# Patient Record
Sex: MALE | Race: ASIAN | Employment: OTHER | URBAN - METROPOLITAN AREA
[De-identification: names, ages, dates, MRNs, and addresses within clinical notes are randomized per-mention and may not be internally consistent; named-entity substitution may affect disease eponyms.]

---

## 2017-04-07 PROBLEM — M85.859 OSTEOPENIA OF THIGH, UNSPECIFIED LATERALITY: Status: ACTIVE | Noted: 2017-04-07

## 2017-04-08 PROBLEM — Z85.850 HISTORY OF THYROID CANCER: Status: ACTIVE | Noted: 2017-04-08

## 2017-11-09 ENCOUNTER — OFFICE VISIT (OUTPATIENT)
Dept: INTERNAL MEDICINE CLINIC | Facility: CLINIC | Age: 77
End: 2017-11-09

## 2017-11-09 VITALS
TEMPERATURE: 98 F | OXYGEN SATURATION: 99 % | RESPIRATION RATE: 16 BRPM | DIASTOLIC BLOOD PRESSURE: 60 MMHG | WEIGHT: 155 LBS | SYSTOLIC BLOOD PRESSURE: 122 MMHG | BODY MASS INDEX: 24 KG/M2 | HEART RATE: 56 BPM

## 2017-11-09 DIAGNOSIS — M17.11 PRIMARY OSTEOARTHRITIS OF RIGHT KNEE: Primary | ICD-10-CM

## 2017-11-09 PROCEDURE — 99213 OFFICE O/P EST LOW 20 MIN: CPT | Performed by: INTERNAL MEDICINE

## 2017-11-09 RX ORDER — ENALAPRIL MALEATE 10 MG/1
10 TABLET ORAL DAILY
Qty: 90 TABLET | Refills: 3 | Status: SHIPPED | OUTPATIENT
Start: 2017-11-09 | End: 2019-02-25

## 2017-11-09 RX ORDER — NICOTINE POLACRILEX 4 MG/1
1 GUM, CHEWING ORAL DAILY
Qty: 90 TABLET | Refills: 3 | Status: SHIPPED | OUTPATIENT
Start: 2017-11-09 | End: 2020-09-01

## 2017-11-09 RX ORDER — HYDROCODONE BITARTRATE AND ACETAMINOPHEN 5; 325 MG/1; MG/1
1 TABLET ORAL EVERY 4 HOURS PRN
Qty: 30 TABLET | Refills: 0 | Status: SHIPPED | OUTPATIENT
Start: 2017-11-09 | End: 2018-02-22 | Stop reason: ALTCHOICE

## 2017-11-09 RX ORDER — CELECOXIB 200 MG/1
200 CAPSULE ORAL DAILY
Qty: 30 CAPSULE | Refills: 11 | Status: SHIPPED | OUTPATIENT
Start: 2017-11-09 | End: 2018-05-22

## 2017-11-09 RX ORDER — ZOSTER VACCINE LIVE 19400 [PFU]/.65ML
INJECTION, POWDER, LYOPHILIZED, FOR SUSPENSION SUBCUTANEOUS
COMMUNITY
Start: 2017-11-07 | End: 2018-05-22

## 2017-11-09 RX ORDER — INFLUENZA A VIRUSA/MICHIGAN/45/2015 X-275 (H1N1) ANTIGEN (FORMALDEHYDE INACTIVATED), INFLUENZA A VIRUS A/HONG KONG/4801/2014 X-263B (H3N2) ANTIGEN (FORMALDEHYDE INACTIVATED), AND INFLUENZA B VIRUS B/BRISBANE/60/2008 ANTIGEN (FORMALDEHYDE INACTIVATED) 60; 60; 60 UG/.5ML; UG/.5ML; UG/.5ML
INJECTION, SUSPENSION INTRAMUSCULAR
COMMUNITY
Start: 2017-11-03 | End: 2018-05-22

## 2017-11-09 NOTE — PROGRESS NOTES
Ankita Belle  1940 is a 69year old male.     Patient presents with:  Pain: Bilateral knee pain      HPI:    Knee:   Presents with c/o Pain right   Swelling no  Locking and catching no    Giving way sensation n0  Injury none  Patient did see the or • Lumbar radiculopathy 9/16/2011   • Osteoarthrosis, unspecified whether generalized or localized, unspecified site    • Prostate hypertrophy 2/8/2016   • Thalassemia minor    • Thyroid cancer ([facility]     Dr. Bella Jaffe at [facility]   • Unspecified essential hypertens

## 2017-11-17 ENCOUNTER — TELEPHONE (OUTPATIENT)
Dept: INTERNAL MEDICINE CLINIC | Facility: CLINIC | Age: 77
End: 2017-11-17

## 2018-01-25 ENCOUNTER — OFFICE VISIT (OUTPATIENT)
Dept: INTERNAL MEDICINE CLINIC | Facility: CLINIC | Age: 78
End: 2018-01-25

## 2018-01-25 VITALS
SYSTOLIC BLOOD PRESSURE: 120 MMHG | RESPIRATION RATE: 14 BRPM | HEIGHT: 68 IN | DIASTOLIC BLOOD PRESSURE: 70 MMHG | OXYGEN SATURATION: 99 % | WEIGHT: 155 LBS | BODY MASS INDEX: 23.49 KG/M2 | TEMPERATURE: 99 F | HEART RATE: 64 BPM

## 2018-01-25 DIAGNOSIS — M85.859 OSTEOPENIA OF THIGH, UNSPECIFIED LATERALITY: ICD-10-CM

## 2018-01-25 DIAGNOSIS — Z00.00 ROUTINE GENERAL MEDICAL EXAMINATION AT A HEALTH CARE FACILITY: Primary | ICD-10-CM

## 2018-01-25 DIAGNOSIS — E89.0 POSTOPERATIVE HYPOTHYROIDISM: Chronic | ICD-10-CM

## 2018-01-25 DIAGNOSIS — R91.8 PULMONARY NODULES: ICD-10-CM

## 2018-01-25 DIAGNOSIS — I10 ESSENTIAL HYPERTENSION, BENIGN: Chronic | ICD-10-CM

## 2018-01-25 PROBLEM — Z85.850 HISTORY OF THYROID CANCER: Chronic | Status: ACTIVE | Noted: 2017-04-08

## 2018-01-25 PROCEDURE — 90732 PPSV23 VACC 2 YRS+ SUBQ/IM: CPT | Performed by: INTERNAL MEDICINE

## 2018-01-25 PROCEDURE — G0009 ADMIN PNEUMOCOCCAL VACCINE: HCPCS | Performed by: INTERNAL MEDICINE

## 2018-01-25 PROCEDURE — 93000 ELECTROCARDIOGRAM COMPLETE: CPT | Performed by: INTERNAL MEDICINE

## 2018-01-25 PROCEDURE — G0439 PPPS, SUBSEQ VISIT: HCPCS | Performed by: INTERNAL MEDICINE

## 2018-01-25 PROCEDURE — 96160 PT-FOCUSED HLTH RISK ASSMT: CPT | Performed by: INTERNAL MEDICINE

## 2018-01-25 NOTE — PROGRESS NOTES
Danny Mendez is a 69year old male who presents for a Medicare Annual Wellness visit.    male     Patient Care Team: Patient Care Team:  Brooklynn Ewing MD as PCP - General (Internal Medicine)  Ricco Wallace MD as Consulting Physician (Internal Medicine) Encounters:  01/25/18 : 155 lb  11/09/17 : 155 lb  04/07/17 : 151 lb  01/31/17 : 151 lb  12/12/16 : 154 lb  10/25/16 : 156 lb    Body mass index is 23.57 kg/m².       Lab Results  Component Value Date   GLU 88 10/27/2015    (H) 08/10/2015   GLU 97 07 Showering: Able without help    Toileting: Able without help    Dressing: Able without help    Eating: Able without help    Driving: Able without help    Preparing your meals: Able without help    Managing money/bills: Able without help    Taking medicatio Screen every 10 years There are no preventive care reminders to display for this patient. Update Health Maintenance if applicable    Flex Sigmoidoscopy Screen every 5 years No results found for this or any previous visit. No flowsheet data found.      Fecal data found. No flowsheet data found. COPD      Spirometry Testing Annually No results found for this or any previous visit. No flowsheet data found.       ALLERGIES:   No Known Allergies    CURRENT MEDICATIONS:     Current Outpatient Prescriptions:  amandeep unspecified laterality 04/07/2017   • Prostate hypertrophy 2/8/2016   • Thalassemia minor    • Thyroid cancer [facility])     Dr. Adalberto Peraza at [facility]   • Unspecified essential hypertension    • Varicocele       Past Surgical History:  2009: CATARACT      Comment: Tono Ma Other      siblings       SOCIAL HISTORY:   Smoking status: Never Smoker                                                              Smokeless tobacco: Never Used                      Alcohol use:  No              Occ: retired : yes      REVIEW OF S urinary incontinence/no history of kidney disease or genital abnormalities. Dysuria none. Nocturia None. Musculoskeletal:   Patient denies arthritis , muscle weakness --HX of LBP occ. Joint pain occ knee pain right -sees Dr. Sera Aquino. Joint stiffness none. GENITOURINARY - MALE:   External genitals: unremarkable-small varicoele. ADONIS: normal .   Penis: unremarkable. Prostate: mildly enlarged. Testicles: unremarkable. EXTREMITIES:   Clubbing: none. Cyanosis: absent . Edema: none.    Pulses: present patient is asked to return in 6 months for f/u .   Diet counseling perfomed    SUGGESTED VACCINATIONS - Influenza, Pneumococcal, Zoster, Tetanus     Immunization History   Administered Date(s) Administered   • Influenza 12/09/2016   • Influenza Vaccine, Hig

## 2018-01-25 NOTE — PATIENT INSTRUCTIONS
Cecil Aguayo's SCREENING SCHEDULE   Tests on this list are recommended by your physician but may not be covered, or covered at this frequency, by your insurer. Please check with your insurance carrier before scheduling to verify coverage.

## 2018-02-01 ENCOUNTER — LAB ENCOUNTER (OUTPATIENT)
Dept: LAB | Age: 78
End: 2018-02-01
Attending: INTERNAL MEDICINE
Payer: MEDICARE

## 2018-02-01 DIAGNOSIS — Z00.00 ROUTINE GENERAL MEDICAL EXAMINATION AT A HEALTH CARE FACILITY: Primary | ICD-10-CM

## 2018-02-01 LAB
ALBUMIN SERPL-MCNC: 3.9 G/DL (ref 3.5–4.8)
ALP LIVER SERPL-CCNC: 94 U/L (ref 45–117)
ALT SERPL-CCNC: 20 U/L (ref 17–63)
AST SERPL-CCNC: 14 U/L (ref 15–41)
BASOPHILS # BLD AUTO: 0.03 X10(3) UL (ref 0–0.1)
BASOPHILS NFR BLD AUTO: 0.6 %
BILIRUB SERPL-MCNC: 0.7 MG/DL (ref 0.1–2)
BILIRUB UR QL STRIP.AUTO: NEGATIVE
BUN BLD-MCNC: 10 MG/DL (ref 8–20)
CALCIUM BLD-MCNC: 8.5 MG/DL (ref 8.3–10.3)
CHLORIDE: 103 MMOL/L (ref 101–111)
CHOLEST SMN-MCNC: 145 MG/DL (ref ?–200)
CLARITY UR REFRACT.AUTO: CLEAR
CO2: 28 MMOL/L (ref 22–32)
CREAT BLD-MCNC: 0.7 MG/DL (ref 0.7–1.3)
EOSINOPHIL # BLD AUTO: 0.12 X10(3) UL (ref 0–0.3)
EOSINOPHIL NFR BLD AUTO: 2.4 %
ERYTHROCYTE [DISTWIDTH] IN BLOOD BY AUTOMATED COUNT: 17.9 % (ref 11.5–16)
EST. AVERAGE GLUCOSE BLD GHB EST-MCNC: 108 MG/DL (ref 68–126)
GLUCOSE BLD-MCNC: 83 MG/DL (ref 70–99)
GLUCOSE UR STRIP.AUTO-MCNC: NEGATIVE MG/DL
HBA1C MFR BLD HPLC: 5.4 % (ref ?–5.7)
HCT VFR BLD AUTO: 36.8 % (ref 37–53)
HDLC SERPL-MCNC: 39 MG/DL (ref 45–?)
HDLC SERPL: 3.72 {RATIO} (ref ?–4.97)
HGB BLD-MCNC: 11.6 G/DL (ref 13–17)
IMMATURE GRANULOCYTE COUNT: 0.01 X10(3) UL (ref 0–1)
IMMATURE GRANULOCYTE RATIO %: 0.2 %
KETONES UR STRIP.AUTO-MCNC: NEGATIVE MG/DL
LDLC SERPL CALC-MCNC: 89 MG/DL (ref ?–130)
LEUKOCYTE ESTERASE UR QL STRIP.AUTO: NEGATIVE
LYMPHOCYTES # BLD AUTO: 1.48 X10(3) UL (ref 0.9–4)
LYMPHOCYTES NFR BLD AUTO: 29.5 %
M PROTEIN MFR SERPL ELPH: 7.6 G/DL (ref 6.1–8.3)
MCH RBC QN AUTO: 19.9 PG (ref 27–33.2)
MCHC RBC AUTO-ENTMCNC: 31.5 G/DL (ref 31–37)
MCV RBC AUTO: 63 FL (ref 80–99)
MONOCYTES # BLD AUTO: 0.44 X10(3) UL (ref 0.1–0.6)
MONOCYTES NFR BLD AUTO: 8.8 %
NEUTROPHIL ABS PRELIM: 2.93 X10 (3) UL (ref 1.3–6.7)
NEUTROPHILS # BLD AUTO: 2.93 X10(3) UL (ref 1.3–6.7)
NEUTROPHILS NFR BLD AUTO: 58.5 %
NITRITE UR QL STRIP.AUTO: NEGATIVE
NONHDLC SERPL-MCNC: 106 MG/DL (ref ?–130)
PH UR STRIP.AUTO: 7 [PH] (ref 4.5–8)
PLATELET # BLD AUTO: 235 10(3)UL (ref 150–450)
POTASSIUM SERPL-SCNC: 4.1 MMOL/L (ref 3.6–5.1)
PROT UR STRIP.AUTO-MCNC: NEGATIVE MG/DL
PSA SERPL-MCNC: 1.57 NG/ML (ref 0.01–4)
RBC # BLD AUTO: 5.84 X10(6)UL (ref 3.8–5.8)
RBC UR QL AUTO: NEGATIVE
RED CELL DISTRIBUTION WIDTH-SD: 36.4 FL (ref 35.1–46.3)
SODIUM SERPL-SCNC: 137 MMOL/L (ref 136–144)
SP GR UR STRIP.AUTO: 1.01 (ref 1–1.03)
THYROXINE (T4): 15.3 UG/DL (ref 4.5–10.9)
TRIGL SERPL-MCNC: 84 MG/DL (ref ?–150)
TSI SER-ACNC: 0.36 MIU/ML (ref 0.35–5.5)
UROBILINOGEN UR STRIP.AUTO-MCNC: <2 MG/DL
VLDLC SERPL CALC-MCNC: 17 MG/DL (ref 5–40)
WBC # BLD AUTO: 5 X10(3) UL (ref 4–13)

## 2018-02-01 PROCEDURE — 36415 COLL VENOUS BLD VENIPUNCTURE: CPT | Performed by: INTERNAL MEDICINE

## 2018-02-01 PROCEDURE — 84436 ASSAY OF TOTAL THYROXINE: CPT | Performed by: INTERNAL MEDICINE

## 2018-02-01 PROCEDURE — 80061 LIPID PANEL: CPT | Performed by: INTERNAL MEDICINE

## 2018-02-01 PROCEDURE — 83036 HEMOGLOBIN GLYCOSYLATED A1C: CPT | Performed by: INTERNAL MEDICINE

## 2018-02-01 PROCEDURE — 81003 URINALYSIS AUTO W/O SCOPE: CPT | Performed by: INTERNAL MEDICINE

## 2018-02-01 PROCEDURE — 80050 GENERAL HEALTH PANEL: CPT | Performed by: INTERNAL MEDICINE

## 2018-02-01 PROCEDURE — G0103 PSA SCREENING: HCPCS | Performed by: INTERNAL MEDICINE

## 2018-02-13 ENCOUNTER — HOSPITAL ENCOUNTER (OUTPATIENT)
Dept: BONE DENSITY | Age: 78
Discharge: HOME | End: 2018-02-13
Attending: INTERNAL MEDICINE
Payer: MEDICARE

## 2018-02-13 DIAGNOSIS — M85.859 OSTEOPENIA OF THIGH, UNSPECIFIED LATERALITY: ICD-10-CM

## 2018-02-13 PROCEDURE — 77080 DXA BONE DENSITY AXIAL: CPT | Performed by: INTERNAL MEDICINE

## 2018-02-22 ENCOUNTER — OFFICE VISIT (OUTPATIENT)
Dept: INTERNAL MEDICINE CLINIC | Facility: CLINIC | Age: 78
End: 2018-02-22

## 2018-02-22 VITALS
SYSTOLIC BLOOD PRESSURE: 138 MMHG | HEART RATE: 56 BPM | RESPIRATION RATE: 16 BRPM | TEMPERATURE: 98 F | DIASTOLIC BLOOD PRESSURE: 80 MMHG | OXYGEN SATURATION: 99 % | BODY MASS INDEX: 24 KG/M2 | WEIGHT: 155 LBS

## 2018-02-22 DIAGNOSIS — L08.9 SKIN INFECTION: Primary | ICD-10-CM

## 2018-02-22 DIAGNOSIS — M85.859 OSTEOPENIA OF THIGH, UNSPECIFIED LATERALITY: ICD-10-CM

## 2018-02-22 PROCEDURE — 99213 OFFICE O/P EST LOW 20 MIN: CPT | Performed by: INTERNAL MEDICINE

## 2018-02-22 NOTE — PROGRESS NOTES
Aki Askew  1940 is a 69year old male. Patient presents with:   Follow - Up: Pimp still bother some, Go over results      HPI:   Lab work    Current Outpatient Prescriptions:  celecoxib (CELEBREX) 200 MG Oral Cap Take 1 capsule (200 mg total) status: Never Smoker                                                              Smokeless tobacco: Never Used                      Alcohol use:  No                 REVIEW OF SYSTEMS:   na        EXAM:   /80   Pulse 56   Temp 98.3 °F (36.8 °C) (Oral)

## 2018-09-17 ENCOUNTER — OFFICE VISIT (OUTPATIENT)
Dept: INTERNAL MEDICINE CLINIC | Facility: CLINIC | Age: 78
End: 2018-09-17
Payer: MEDICARE

## 2018-09-17 ENCOUNTER — APPOINTMENT (OUTPATIENT)
Dept: LAB | Age: 78
End: 2018-09-17
Attending: INTERNAL MEDICINE
Payer: MEDICARE

## 2018-09-17 VITALS
SYSTOLIC BLOOD PRESSURE: 130 MMHG | BODY MASS INDEX: 23 KG/M2 | HEART RATE: 80 BPM | TEMPERATURE: 98 F | RESPIRATION RATE: 16 BRPM | WEIGHT: 152.5 LBS | OXYGEN SATURATION: 99 % | DIASTOLIC BLOOD PRESSURE: 78 MMHG

## 2018-09-17 DIAGNOSIS — Z00.00 ROUTINE GENERAL MEDICAL EXAMINATION AT A HEALTH CARE FACILITY: ICD-10-CM

## 2018-09-17 DIAGNOSIS — R42 DIZZINESS: Primary | ICD-10-CM

## 2018-09-17 LAB
ALBUMIN SERPL-MCNC: 3.9 G/DL (ref 3.5–4.8)
ALBUMIN/GLOB SERPL: 1.1 {RATIO} (ref 1–2)
ALP LIVER SERPL-CCNC: 98 U/L (ref 45–117)
ALT SERPL-CCNC: 19 U/L (ref 17–63)
ANION GAP SERPL CALC-SCNC: 7 MMOL/L (ref 0–18)
AST SERPL-CCNC: 19 U/L (ref 15–41)
BASOPHILS # BLD AUTO: 0.03 X10(3) UL (ref 0–0.1)
BASOPHILS NFR BLD AUTO: 0.5 %
BILIRUB SERPL-MCNC: 0.7 MG/DL (ref 0.1–2)
BUN BLD-MCNC: 12 MG/DL (ref 8–20)
BUN/CREAT SERPL: 15.6 (ref 10–20)
CALCIUM BLD-MCNC: 8.7 MG/DL (ref 8.3–10.3)
CHLORIDE SERPL-SCNC: 99 MMOL/L (ref 101–111)
CO2 SERPL-SCNC: 28 MMOL/L (ref 22–32)
CREAT BLD-MCNC: 0.77 MG/DL (ref 0.7–1.3)
EOSINOPHIL # BLD AUTO: 0.13 X10(3) UL (ref 0–0.3)
EOSINOPHIL NFR BLD AUTO: 2.2 %
ERYTHROCYTE [DISTWIDTH] IN BLOOD BY AUTOMATED COUNT: 17.9 % (ref 11.5–16)
GLOBULIN PLAS-MCNC: 3.4 G/DL (ref 2.5–4)
GLUCOSE BLD-MCNC: 80 MG/DL (ref 70–99)
HAV IGM SER QL: 2.2 MG/DL (ref 1.8–2.5)
HCT VFR BLD AUTO: 34.7 % (ref 37–53)
HGB BLD-MCNC: 11.1 G/DL (ref 13–17)
IMMATURE GRANULOCYTE COUNT: 0.01 X10(3) UL (ref 0–1)
IMMATURE GRANULOCYTE RATIO %: 0.2 %
LYMPHOCYTES # BLD AUTO: 1.32 X10(3) UL (ref 0.9–4)
LYMPHOCYTES NFR BLD AUTO: 22.4 %
M PROTEIN MFR SERPL ELPH: 7.3 G/DL (ref 6.1–8.3)
MCH RBC QN AUTO: 20.1 PG (ref 27–33.2)
MCHC RBC AUTO-ENTMCNC: 32 G/DL (ref 31–37)
MCV RBC AUTO: 63 FL (ref 80–99)
MONOCYTES # BLD AUTO: 0.61 X10(3) UL (ref 0.1–1)
MONOCYTES NFR BLD AUTO: 10.4 %
NEUTROPHIL ABS PRELIM: 3.79 X10 (3) UL (ref 1.3–6.7)
NEUTROPHILS # BLD AUTO: 3.79 X10(3) UL (ref 1.3–6.7)
NEUTROPHILS NFR BLD AUTO: 64.3 %
OSMOLALITY SERPL CALC.SUM OF ELEC: 277 MOSM/KG (ref 275–295)
PLATELET # BLD AUTO: 228 10(3)UL (ref 150–450)
POTASSIUM SERPL-SCNC: 4.7 MMOL/L (ref 3.6–5.1)
RBC # BLD AUTO: 5.51 X10(6)UL (ref 3.8–5.8)
RED CELL DISTRIBUTION WIDTH-SD: 36.8 FL (ref 35.1–46.3)
SODIUM SERPL-SCNC: 134 MMOL/L (ref 136–144)
TSI SER-ACNC: 0.35 MIU/ML (ref 0.35–5.5)
VIT D+METAB SERPL-MCNC: 24.5 NG/ML (ref 30–100)
WBC # BLD AUTO: 5.9 X10(3) UL (ref 4–13)

## 2018-09-17 PROCEDURE — 80053 COMPREHEN METABOLIC PANEL: CPT | Performed by: INTERNAL MEDICINE

## 2018-09-17 PROCEDURE — 99214 OFFICE O/P EST MOD 30 MIN: CPT | Performed by: INTERNAL MEDICINE

## 2018-09-17 PROCEDURE — 83735 ASSAY OF MAGNESIUM: CPT | Performed by: INTERNAL MEDICINE

## 2018-09-17 PROCEDURE — 82306 VITAMIN D 25 HYDROXY: CPT

## 2018-09-17 PROCEDURE — 85025 COMPLETE CBC W/AUTO DIFF WBC: CPT | Performed by: INTERNAL MEDICINE

## 2018-09-17 PROCEDURE — 84443 ASSAY THYROID STIM HORMONE: CPT | Performed by: INTERNAL MEDICINE

## 2018-09-17 PROCEDURE — 93000 ELECTROCARDIOGRAM COMPLETE: CPT | Performed by: INTERNAL MEDICINE

## 2018-09-17 PROCEDURE — 36415 COLL VENOUS BLD VENIPUNCTURE: CPT | Performed by: INTERNAL MEDICINE

## 2018-09-17 NOTE — PROGRESS NOTES
Rob Cox  1940 is a 69year old male. Patient presents with:  Dizziness      HPI:   In the last few months patient has felt that times that he is going to pass out  Both have been present at the time of stressful situations.   Recently larissa Night sweats no. Weakness no. Weight change no. HEENT/Neck:   Ears no earaches, no infections, normal hearing. Nose and Sinuses no stuffiness, no discharge, no sinus pain. Mouth and Pharynx no sore throats. Endocrine:   Diabetes none.    Respiratory: pupils bilaterally equal reacting to light. bladder and bowel normal.   Cerebellar: WNL, finger-to-nose - normal, heel-to-shin - normal, Romberg - normal.   Coordination: normal.   Cranial Nerves: CN's II-XII grossly intact. Romberg: negative.    [facility]

## 2019-01-16 ENCOUNTER — HOSPITAL ENCOUNTER (OUTPATIENT)
Dept: ULTRASOUND IMAGING | Facility: HOSPITAL | Age: 79
Discharge: HOME | End: 2019-01-16
Attending: INTERNAL MEDICINE
Payer: MEDICARE

## 2019-01-16 DIAGNOSIS — Z85.850 HISTORY OF THYROID CANCER: Chronic | ICD-10-CM

## 2019-01-16 DIAGNOSIS — E89.0 POSTOPERATIVE HYPOTHYROIDISM: Chronic | ICD-10-CM

## 2019-01-16 PROCEDURE — 76536 US EXAM OF HEAD AND NECK: CPT | Performed by: INTERNAL MEDICINE

## 2019-01-21 NOTE — PROGRESS NOTES
Called pt, gave results per md. Pt is concerned because there was no comparison to prev test.   Last head/neck was in 2012 when pt had dx. He wants the 2 US  Compared.  Explained that the test was to check for residual tissue and comparisons are done to mon

## 2019-01-22 PROCEDURE — 86800 THYROGLOBULIN ANTIBODY: CPT | Performed by: INTERNAL MEDICINE

## 2019-01-22 PROCEDURE — 84432 ASSAY OF THYROGLOBULIN: CPT | Performed by: INTERNAL MEDICINE

## 2019-01-22 NOTE — PROGRESS NOTES
[phone] (home)   Patient informed of Dr. Good Benitez result note. Patient verbalized understanding and agrees with plan.

## 2019-02-25 ENCOUNTER — OFFICE VISIT (OUTPATIENT)
Dept: INTERNAL MEDICINE CLINIC | Facility: CLINIC | Age: 79
End: 2019-02-25
Payer: MEDICARE

## 2019-02-25 VITALS
BODY MASS INDEX: 24.4 KG/M2 | TEMPERATURE: 98 F | DIASTOLIC BLOOD PRESSURE: 78 MMHG | SYSTOLIC BLOOD PRESSURE: 130 MMHG | HEIGHT: 67 IN | OXYGEN SATURATION: 99 % | WEIGHT: 155.5 LBS | RESPIRATION RATE: 16 BRPM | HEART RATE: 93 BPM

## 2019-02-25 DIAGNOSIS — M47.816 SPONDYLOSIS OF LUMBAR REGION WITHOUT MYELOPATHY OR RADICULOPATHY: Primary | ICD-10-CM

## 2019-02-25 PROCEDURE — 99214 OFFICE O/P EST MOD 30 MIN: CPT | Performed by: INTERNAL MEDICINE

## 2019-02-25 RX ORDER — OMEPRAZOLE 40 MG/1
1 CAPSULE, DELAYED RELEASE ORAL DAILY
COMMUNITY
Start: 2018-11-30 | End: 2019-02-25

## 2019-02-25 RX ORDER — NAPROXEN 500 MG/1
500 TABLET ORAL 2 TIMES DAILY WITH MEALS
Qty: 60 TABLET | Refills: 0 | Status: SHIPPED | OUTPATIENT
Start: 2019-02-25 | End: 2019-03-27

## 2019-02-25 RX ORDER — ENALAPRIL MALEATE AND HYDROCHLOROTHIAZIDE 10; 25 MG/1; MG/1
1 TABLET ORAL DAILY
COMMUNITY
Start: 2018-11-21 | End: 2020-02-10 | Stop reason: ALTCHOICE

## 2019-02-25 NOTE — PROGRESS NOTES
Ke Saxena  1940 is a 69year old male. Patient presents with:  Low Back Pain      HPI:   C/o of low back pain for few days   Had sudden flareup.   Patient sees doing his back exercises    Current Outpatient Medications:  Enalapril-hydroCHLORO trauma none. Tingling/numbness none. Previous injury none. Previous surgery none. Previous therapy none. Bowel and bladder incontinence none.    Pain scale 3/10  Increased by prolonged sitting        REVIEW OF SYSTEMS:     General/Constitutional: 25        ASSESSMENT AND PLAN:   Ibeth Beard was seen today for low back pain. Diagnoses and all orders for this visit:    Spondylosis of lumbar region without myelopathy or radiculopathy  -     naproxen 500 MG Oral Tab;  Take 1 tablet (500 mg total) by mouth

## 2019-04-05 ENCOUNTER — TELEPHONE (OUTPATIENT)
Dept: INTERNAL MEDICINE CLINIC | Facility: CLINIC | Age: 79
End: 2019-04-05

## 2019-04-05 NOTE — TELEPHONE ENCOUNTER
Faxed back signed Initial evaluation/plan of care to [facility] @ [phone] for patients spondylosis. Fax confirmation received - copies made for scan and tickler file.

## 2019-07-10 ENCOUNTER — MA CHART PREP (OUTPATIENT)
Dept: FAMILY MEDICINE CLINIC | Facility: CLINIC | Age: 79
End: 2019-07-10

## 2019-07-10 PROBLEM — I70.0 ATHEROSCLEROSIS OF AORTA: Status: ACTIVE | Noted: 2019-07-10

## 2019-07-10 PROBLEM — I70.0 ATHEROSCLEROSIS OF AORTA (HCC): Status: ACTIVE | Noted: 2019-07-10

## 2019-07-16 ENCOUNTER — OFFICE VISIT (OUTPATIENT)
Dept: INTERNAL MEDICINE CLINIC | Facility: CLINIC | Age: 79
End: 2019-07-16
Payer: MEDICARE

## 2019-07-16 VITALS
BODY MASS INDEX: 23.46 KG/M2 | TEMPERATURE: 99 F | HEIGHT: 67 IN | RESPIRATION RATE: 16 BRPM | WEIGHT: 149.5 LBS | OXYGEN SATURATION: 100 % | HEART RATE: 66 BPM | SYSTOLIC BLOOD PRESSURE: 136 MMHG | DIASTOLIC BLOOD PRESSURE: 74 MMHG

## 2019-07-16 DIAGNOSIS — Z00.00 ROUTINE GENERAL MEDICAL EXAMINATION AT A HEALTH CARE FACILITY: Primary | ICD-10-CM

## 2019-07-16 DIAGNOSIS — M85.89 OSTEOPENIA OF MULTIPLE SITES: ICD-10-CM

## 2019-07-16 DIAGNOSIS — I10 ESSENTIAL HYPERTENSION, BENIGN: Chronic | ICD-10-CM

## 2019-07-16 DIAGNOSIS — E03.9 HYPOTHYROIDISM, UNSPECIFIED TYPE: Chronic | ICD-10-CM

## 2019-07-16 DIAGNOSIS — Z85.850 HISTORY OF THYROID CANCER: Chronic | ICD-10-CM

## 2019-07-16 DIAGNOSIS — R91.8 PULMONARY NODULES: Chronic | ICD-10-CM

## 2019-07-16 PROBLEM — M85.859 OSTEOPENIA OF THIGH: Status: RESOLVED | Noted: 2017-04-07 | Resolved: 2019-07-16

## 2019-07-16 PROBLEM — I70.0 ATHEROSCLEROSIS OF AORTA (HCC): Status: RESOLVED | Noted: 2019-07-10 | Resolved: 2019-07-16

## 2019-07-16 PROBLEM — I70.0 ATHEROSCLEROSIS OF AORTA: Status: RESOLVED | Noted: 2019-07-10 | Resolved: 2019-07-16

## 2019-07-16 PROCEDURE — 96160 PT-FOCUSED HLTH RISK ASSMT: CPT | Performed by: INTERNAL MEDICINE

## 2019-07-16 PROCEDURE — 99397 PER PM REEVAL EST PAT 65+ YR: CPT | Performed by: INTERNAL MEDICINE

## 2019-07-16 PROCEDURE — G0439 PPPS, SUBSEQ VISIT: HCPCS | Performed by: INTERNAL MEDICINE

## 2019-07-16 RX ORDER — TAMSULOSIN HYDROCHLORIDE 0.4 MG/1
0.4 CAPSULE ORAL DAILY
Qty: 90 CAPSULE | Refills: 3 | Status: SHIPPED | OUTPATIENT
Start: 2019-07-16 | End: 2020-02-10

## 2019-07-16 NOTE — PROGRESS NOTES
REASON FOR VISIT:    Baudilio Nascimento is a 67year old male who presents for a Medicare Annual Wellness visit.     Male      Patient Care Team: Patient Care Team:  Deisy Haider MD as PCP - General (Internal Medicine)  Sangeetha Colin MD as Consulting Physicia 39(L) 45 40 46   LDL <130 mg/dL 89 87 90 85     BUN and Cr Latest Ref Rng & Units 9/17/2018 2/1/2018 6/15/2016 10/27/2015 8/10/2015 7/16/2015 10/14/2014   BUN 8 - 20 mg/dL 12 10 14 14 14 14 11   Creatinine 0.70 - 1.30 mg/dL 0.77 0.70 0.71(L) 0.79 0.79 0.68 patient. Flex Sigmoidoscopy Screen every 5 years No results found for this or any previous visit.     Fecal Occult Blood Annually No results found for: FOB, OCCULTSTOOL   Glaucoma Screening     Ophthalmology Visit Annually advised   Immunizations     Zos • Other (Other [Other]) Mother         unspecified CVA   • Hypertension Mother    • Hypertension Other         unspecified sibling    • Heart Disease Other         siblings      Immunization History      Immunization History  Administered            Date nausea, heartburn denies any wt loss or gain no change in appetite noted no change in bowel movement noted. Dysphagia none.  Sees Dr. Hiren Neil for his GI issues   Hematology:   Patient denies abnormal bleeding, easy bruising. Enlarged lymph nodes none.    Men O hepatosplenomegaly ([facility]). Tenderness: absent . GENITOURINARY - MALE:   External genitals: unremarkable-small varicoele. ADONIS: normal .   Penis: unremarkable. Prostate: mildly enlarged. Testicles: unremarkable. EXTREMITIES:   Clubbing: none.    Cy return in 6 weeks  for f/u   Diet counseling perfomed    SUGGESTED VACCINATIONS - Influenza, Pneumococcal, Zoster, Tetanus   Influenza: There are no preventive care reminders to display for this patient.   Pneumonia: There are no preventive care reminders t

## 2019-07-31 LAB
ABSOLUTE BASOPHILS: 30 CELLS/UL (ref 0–200)
ABSOLUTE EOSINOPHILS: 148 CELLS/UL (ref 15–500)
ABSOLUTE LYMPHOCYTES: 1204 CELLS/UL (ref 850–3900)
ABSOLUTE MONOCYTES: 578 CELLS/UL (ref 200–950)
ABSOLUTE NEUTROPHILS: 3941 CELLS/UL (ref 1500–7800)
ALBUMIN/GLOBULIN RATIO: 1.6 (CALC) (ref 1–2.5)
ALBUMIN: 4.1 G/DL (ref 3.6–5.1)
ALKALINE PHOSPHATASE: 72 U/L (ref 40–115)
ALT: 10 U/L (ref 9–46)
APPEARANCE: CLEAR
AST: 18 U/L (ref 10–35)
BASOPHILS: 0.5 %
BILIRUBIN, TOTAL: 1.1 MG/DL (ref 0.2–1.2)
BILIRUBIN: NEGATIVE
BUN: 12 MG/DL (ref 7–25)
CALCIUM: 8.9 MG/DL (ref 8.6–10.3)
CARBON DIOXIDE: 28 MMOL/L (ref 20–32)
CHLORIDE: 97 MMOL/L (ref 98–110)
CHOL/HDLC RATIO: 3.1 (CALC)
CHOLESTEROL, TOTAL: 145 MG/DL
CREATININE: 0.81 MG/DL (ref 0.7–1.18)
EGFR IF AFRICN AM: 99 ML/MIN/1.73M2
EGFR IF NONAFRICN AM: 85 ML/MIN/1.73M2
EOSINOPHILS: 2.5 %
GLOBULIN: 2.5 G/DL (CALC) (ref 1.9–3.7)
GLUCOSE: 94 MG/DL (ref 65–99)
GLUCOSE: NEGATIVE
HDL CHOLESTEROL: 47 MG/DL
HEMATOCRIT: 35.6 % (ref 38.5–50)
HEMOGLOBIN A1C: 5.1 % OF TOTAL HGB
HEMOGLOBIN: 10.8 G/DL (ref 13.2–17.1)
KETONES: NEGATIVE
LDL-CHOLESTEROL: 84 MG/DL (CALC)
LYMPHOCYTES: 20.4 %
MCH: 19.4 PG (ref 27–33)
MCHC: 30.3 G/DL (ref 32–36)
MCV: 64 FL (ref 80–100)
MONOCYTES: 9.8 %
MPV: 10.5 FL (ref 7.5–12.5)
NEUTROPHILS: 66.8 %
NITRITE: NEGATIVE
NON-HDL CHOLESTEROL: 98 MG/DL (CALC)
OCCULT BLOOD: NEGATIVE
PH: 7.5 (ref 5–8)
PLATELET COUNT: 244 THOUSAND/UL (ref 140–400)
POTASSIUM: 4.4 MMOL/L (ref 3.5–5.3)
PROTEIN, TOTAL: 6.6 G/DL (ref 6.1–8.1)
RDW: 18.5 % (ref 11–15)
RED BLOOD CELL COUNT: 5.56 MILLION/UL (ref 4.2–5.8)
SODIUM: 133 MMOL/L (ref 135–146)
SPECIFIC GRAVITY: 1.02 (ref 1–1.03)
T4 (THYROXINE), TOTAL: 13.2 MCG/DL (ref 4.9–10.5)
TRIGLYCERIDES: 62 MG/DL
TSH: 0.43 MIU/L (ref 0.4–4.5)
VITAMIN D, 25-OH, TOTAL: 33 NG/ML (ref 30–100)
WHITE BLOOD CELL COUNT: 5.9 THOUSAND/UL (ref 3.8–10.8)

## 2019-08-05 ENCOUNTER — TELEPHONE (OUTPATIENT)
Dept: INTERNAL MEDICINE CLINIC | Facility: CLINIC | Age: 79
End: 2019-08-05

## 2019-08-05 DIAGNOSIS — D64.9 LOW HEMOGLOBIN: Primary | ICD-10-CM

## 2019-08-05 NOTE — TELEPHONE ENCOUNTER
Notes recorded by Rolo Leonard MD on 8/1/2019 at 4:33 PM CDT  Reviewed results   Hemoglobin is slightly decreased this could be consistent with his thalassemia however I would still like to repeat a CBC iron studies B12 folate ferritin and BMP in about

## 2019-09-20 LAB
% SATURATION: 36 % (CALC) (ref 20–48)
ABSOLUTE BASOPHILS: 19 CELLS/UL (ref 0–200)
ABSOLUTE EOSINOPHILS: 82 CELLS/UL (ref 15–500)
ABSOLUTE LYMPHOCYTES: 1214 CELLS/UL (ref 850–3900)
ABSOLUTE MONOCYTES: 480 CELLS/UL (ref 200–950)
ABSOLUTE NEUTROPHILS: 3005 CELLS/UL (ref 1500–7800)
BASOPHILS: 0.4 %
BUN/CREATININE RATIO: 16 (CALC) (ref 6–22)
BUN: 11 MG/DL (ref 7–25)
CALCIUM: 9.2 MG/DL (ref 8.6–10.3)
CARBON DIOXIDE: 28 MMOL/L (ref 20–32)
CHLORIDE: 95 MMOL/L (ref 98–110)
CREATININE: 0.67 MG/DL (ref 0.7–1.18)
EGFR IF AFRICN AM: 107 ML/MIN/1.73M2
EGFR IF NONAFRICN AM: 92 ML/MIN/1.73M2
EOSINOPHILS: 1.7 %
FERRITIN: 56 NG/ML (ref 24–380)
FOLATE, SERUM: >24 NG/ML
GLUCOSE: 122 MG/DL (ref 65–139)
HEMATOCRIT: 34.4 % (ref 38.5–50)
HEMOGLOBIN: 10.8 G/DL (ref 13.2–17.1)
IRON BINDING CAPACITY: 288 MCG/DL (CALC) (ref 250–425)
IRON, TOTAL: 104 MCG/DL (ref 50–180)
LYMPHOCYTES: 25.3 %
MCH: 19.7 PG (ref 27–33)
MCHC: 31.4 G/DL (ref 32–36)
MCV: 62.8 FL (ref 80–100)
MONOCYTES: 10 %
MPV: 10.7 FL (ref 7.5–12.5)
NEUTROPHILS: 62.6 %
PLATELET COUNT: 219 THOUSAND/UL (ref 140–400)
POTASSIUM: 3.8 MMOL/L (ref 3.5–5.3)
RDW: 19.4 % (ref 11–15)
RED BLOOD CELL COUNT: 5.48 MILLION/UL (ref 4.2–5.8)
SODIUM: 131 MMOL/L (ref 135–146)
VITAMIN B12: 483 PG/ML (ref 200–1100)
WHITE BLOOD CELL COUNT: 4.8 THOUSAND/UL (ref 3.8–10.8)

## 2019-09-24 ENCOUNTER — OFFICE VISIT (OUTPATIENT)
Dept: INTERNAL MEDICINE CLINIC | Facility: CLINIC | Age: 79
End: 2019-09-24
Payer: MEDICARE

## 2019-09-24 VITALS
TEMPERATURE: 98 F | DIASTOLIC BLOOD PRESSURE: 70 MMHG | HEIGHT: 67 IN | WEIGHT: 150.25 LBS | BODY MASS INDEX: 23.58 KG/M2 | HEART RATE: 68 BPM | SYSTOLIC BLOOD PRESSURE: 130 MMHG | OXYGEN SATURATION: 98 % | RESPIRATION RATE: 16 BRPM

## 2019-09-24 DIAGNOSIS — D56.3 THALASSEMIA MINOR: ICD-10-CM

## 2019-09-24 DIAGNOSIS — E87.1 HYPONATREMIA: Primary | ICD-10-CM

## 2019-09-24 PROBLEM — M85.89 OSTEOPENIA OF MULTIPLE SITES: Chronic | Status: ACTIVE | Noted: 2019-07-16

## 2019-09-24 PROCEDURE — 99213 OFFICE O/P EST LOW 20 MIN: CPT | Performed by: INTERNAL MEDICINE

## 2019-09-24 NOTE — PATIENT INSTRUCTIONS
And informed about his low sodium. Was given option 1 to repeat in 4 weeks because it is borderline.   Also informed him that he should decrease his fluid intake and/or gave the option of discontinuing the hydrochlorothiazide  She wants repeat BMP in 4 wee

## 2019-09-24 NOTE — PROGRESS NOTES
Mehreen Arnett  1940 is a 67year old male. No chief complaint on file. HPI:   Here for lab results. Does have multiple questions about each and every lab.     Current Outpatient Medications:  tamsulosin HCl 0.4 MG Oral Cap Take 1 capsule (0 Social History:  Social History    Tobacco Use      Smoking status: Never Smoker      Smokeless tobacco: Never Used    Alcohol use: No    Drug use: No       REVIEW OF SYSTEMS:   na      EXAM:   /70 (BP Location: Right arm, Patient Position: Sitting

## 2020-02-10 ENCOUNTER — OFFICE VISIT (OUTPATIENT)
Dept: INTERNAL MEDICINE CLINIC | Facility: CLINIC | Age: 80
End: 2020-02-10
Payer: MEDICARE

## 2020-02-10 VITALS
HEIGHT: 67 IN | DIASTOLIC BLOOD PRESSURE: 66 MMHG | TEMPERATURE: 98 F | OXYGEN SATURATION: 100 % | WEIGHT: 154.5 LBS | HEART RATE: 71 BPM | SYSTOLIC BLOOD PRESSURE: 128 MMHG | BODY MASS INDEX: 24.25 KG/M2 | RESPIRATION RATE: 16 BRPM

## 2020-02-10 DIAGNOSIS — L60.0 INGROWING TOENAIL OF LEFT FOOT: ICD-10-CM

## 2020-02-10 DIAGNOSIS — L60.3 NAIL DYSTROPHY: Primary | ICD-10-CM

## 2020-02-10 PROCEDURE — 99213 OFFICE O/P EST LOW 20 MIN: CPT | Performed by: INTERNAL MEDICINE

## 2020-02-10 RX ORDER — HYDROCHLOROTHIAZIDE 25 MG/1
1 TABLET ORAL DAILY
COMMUNITY
Start: 2019-09-13 | End: 2020-09-01

## 2020-02-10 RX ORDER — ENALAPRIL MALEATE 10 MG/1
1 TABLET ORAL DAILY
COMMUNITY
Start: 2019-12-22

## 2020-02-10 RX ORDER — TAMSULOSIN HYDROCHLORIDE 0.4 MG/1
0.4 CAPSULE ORAL DAILY
Qty: 90 CAPSULE | Refills: 3 | Status: SHIPPED | OUTPATIENT
Start: 2020-02-10 | End: 2020-05-10

## 2020-02-10 NOTE — PATIENT INSTRUCTIONS
questions answered within his timeframe limits.   Patient advised that he needs to set up another appointment for evaluation of dizziness

## 2020-02-10 NOTE — PROGRESS NOTES
Josefina Stephens  1940 is a 67year old male. Patient presents with: Foot Pain      HPI:   Complains of painful left great toe.   Current Outpatient Medications   Medication Sig Dispense Refill   • Enalapril Maleate 10 MG Oral Tab Take 1 tablet by Smoker      Smokeless tobacco: Never Used    Alcohol use: No    Drug use: No       REVIEW OF SYSTEMS:   na        EXAM:   /66   Pulse 71   Temp 98 °F (36.7 °C) (Oral)   Resp 16   Ht 67\"   Wt 154 lb 8 oz (70.1 kg)   SpO2 100%   BMI 24.20 kg/m²     Pa

## 2020-07-09 ENCOUNTER — TELEPHONE (OUTPATIENT)
Dept: INTERNAL MEDICINE CLINIC | Facility: CLINIC | Age: 80
End: 2020-07-09

## 2020-07-09 DIAGNOSIS — Z12.5 ENCOUNTER FOR SCREENING FOR MALIGNANT NEOPLASM OF PROSTATE: ICD-10-CM

## 2020-07-09 DIAGNOSIS — M85.89 OSTEOPENIA OF MULTIPLE SITES: ICD-10-CM

## 2020-07-09 DIAGNOSIS — Z00.00 BLOOD TESTS FOR ROUTINE GENERAL PHYSICAL EXAMINATION: Primary | ICD-10-CM

## 2020-07-09 DIAGNOSIS — D56.3 THALASSEMIA MINOR: ICD-10-CM

## 2020-07-09 DIAGNOSIS — I10 ESSENTIAL HYPERTENSION, BENIGN: ICD-10-CM

## 2020-07-09 DIAGNOSIS — E03.9 HYPOTHYROIDISM, UNSPECIFIED TYPE: ICD-10-CM

## 2020-07-10 NOTE — TELEPHONE ENCOUNTER
Pts wife Genesis Tijerina notified that fasting lab orders have been entered through [address] [place] [address].

## 2020-08-13 LAB
ABSOLUTE BASOPHILS: 27 CELLS/UL (ref 0–200)
ABSOLUTE EOSINOPHILS: 127 CELLS/UL (ref 15–500)
ABSOLUTE LYMPHOCYTES: 2171 CELLS/UL (ref 850–3900)
ABSOLUTE MONOCYTES: 623 CELLS/UL (ref 200–950)
ABSOLUTE NEUTROPHILS: 3752 CELLS/UL (ref 1500–7800)
ALBUMIN/GLOBULIN RATIO: 1.8 (CALC) (ref 1–2.5)
ALBUMIN: 4.4 G/DL (ref 3.6–5.1)
ALKALINE PHOSPHATASE: 89 U/L (ref 35–144)
ALT: 13 U/L (ref 9–46)
APPEARANCE: CLEAR
AST: 23 U/L (ref 10–35)
BASOPHILS: 0.4 %
BILIRUBIN, TOTAL: 1.1 MG/DL (ref 0.2–1.2)
BILIRUBIN: NEGATIVE
BUN: 12 MG/DL (ref 7–25)
CALCIUM: 9.1 MG/DL (ref 8.6–10.3)
CARBON DIOXIDE: 26 MMOL/L (ref 20–32)
CHLORIDE: 97 MMOL/L (ref 98–110)
CHOL/HDLC RATIO: 2.7 (CALC)
CHOLESTEROL, TOTAL: 138 MG/DL
CREATININE: 0.81 MG/DL (ref 0.7–1.18)
EGFR IF AFRICN AM: 98 ML/MIN/1.73M2
EGFR IF NONAFRICN AM: 85 ML/MIN/1.73M2
EOSINOPHILS: 1.9 %
GLOBULIN: 2.4 G/DL (CALC) (ref 1.9–3.7)
GLUCOSE: 91 MG/DL (ref 65–99)
GLUCOSE: NEGATIVE
HDL CHOLESTEROL: 51 MG/DL
HEMATOCRIT: 37.2 % (ref 38.5–50)
HEMOGLOBIN: 11.4 G/DL (ref 13.2–17.1)
KETONES: NEGATIVE
LDL-CHOLESTEROL: 72 MG/DL (CALC)
LEUKOCYTE ESTERASE: NEGATIVE
LYMPHOCYTES: 32.4 %
MCH: 20 PG (ref 27–33)
MCHC: 30.6 G/DL (ref 32–36)
MCV: 65.1 FL (ref 80–100)
MONOCYTES: 9.3 %
MPV: 10.5 FL (ref 7.5–12.5)
NEUTROPHILS: 56 %
NITRITE: NEGATIVE
NON-HDL CHOLESTEROL: 87 MG/DL (CALC)
OCCULT BLOOD: NEGATIVE
PH: 7.5 (ref 5–8)
PLATELET COUNT: 255 THOUSAND/UL (ref 140–400)
POTASSIUM: 4.6 MMOL/L (ref 3.5–5.3)
PROTEIN, TOTAL: 6.8 G/DL (ref 6.1–8.1)
PSA, TOTAL: 1.2 NG/ML
RDW: 19.7 % (ref 11–15)
RED BLOOD CELL COUNT: 5.71 MILLION/UL (ref 4.2–5.8)
SODIUM: 129 MMOL/L (ref 135–146)
SPECIFIC GRAVITY: 1.01 (ref 1–1.03)
T4 (THYROXINE), TOTAL: 13 MCG/DL (ref 4.9–10.5)
TRIGLYCERIDES: 71 MG/DL
TSH: 1.18 MIU/L (ref 0.4–4.5)
VITAMIN D, 25-OH, TOTAL: 37 NG/ML (ref 30–100)
WHITE BLOOD CELL COUNT: 6.7 THOUSAND/UL (ref 3.8–10.8)

## 2020-09-01 ENCOUNTER — OFFICE VISIT (OUTPATIENT)
Dept: INTERNAL MEDICINE CLINIC | Facility: CLINIC | Age: 80
End: 2020-09-01
Payer: MEDICARE

## 2020-09-01 VITALS
HEIGHT: 67 IN | SYSTOLIC BLOOD PRESSURE: 130 MMHG | OXYGEN SATURATION: 99 % | HEART RATE: 68 BPM | WEIGHT: 152 LBS | RESPIRATION RATE: 16 BRPM | DIASTOLIC BLOOD PRESSURE: 80 MMHG | BODY MASS INDEX: 23.86 KG/M2 | TEMPERATURE: 98 F

## 2020-09-01 DIAGNOSIS — M85.89 OSTEOPENIA OF MULTIPLE SITES: Chronic | ICD-10-CM

## 2020-09-01 DIAGNOSIS — Z00.00 ROUTINE GENERAL MEDICAL EXAMINATION AT A HEALTH CARE FACILITY: Primary | ICD-10-CM

## 2020-09-01 DIAGNOSIS — I10 ESSENTIAL HYPERTENSION, BENIGN: Chronic | ICD-10-CM

## 2020-09-01 DIAGNOSIS — L72.9 SKIN CYST: ICD-10-CM

## 2020-09-01 DIAGNOSIS — R91.8 PULMONARY NODULES: Chronic | ICD-10-CM

## 2020-09-01 DIAGNOSIS — E89.0 POSTOPERATIVE HYPOTHYROIDISM: Chronic | ICD-10-CM

## 2020-09-01 DIAGNOSIS — D56.3 THALASSEMIA MINOR: ICD-10-CM

## 2020-09-01 DIAGNOSIS — Z85.850 HISTORY OF THYROID CANCER: Chronic | ICD-10-CM

## 2020-09-01 DIAGNOSIS — C73 MALIGNANT NEOPLASM OF THYROID GLAND (HCC): ICD-10-CM

## 2020-09-01 PROCEDURE — 93000 ELECTROCARDIOGRAM COMPLETE: CPT | Performed by: INTERNAL MEDICINE

## 2020-09-01 PROCEDURE — 3075F SYST BP GE 130 - 139MM HG: CPT | Performed by: INTERNAL MEDICINE

## 2020-09-01 PROCEDURE — 3079F DIAST BP 80-89 MM HG: CPT | Performed by: INTERNAL MEDICINE

## 2020-09-01 PROCEDURE — 3008F BODY MASS INDEX DOCD: CPT | Performed by: INTERNAL MEDICINE

## 2020-09-01 PROCEDURE — 96160 PT-FOCUSED HLTH RISK ASSMT: CPT | Performed by: INTERNAL MEDICINE

## 2020-09-01 PROCEDURE — 99397 PER PM REEVAL EST PAT 65+ YR: CPT | Performed by: INTERNAL MEDICINE

## 2020-09-01 PROCEDURE — G0439 PPPS, SUBSEQ VISIT: HCPCS | Performed by: INTERNAL MEDICINE

## 2020-09-01 RX ORDER — NICOTINE POLACRILEX 4 MG/1
1 GUM, CHEWING ORAL DAILY
Qty: 90 TABLET | Refills: 3 | Status: SHIPPED | OUTPATIENT
Start: 2020-09-01 | End: 2021-11-15

## 2020-09-01 RX ORDER — TAMSULOSIN HYDROCHLORIDE 0.4 MG/1
CAPSULE ORAL
COMMUNITY
Start: 2020-06-15 | End: 2020-09-01

## 2020-09-01 RX ORDER — TAMSULOSIN HYDROCHLORIDE 0.4 MG/1
0.4 CAPSULE ORAL DAILY
Qty: 30 CAPSULE | Refills: 0 | COMMUNITY
Start: 2020-09-01 | End: 2021-03-09

## 2020-09-01 RX ORDER — HYDROCHLOROTHIAZIDE 25 MG/1
TABLET ORAL
Refills: 0 | COMMUNITY
Start: 2020-09-01

## 2020-09-01 NOTE — PROGRESS NOTES
REASON FOR VISIT:    Jacquie Patterson is a 79year old male who presents for a Medicare Annual Wellness visit.    male     Patient Care Team: Patient Care Team:  Mely Potter MD as PCP - General (Internal Medicine)  Miladis Zayas MD as Consulting Physician 10/27/2015 10/14/2014   Total Cholesterol <200 mg/dL 138 145 145 147 144 145   Triglycerides <150 mg/dL 71 62 84 74 69 72   HDL > OR = 40 mg/dL 51 47 39(L) 45 40 46   LDL mg/dL (calc) 72 84 89 87 90 85     BUN and Cr Latest Ref Rng & Units 8/12/2020 11/8/2 Able without help  Preparing your meals: Able without help  Managing money/bills: Able without help  Taking medications as prescribed: Able without help  Are you able to afford your medications?: Yes  Hearing Problems?: Yes     Functional Status     Hearin Immunizations     Zoster (Not covered by Medicare Part B) No orders found for this or any previous visit.      SPECIFIC DISEASE MONITORING Internal Lab or Procedure     Annual Monitoring of Persistent Medications  (ACE/ARB, Digoxin, Diuretics)        [facility]      Family History   Problem Relation Age of Onset   • Heart Disorder Father    • Heart Attack Father    • Hypertension Father    • Hypertension Mother    • Other (Other) Mother         unspecified CVA   • Hypertension Other edema none. Orthopnea no. Palpitations none.  PND (paroxsymal nocturnal dyspnea) none.  Sees Dr. Luz Harley  for his cardiac status-last visit 6 months   Gastrointestinal:   Patient denies abdominal pain, acid reflux, blood in stool, vomiting, nausea, heartburn d Hearing Assessed via: Whispered Voice  EYES: PERRLA, EOMI, conjunctiva are clear  NECK:   Carotid bruit: none. Cervical lymph nodes: unremarkable. JVD: none. Range of Motion: normal.   Thyroid: unremarkable.   No obvious nodule noted  HEART:   Clicks: ELECTROCARDIOGRAM, COMPLETE  -     BASIC METABOLIC PANEL (8)    Postoperative hypothyroidism managed at [facility]    Malignant neoplasm of thyroid gland [facility]) managed at [facility]    Pulmonary nodules managed by [place]monology at [facility]

## 2020-09-03 ENCOUNTER — TELEPHONE (OUTPATIENT)
Dept: INTERNAL MEDICINE CLINIC | Facility: CLINIC | Age: 80
End: 2020-09-03

## 2020-09-03 NOTE — TELEPHONE ENCOUNTER
Pt notified of provider response and verbalized understanding. Pt requesting copy of EKG to give to Dr. Rachell Keene. Pt also requesting for MyChart to be sent w/ what EKG read. MyChart sent w/ provider notes and copy of EKG placed at  for pt to .

## 2020-09-03 NOTE — TELEPHONE ENCOUNTER
Please let patient know I did review the ultrasound of the neck and this should be monitored by the physician at [facility].   Also did let the patient know that I did review the EKG he does have an occasional irregular heartbeat PVC-which may be of no va

## 2020-09-11 ENCOUNTER — HOSPITAL ENCOUNTER (OUTPATIENT)
Dept: BONE DENSITY | Age: 80
Discharge: HOME | End: 2020-09-11
Attending: INTERNAL MEDICINE
Payer: MEDICARE

## 2020-09-11 DIAGNOSIS — M85.89 OSTEOPENIA OF MULTIPLE SITES: Chronic | ICD-10-CM

## 2020-09-11 PROCEDURE — 77080 DXA BONE DENSITY AXIAL: CPT | Performed by: INTERNAL MEDICINE

## 2020-09-22 LAB
BUN: 15 MG/DL (ref 7–25)
CALCIUM: 9 MG/DL (ref 8.6–10.3)
CARBON DIOXIDE: 27 MMOL/L (ref 20–32)
CHLORIDE: 98 MMOL/L (ref 98–110)
CREATININE: 0.75 MG/DL (ref 0.7–1.18)
EGFR IF AFRICN AM: 101 ML/MIN/1.73M2
EGFR IF NONAFRICN AM: 87 ML/MIN/1.73M2
GLUCOSE: 88 MG/DL (ref 65–99)
POTASSIUM: 3.9 MMOL/L (ref 3.5–5.3)
SODIUM: 134 MMOL/L (ref 135–146)

## 2020-09-23 ENCOUNTER — TELEPHONE (OUTPATIENT)
Dept: INTERNAL MEDICINE CLINIC | Facility: CLINIC | Age: 80
End: 2020-09-23

## 2020-09-23 DIAGNOSIS — E87.1 HYPONATREMIA: Primary | ICD-10-CM

## 2020-09-23 NOTE — TELEPHONE ENCOUNTER
Radha Gaston MD   9/22/2020 10:24 AM      Reviewed results   BMP is unremarkable.  Sodium is borderline low.  Recommend BMP to be done in 6 weeks     Pt notified of results and provider instructions. Pt verbalized understanding. Repeat lab ordered.

## 2020-12-29 ENCOUNTER — OFFICE VISIT (OUTPATIENT)
Dept: INTERNAL MEDICINE CLINIC | Facility: CLINIC | Age: 80
End: 2020-12-29
Payer: MEDICARE

## 2020-12-29 VITALS
HEIGHT: 67 IN | BODY MASS INDEX: 24.45 KG/M2 | OXYGEN SATURATION: 99 % | DIASTOLIC BLOOD PRESSURE: 66 MMHG | HEART RATE: 67 BPM | WEIGHT: 155.81 LBS | RESPIRATION RATE: 16 BRPM | TEMPERATURE: 98 F | SYSTOLIC BLOOD PRESSURE: 130 MMHG

## 2020-12-29 DIAGNOSIS — R41.0 CONFUSION: Primary | ICD-10-CM

## 2020-12-29 DIAGNOSIS — R53.83 FATIGUE, UNSPECIFIED TYPE: ICD-10-CM

## 2020-12-29 PROCEDURE — 3078F DIAST BP <80 MM HG: CPT | Performed by: INTERNAL MEDICINE

## 2020-12-29 PROCEDURE — 3008F BODY MASS INDEX DOCD: CPT | Performed by: INTERNAL MEDICINE

## 2020-12-29 PROCEDURE — 3075F SYST BP GE 130 - 139MM HG: CPT | Performed by: INTERNAL MEDICINE

## 2020-12-29 PROCEDURE — 99213 OFFICE O/P EST LOW 20 MIN: CPT | Performed by: INTERNAL MEDICINE

## 2020-12-29 NOTE — PATIENT INSTRUCTIONS
Reassured patient highly unlikely there is anything wrong however he can get an opinion from a neurologist

## 2020-12-29 NOTE — PROGRESS NOTES
Porfirio Mccann  1940 is a 79year old male. Patient presents with:  Weakness      HPI:     Patient stating that the last few days he has been taking his oral temperature at home temperatures are generally remain in the range of 97.5.   Patient con 04/07/2017   • Prostate hypertrophy 2/8/2016   • Thalassemia minor    • Thyroid cancer ([facility]     Dr. Noemí N[facility]ñez at [facility]   • Unspecified essential hypertension    • Varicocele       Social History:  Social History    Tobacco Use      Smoking status: Never Smoker no tremors seen . Kernigs Sign: absent. Mental Status: Alert & oriented x 3. Motor: normal strength bilaterally. Muscle Bulk: normal .   Plantars: downgoing bilaterally. Reflexes: normal.   Sensory: normal sensation.    Tone: normal.       ASSESSM

## 2021-03-03 DIAGNOSIS — Z23 NEED FOR VACCINATION: ICD-10-CM

## 2021-03-09 RX ORDER — TAMSULOSIN HYDROCHLORIDE 0.4 MG/1
CAPSULE ORAL
Qty: 90 CAPSULE | Refills: 0 | Status: SHIPPED | OUTPATIENT
Start: 2021-03-09 | End: 2021-06-09

## 2021-03-09 NOTE — TELEPHONE ENCOUNTER
Requesting tamsulosin (FLOMAX) cap  LOV: 12/29/20  RTC: 3 months  Last Relevant Labs: 9/21/20  Filled: 9/1/20 #30 with 0 refills    Future Appointments   Date Time Provider Boubacar Molina   6/16/2021 10:45 AM Bo Murry MD [address] [place] [address]

## 2021-04-05 ENCOUNTER — TELEPHONE (OUTPATIENT)
Dept: CASE MANAGEMENT | Age: 81
End: 2021-04-05

## 2021-04-05 NOTE — TELEPHONE ENCOUNTER
Pt informed is eligible for 2021 Medicare Advantage Supervisit, states would like to schedule in September and will call back to schedule.

## 2021-06-09 RX ORDER — TAMSULOSIN HYDROCHLORIDE 0.4 MG/1
CAPSULE ORAL
Qty: 90 CAPSULE | Refills: 0 | Status: SHIPPED | OUTPATIENT
Start: 2021-06-09 | End: 2021-09-26

## 2021-09-23 NOTE — TELEPHONE ENCOUNTER
Protocol None    Requesting:   tamsulosin (FLOMAX) cap 90 capsule 0 6/9/2021    Sig:   TAKE 1 CAPSULE EVERY DAY 30 MINUTES AFTER DINNER       LOV: 12/29/20  RTC: 3 months (around 3/29/2021). .    Recent Labs: 8/12/20    Upcoming OV None

## 2021-09-26 RX ORDER — TAMSULOSIN HYDROCHLORIDE 0.4 MG/1
CAPSULE ORAL
Qty: 90 CAPSULE | Refills: 0 | Status: SHIPPED | OUTPATIENT
Start: 2021-09-26 | End: 2021-12-29

## 2021-11-15 ENCOUNTER — TELEPHONE (OUTPATIENT)
Dept: INTERNAL MEDICINE CLINIC | Facility: CLINIC | Age: 81
End: 2021-11-15

## 2021-11-15 DIAGNOSIS — D56.3 THALASSEMIA MINOR: ICD-10-CM

## 2021-11-15 DIAGNOSIS — R53.83 FATIGUE, UNSPECIFIED TYPE: ICD-10-CM

## 2021-11-15 DIAGNOSIS — Z85.850 HISTORY OF THYROID CANCER: ICD-10-CM

## 2021-11-15 DIAGNOSIS — E03.9 HYPOTHYROIDISM, UNSPECIFIED TYPE: ICD-10-CM

## 2021-11-15 DIAGNOSIS — Z00.00 BLOOD TESTS FOR ROUTINE GENERAL PHYSICAL EXAMINATION: ICD-10-CM

## 2021-11-15 DIAGNOSIS — I10 ESSENTIAL HYPERTENSION, BENIGN: ICD-10-CM

## 2021-11-15 DIAGNOSIS — E89.0 POSTOPERATIVE HYPOTHYROIDISM: ICD-10-CM

## 2021-11-15 DIAGNOSIS — R41.0 CONFUSION: Primary | ICD-10-CM

## 2021-11-15 DIAGNOSIS — E87.1 HYPONATREMIA: ICD-10-CM

## 2021-11-15 RX ORDER — OMEPRAZOLE 20 MG/1
CAPSULE, DELAYED RELEASE ORAL
Qty: 90 CAPSULE | Refills: 0 | Status: SHIPPED | OUTPATIENT
Start: 2021-11-15

## 2021-11-15 NOTE — TELEPHONE ENCOUNTER
Pt scheduled MA supervisit, pt would like to get labs done prior to the appt,  B12, c-reactive protein, sed rate, cbc w/ differential, and comp metabolic panel, already in the system ,but  by the time of his appt. Please place necessary orders.

## 2021-11-15 NOTE — TELEPHONE ENCOUNTER
Due for MA Supervisit - please contact pt to schedule. No protocol     Last refill:  Omeprazole 20 MG Oral Tab EC 90 tablet 3 9/1/2020    Sig:   Take 1 tablet by mouth daily.        LOV: 12/29/2020 Dr Altagracia Cortes RTC 3 months  No FOV scheduled

## 2021-12-20 ENCOUNTER — TELEPHONE (OUTPATIENT)
Dept: INTERNAL MEDICINE CLINIC | Facility: CLINIC | Age: 81
End: 2021-12-20

## 2021-12-20 NOTE — TELEPHONE ENCOUNTER
Patient dropping off lab results from [address] [place] [address], placed in provider's fax folder. Pt would like to know if there are any labs that were ordered and he didn't get done, would like a call back.

## 2021-12-20 NOTE — TELEPHONE ENCOUNTER
Spoke with patient regarding which labs still need to be completed (CRP, ESR, B12, folate, UA, TSH). Patient verbalized understanding and will go back to the lab to have those completed.

## 2021-12-30 RX ORDER — TAMSULOSIN HYDROCHLORIDE 0.4 MG/1
0.4 CAPSULE ORAL
Qty: 90 CAPSULE | Refills: 0 | Status: SHIPPED | OUTPATIENT
Start: 2021-12-30 | End: 2022-01-04

## 2021-12-30 NOTE — TELEPHONE ENCOUNTER
No Protocol     Requesting:tamsulosin 0.4mg      LOV: 12/29/20   RTC: 3 months   Filled: 9/26/21 #90 0refills   Recent Labs: 12/21/21     Upcoming OV: 1/4/22 with SV

## 2022-01-04 ENCOUNTER — OFFICE VISIT (OUTPATIENT)
Dept: INTERNAL MEDICINE CLINIC | Facility: CLINIC | Age: 82
End: 2022-01-04
Payer: MEDICARE

## 2022-01-04 VITALS
TEMPERATURE: 98 F | DIASTOLIC BLOOD PRESSURE: 76 MMHG | RESPIRATION RATE: 16 BRPM | WEIGHT: 155.81 LBS | OXYGEN SATURATION: 99 % | HEART RATE: 75 BPM | BODY MASS INDEX: 24.45 KG/M2 | HEIGHT: 67 IN | SYSTOLIC BLOOD PRESSURE: 130 MMHG

## 2022-01-04 DIAGNOSIS — G47.00 INSOMNIA, UNSPECIFIED TYPE: ICD-10-CM

## 2022-01-04 DIAGNOSIS — E89.0 POSTOPERATIVE HYPOTHYROIDISM: Chronic | ICD-10-CM

## 2022-01-04 DIAGNOSIS — M85.89 OSTEOPENIA OF MULTIPLE SITES: Chronic | ICD-10-CM

## 2022-01-04 DIAGNOSIS — I70.0 ATHEROSCLEROSIS OF AORTA (HCC): ICD-10-CM

## 2022-01-04 DIAGNOSIS — N40.1 BENIGN PROSTATIC HYPERPLASIA WITH POST-VOID DRIBBLING: ICD-10-CM

## 2022-01-04 DIAGNOSIS — H61.23 BILATERAL IMPACTED CERUMEN: ICD-10-CM

## 2022-01-04 DIAGNOSIS — N39.43 BENIGN PROSTATIC HYPERPLASIA WITH POST-VOID DRIBBLING: ICD-10-CM

## 2022-01-04 DIAGNOSIS — D56.3 THALASSEMIA MINOR: Chronic | ICD-10-CM

## 2022-01-04 DIAGNOSIS — I10 ESSENTIAL HYPERTENSION, BENIGN: Chronic | ICD-10-CM

## 2022-01-04 DIAGNOSIS — Z85.850 HISTORY OF THYROID CANCER: Chronic | ICD-10-CM

## 2022-01-04 DIAGNOSIS — Z00.00 ROUTINE PHYSICAL EXAMINATION: Primary | ICD-10-CM

## 2022-01-04 PROCEDURE — 96160 PT-FOCUSED HLTH RISK ASSMT: CPT | Performed by: INTERNAL MEDICINE

## 2022-01-04 PROCEDURE — 93000 ELECTROCARDIOGRAM COMPLETE: CPT | Performed by: INTERNAL MEDICINE

## 2022-01-04 PROCEDURE — 3075F SYST BP GE 130 - 139MM HG: CPT | Performed by: INTERNAL MEDICINE

## 2022-01-04 PROCEDURE — 3008F BODY MASS INDEX DOCD: CPT | Performed by: INTERNAL MEDICINE

## 2022-01-04 PROCEDURE — 3078F DIAST BP <80 MM HG: CPT | Performed by: INTERNAL MEDICINE

## 2022-01-04 PROCEDURE — G0439 PPPS, SUBSEQ VISIT: HCPCS | Performed by: INTERNAL MEDICINE

## 2022-01-04 PROCEDURE — 99397 PER PM REEVAL EST PAT 65+ YR: CPT | Performed by: INTERNAL MEDICINE

## 2022-01-04 RX ORDER — TAMSULOSIN HYDROCHLORIDE 0.4 MG/1
0.8 CAPSULE ORAL
Qty: 180 CAPSULE | Refills: 0 | Status: SHIPPED | OUTPATIENT
Start: 2022-01-04

## 2022-01-04 NOTE — PROGRESS NOTES
Patient Office Visit    ASSESSMENT AND PLAN:   (Z00.00) Routine physical examination  (primary encounter diagnosis)  Plan: ELECTROCARDIOGRAM, COMPLETE  Note: reviewed test results with patient. Continue yearly blood tests.  EKG with sinus bradycardia and pr Patient Active Problem List:     Essential hypertension, benign     Hypothyroid     Pulmonary nodules     History of thyroid cancer     Osteopenia of multiple sites     Thalassemia minor     Malignant neoplasm of thyroid gland (HCC)      No orders of the LUMBAR/SACRAL, ADD'L LEVEL  6/21/2013    Procedure: TRANSFORAMINAL EPIDURAL - LUMBAR;  Surgeon: Osmar Galloway MD;  Location: [facility]   • INJECTION, ANESTHETIC/STEROID, TRANSFORAMINAL EPIDURAL; LUMBAR/SACRAL, ADD'L LEVEL  8/15/201 capsule, Rfl: 0  OMEPRAZOLE 20 MG Oral Capsule Delayed Release, TAKE 1 CAPSULE EVERY DAY (Patient taking differently: as needed.), Disp: 90 capsule, Rfl: 0  SYNTHROID 112 MCG Oral Tab, Take 1 tablet (112 mcg total) by mouth daily. , Disp: 90 tablet, Rfl: 3 impaction noted bilaterally (right greater than left)  Eyes: pupils reactive bilaterally  Neck: No thyroidmegaly  Cardiovascular: bradycardic with extra beat noted   Pulmonary/Chest: CTA bilaterally with no wheezes  Abdominal: Soft NT normal Bowel sounds

## 2022-01-04 NOTE — PATIENT INSTRUCTIONS
You can take hydrochlorothiazide every other day  Please follow up with your endocrinologist in regards to when you should have a repeat ultrasound of the neck/thyroid    Please increase your flomax to 2 tablets a night (0.8mg) and please follow up with

## 2022-02-02 ENCOUNTER — TELEPHONE (OUTPATIENT)
Dept: INTERNAL MEDICINE CLINIC | Facility: CLINIC | Age: 82
End: 2022-02-02

## 2022-02-02 NOTE — TELEPHONE ENCOUNTER
Incoming fax for medication clarification   Please clarify which direction are accurate from Tamsulosin 0.4 mg - take 2 capsules 30 minutes after dinner or take 1 capsule after dinner? LOV:  1/4/22 Dr Abram Agustin   (N40.1,  N39.43) Benign prostatic hyperplasia with post-void dribbling  Plan: will increase flomax to 0.8 mg and reminded patient to follow up with Dr. Shilo Barnhart of correct rx.

## 2022-03-29 ENCOUNTER — LAB ENCOUNTER (OUTPATIENT)
Dept: LAB | Age: 82
End: 2022-03-29
Attending: INTERNAL MEDICINE
Payer: MEDICARE

## 2022-03-29 ENCOUNTER — OFFICE VISIT (OUTPATIENT)
Dept: INTERNAL MEDICINE CLINIC | Facility: CLINIC | Age: 82
End: 2022-03-29
Payer: MEDICARE

## 2022-03-29 VITALS
OXYGEN SATURATION: 99 % | TEMPERATURE: 98 F | DIASTOLIC BLOOD PRESSURE: 70 MMHG | HEART RATE: 87 BPM | WEIGHT: 153.81 LBS | SYSTOLIC BLOOD PRESSURE: 106 MMHG | RESPIRATION RATE: 16 BRPM | BODY MASS INDEX: 24.14 KG/M2 | HEIGHT: 67 IN

## 2022-03-29 DIAGNOSIS — R35.0 URINE FREQUENCY: Primary | ICD-10-CM

## 2022-03-29 LAB
ANION GAP SERPL CALC-SCNC: 5 MMOL/L (ref 0–18)
BILIRUB UR QL STRIP.AUTO: NEGATIVE
BUN BLD-MCNC: 12 MG/DL (ref 7–18)
CALCIUM BLD-MCNC: 8.8 MG/DL (ref 8.5–10.1)
CHLORIDE SERPL-SCNC: 98 MMOL/L (ref 98–112)
CO2 SERPL-SCNC: 30 MMOL/L (ref 21–32)
CREAT BLD-MCNC: 0.84 MG/DL
FASTING STATUS PATIENT QL REPORTED: NO
GLUCOSE BLD-MCNC: 95 MG/DL (ref 70–99)
GLUCOSE UR STRIP.AUTO-MCNC: NEGATIVE MG/DL
HYALINE CASTS #/AREA URNS AUTO: PRESENT /LPF
KETONES UR STRIP.AUTO-MCNC: NEGATIVE MG/DL
NITRITE UR QL STRIP.AUTO: NEGATIVE
OSMOLALITY SERPL CALC.SUM OF ELEC: 276 MOSM/KG (ref 275–295)
PH UR STRIP.AUTO: 7 [PH] (ref 5–8)
POTASSIUM SERPL-SCNC: 4.2 MMOL/L (ref 3.5–5.1)
PROT UR STRIP.AUTO-MCNC: 100 MG/DL
RBC #/AREA URNS AUTO: >10 /HPF
SODIUM SERPL-SCNC: 133 MMOL/L (ref 136–145)
SP GR UR STRIP.AUTO: 1.02 (ref 1–1.03)
UROBILINOGEN UR STRIP.AUTO-MCNC: <2 MG/DL
WBC #/AREA URNS AUTO: >50 /HPF

## 2022-03-29 PROCEDURE — 3078F DIAST BP <80 MM HG: CPT | Performed by: INTERNAL MEDICINE

## 2022-03-29 PROCEDURE — 3008F BODY MASS INDEX DOCD: CPT | Performed by: INTERNAL MEDICINE

## 2022-03-29 PROCEDURE — 87077 CULTURE AEROBIC IDENTIFY: CPT

## 2022-03-29 PROCEDURE — 3074F SYST BP LT 130 MM HG: CPT | Performed by: INTERNAL MEDICINE

## 2022-03-29 PROCEDURE — 99213 OFFICE O/P EST LOW 20 MIN: CPT | Performed by: INTERNAL MEDICINE

## 2022-03-29 PROCEDURE — 80048 BASIC METABOLIC PNL TOTAL CA: CPT | Performed by: INTERNAL MEDICINE

## 2022-03-29 PROCEDURE — 87086 URINE CULTURE/COLONY COUNT: CPT

## 2022-03-29 PROCEDURE — 36415 COLL VENOUS BLD VENIPUNCTURE: CPT | Performed by: INTERNAL MEDICINE

## 2022-03-29 PROCEDURE — 81001 URINALYSIS AUTO W/SCOPE: CPT | Performed by: INTERNAL MEDICINE

## 2022-03-29 NOTE — PATIENT INSTRUCTIONS
May increase the tamsulosin to twice a day. If no relief will have him see the urologist back again.

## 2022-03-31 ENCOUNTER — TELEPHONE (OUTPATIENT)
Dept: INTERNAL MEDICINE CLINIC | Facility: CLINIC | Age: 82
End: 2022-03-31

## 2022-03-31 RX ORDER — SULFAMETHOXAZOLE AND TRIMETHOPRIM 800; 160 MG/1; MG/1
1 TABLET ORAL 2 TIMES DAILY
Qty: 28 TABLET | Refills: 0 | Status: SHIPPED | OUTPATIENT
Start: 2022-03-31 | End: 2022-04-14

## 2022-03-31 NOTE — TELEPHONE ENCOUNTER
Spoke with patient notified U/A results are in, but UCX still in process. Please review and advise. Thank you.

## 2022-03-31 NOTE — TELEPHONE ENCOUNTER
Pt is requesting a call back to discuss his lab test and urine test results. Pt wants to know if he needs medication.

## 2022-04-11 ENCOUNTER — TELEMEDICINE (OUTPATIENT)
Dept: INTERNAL MEDICINE CLINIC | Facility: CLINIC | Age: 82
End: 2022-04-11
Payer: MEDICARE

## 2022-04-11 ENCOUNTER — LAB ENCOUNTER (OUTPATIENT)
Dept: LAB | Age: 82
End: 2022-04-11
Attending: INTERNAL MEDICINE
Payer: MEDICARE

## 2022-04-11 ENCOUNTER — TELEPHONE (OUTPATIENT)
Dept: INTERNAL MEDICINE CLINIC | Facility: CLINIC | Age: 82
End: 2022-04-11

## 2022-04-11 DIAGNOSIS — R50.9 FEVER, UNSPECIFIED FEVER CAUSE: Primary | ICD-10-CM

## 2022-04-11 DIAGNOSIS — R50.9 FEVER, UNSPECIFIED FEVER CAUSE: ICD-10-CM

## 2022-04-11 PROCEDURE — 99213 OFFICE O/P EST LOW 20 MIN: CPT | Performed by: INTERNAL MEDICINE

## 2022-04-11 NOTE — TELEPHONE ENCOUNTER
Future Appointments   Date Time Provider Boubacar Molina   4/11/2022 10:30 AM Dionne Sosa, DO [facility] [place]

## 2022-04-11 NOTE — PROGRESS NOTES
Virtual Telephone Check-In    This visit is conducted using Telemedicine with live, interactive video and audio. Patient resides in [place]   Patient understands and accepts financial responsibility for any deductible, co-insurance and/or co-pays associated with this service. Telehealth outside of [employer] Verbal Consent   I conducted a telehealth visit with Zonia Quigley on 4/11/2022   which was completed using two-way, real-time interactive audio and video communication. This has been done in good apolinar to provide continuity of care in the best interest of the provider-patient relationship, due to the COVID -19 public health crisis/national emergency where restrictions of face-to-face office visits are ongoing. Every conscious effort was taken to allow for sufficient and adequate time to complete the visit. The patient was made aware of the limitations of the telehealth visit, including treatment limitations as no physical exam could be performed. The patient was advised to call 911 or to go to the ER in case there was an emergency. The patient was also advised of the potential privacy & security concerns related to the telehealth platform. The patient was made aware of where to find [facility] notice of privacy practices, telehealth consent form and other related consent forms and documents. which are located on the [facility] website. The patient verbally agreed to telehealth consent form, related consents and the risks discussed. Lastly, the patient confirmed that they were in [place]. Included in this visit, time may have been spent reviewing labs, medications, radiology tests and decision making. Appropriate medical decision-making and tests are ordered as detailed in the plan of care above. Coding/billing information is submitted for this visit based on complexity of care and/or time spent for the visit.   Time spent: 15 minutes   HPI: Adalberto Woods is an 81year old male who is calling about fevers. He was recently diagnosed with a UTI and was taking his antibiotics for 10 days. He went to his friend's house 3 days ago and after coming back had fevers of 100.5-101. Tylenol bring it down. Has loss of appetite. Overall feels his urinary symptoms are better. Feeling tired. No diarrhea, rash. No cough/cold/runny nose. He would like to get tested for COVID as well. ROS:  General: Feels well overall  Skin: Denies any unusual skin lesions  Eyes: Denies blurred vision or double vision  All systems negative, except for above  Physical:  GENERAL: Alert and oriented, well developed, well nourished,in no apparent distress  SKIN: no rashes,no suspicious lesions on face  LUNGS: no audible wheezing  PSYCH: pleasant, appropriate mood and affect    Assessment and Plan  (R50.9) Fever, unspecified fever cause  (primary encounter diagnosis)  Plan: SARS-COV-2 BY PCR ([facility])  Note: will check for covid.  If negative will start work up for fever which includes chest x ray, UA and CBC  RTC in 2 days  [address] [place] [address]

## 2022-04-11 NOTE — TELEPHONE ENCOUNTER
Pt called stating he was recently prescribed sulfamethoxazole-trimethoprim -160 MG Oral Tab per tablet. Pt states he has been having a temperature for the last few days. Pt states it started on Friday, he had a temperature of 100 degrees, every morning since Friday he has had a fever of 102, in the afternoon it drops to 98, and then in the evening it goes back up to 100 degrees. Pt states he has had a loss in appetitive, pt denies nausea, pt states he has a slight headache, and did an at home rapid test for covid that came back negative. Pt requesting recommendations, if he should try a new abx, pt also is unsure if he needs a PCR covid test. Please advise.

## 2022-04-12 LAB — SARS-COV-2 RNA RESP QL NAA+PROBE: NOT DETECTED

## 2022-04-13 ENCOUNTER — APPOINTMENT (OUTPATIENT)
Dept: CT IMAGING | Facility: HOSPITAL | Age: 82
End: 2022-04-13
Attending: EMERGENCY MEDICINE
Payer: MEDICARE

## 2022-04-13 ENCOUNTER — APPOINTMENT (OUTPATIENT)
Dept: GENERAL RADIOLOGY | Facility: HOSPITAL | Age: 82
End: 2022-04-13
Attending: EMERGENCY MEDICINE
Payer: MEDICARE

## 2022-04-13 ENCOUNTER — HOSPITAL ENCOUNTER (EMERGENCY)
Facility: HOSPITAL | Age: 82
Discharge: HOME | End: 2022-04-13
Attending: EMERGENCY MEDICINE
Payer: MEDICARE

## 2022-04-13 VITALS
OXYGEN SATURATION: 97 % | SYSTOLIC BLOOD PRESSURE: 108 MMHG | BODY MASS INDEX: 23.58 KG/M2 | RESPIRATION RATE: 24 BRPM | DIASTOLIC BLOOD PRESSURE: 66 MMHG | HEART RATE: 67 BPM | TEMPERATURE: 98 F | WEIGHT: 152 LBS | HEIGHT: 67.5 IN

## 2022-04-13 DIAGNOSIS — J12.9 VIRAL PNEUMONIA: ICD-10-CM

## 2022-04-13 DIAGNOSIS — R93.2 ABNORMAL LIVER CT: ICD-10-CM

## 2022-04-13 DIAGNOSIS — N30.01 ACUTE CYSTITIS WITH HEMATURIA: Primary | ICD-10-CM

## 2022-04-13 DIAGNOSIS — K57.90 DIVERTICULOSIS: ICD-10-CM

## 2022-04-13 DIAGNOSIS — R74.01 TRANSAMINITIS: ICD-10-CM

## 2022-04-13 LAB
ALBUMIN SERPL-MCNC: 3.4 G/DL (ref 3.4–5)
ALBUMIN/GLOB SERPL: 1 {RATIO} (ref 1–2)
ALP LIVER SERPL-CCNC: 104 U/L
ALT SERPL-CCNC: 357 U/L
ANION GAP SERPL CALC-SCNC: 10 MMOL/L (ref 0–18)
AST SERPL-CCNC: 344 U/L (ref 15–37)
BASOPHILS # BLD AUTO: 0.01 X10(3) UL (ref 0–0.2)
BASOPHILS NFR BLD AUTO: 0.3 %
BILIRUB SERPL-MCNC: 0.6 MG/DL (ref 0.1–2)
BILIRUB UR QL STRIP.AUTO: NEGATIVE
BUN BLD-MCNC: 21 MG/DL (ref 7–18)
CALCIUM BLD-MCNC: 8.2 MG/DL (ref 8.5–10.1)
CHLORIDE SERPL-SCNC: 94 MMOL/L (ref 98–112)
CO2 SERPL-SCNC: 21 MMOL/L (ref 21–32)
CREAT BLD-MCNC: 1.21 MG/DL
EOSINOPHIL # BLD AUTO: 0.07 X10(3) UL (ref 0–0.7)
EOSINOPHIL NFR BLD AUTO: 1.9 %
ERYTHROCYTE [DISTWIDTH] IN BLOOD BY AUTOMATED COUNT: 17.9 %
GLOBULIN PLAS-MCNC: 3.4 G/DL (ref 2.8–4.4)
GLUCOSE BLD-MCNC: 118 MG/DL (ref 70–99)
GLUCOSE UR STRIP.AUTO-MCNC: NEGATIVE MG/DL
HBV CORE IGM SER QL: NONREACTIVE
HBV SURFACE AG SERPL QL IA: NONREACTIVE
HCT VFR BLD AUTO: 33.2 %
HCV AB SERPL QL IA: NONREACTIVE
HGB BLD-MCNC: 11.2 G/DL
IMM GRANULOCYTES # BLD AUTO: 0.02 X10(3) UL (ref 0–1)
IMM GRANULOCYTES NFR BLD: 0.5 %
KETONES UR STRIP.AUTO-MCNC: NEGATIVE MG/DL
LYMPHOCYTES # BLD AUTO: 0.55 X10(3) UL (ref 1–4)
LYMPHOCYTES NFR BLD AUTO: 14.7 %
MCH RBC QN AUTO: 20.4 PG (ref 26–34)
MCHC RBC AUTO-ENTMCNC: 33.7 G/DL (ref 31–37)
MCV RBC AUTO: 60.6 FL
MONOCYTES # BLD AUTO: 0.14 X10(3) UL (ref 0.1–1)
MONOCYTES NFR BLD AUTO: 3.7 %
NEUTROPHILS # BLD AUTO: 2.96 X10 (3) UL (ref 1.5–7.7)
NEUTROPHILS # BLD AUTO: 2.96 X10(3) UL (ref 1.5–7.7)
NEUTROPHILS NFR BLD AUTO: 78.9 %
NITRITE UR QL STRIP.AUTO: NEGATIVE
OSMOLALITY SERPL CALC.SUM OF ELEC: 264 MOSM/KG (ref 275–295)
PH UR STRIP.AUTO: 5 [PH] (ref 5–8)
PLATELET # BLD AUTO: 101 10(3)UL (ref 150–450)
PLATELET MORPHOLOGY: NORMAL
POTASSIUM SERPL-SCNC: 5 MMOL/L (ref 3.5–5.1)
PROT SERPL-MCNC: 6.8 G/DL (ref 6.4–8.2)
PROT UR STRIP.AUTO-MCNC: 100 MG/DL
RBC # BLD AUTO: 5.48 X10(6)UL
RBC UR QL AUTO: NEGATIVE
SARS-COV-2 RNA RESP QL NAA+PROBE: NOT DETECTED
SODIUM SERPL-SCNC: 125 MMOL/L (ref 136–145)
SP GR UR STRIP.AUTO: >1.03 (ref 1–1.03)
UROBILINOGEN UR STRIP.AUTO-MCNC: 2 MG/DL
WBC # BLD AUTO: 3.8 X10(3) UL (ref 4–11)

## 2022-04-13 PROCEDURE — 87086 URINE CULTURE/COLONY COUNT: CPT | Performed by: EMERGENCY MEDICINE

## 2022-04-13 PROCEDURE — 80053 COMPREHEN METABOLIC PANEL: CPT | Performed by: EMERGENCY MEDICINE

## 2022-04-13 PROCEDURE — 96375 TX/PRO/DX INJ NEW DRUG ADDON: CPT

## 2022-04-13 PROCEDURE — 99285 EMERGENCY DEPT VISIT HI MDM: CPT

## 2022-04-13 PROCEDURE — 74177 CT ABD & PELVIS W/CONTRAST: CPT | Performed by: EMERGENCY MEDICINE

## 2022-04-13 PROCEDURE — 85025 COMPLETE CBC W/AUTO DIFF WBC: CPT | Performed by: EMERGENCY MEDICINE

## 2022-04-13 PROCEDURE — 80074 ACUTE HEPATITIS PANEL: CPT | Performed by: EMERGENCY MEDICINE

## 2022-04-13 PROCEDURE — 71045 X-RAY EXAM CHEST 1 VIEW: CPT | Performed by: EMERGENCY MEDICINE

## 2022-04-13 PROCEDURE — 81001 URINALYSIS AUTO W/SCOPE: CPT | Performed by: EMERGENCY MEDICINE

## 2022-04-13 PROCEDURE — 96365 THER/PROPH/DIAG IV INF INIT: CPT

## 2022-04-13 PROCEDURE — 87040 BLOOD CULTURE FOR BACTERIA: CPT | Performed by: EMERGENCY MEDICINE

## 2022-04-13 PROCEDURE — 96361 HYDRATE IV INFUSION ADD-ON: CPT

## 2022-04-13 RX ORDER — DIPHENHYDRAMINE HYDROCHLORIDE 50 MG/ML
25 INJECTION INTRAMUSCULAR; INTRAVENOUS ONCE
Status: COMPLETED | OUTPATIENT
Start: 2022-04-13 | End: 2022-04-13

## 2022-04-13 RX ORDER — AMOXICILLIN AND CLAVULANATE POTASSIUM 875; 125 MG/1; MG/1
1 TABLET, FILM COATED ORAL 2 TIMES DAILY
Qty: 20 TABLET | Refills: 0 | Status: SHIPPED | OUTPATIENT
Start: 2022-04-13 | End: 2022-04-23

## 2022-04-13 RX ORDER — IOHEXOL 350 MG/ML
80 INJECTION, SOLUTION INTRAVENOUS
Status: COMPLETED | OUTPATIENT
Start: 2022-04-13 | End: 2022-04-13

## 2022-04-13 NOTE — ED INITIAL ASSESSMENT (HPI)
Arrives with c/o fever of 101 for 5-6 days, weakness. States not emptying bladder completely. Took two advil two to three hours ago.

## 2022-04-18 ENCOUNTER — HOSPITAL ENCOUNTER (OUTPATIENT)
Dept: GENERAL RADIOLOGY | Age: 82
Discharge: HOME | End: 2022-04-18
Attending: INTERNAL MEDICINE
Payer: MEDICARE

## 2022-04-18 ENCOUNTER — LAB ENCOUNTER (OUTPATIENT)
Dept: LAB | Age: 82
End: 2022-04-18
Attending: INTERNAL MEDICINE
Payer: MEDICARE

## 2022-04-18 ENCOUNTER — OFFICE VISIT (OUTPATIENT)
Dept: INTERNAL MEDICINE CLINIC | Facility: CLINIC | Age: 82
End: 2022-04-18
Payer: MEDICARE

## 2022-04-18 ENCOUNTER — TELEPHONE (OUTPATIENT)
Dept: INTERNAL MEDICINE CLINIC | Facility: CLINIC | Age: 82
End: 2022-04-18

## 2022-04-18 VITALS
HEART RATE: 87 BPM | SYSTOLIC BLOOD PRESSURE: 130 MMHG | HEIGHT: 67.5 IN | RESPIRATION RATE: 14 BRPM | WEIGHT: 152 LBS | OXYGEN SATURATION: 95 % | BODY MASS INDEX: 23.58 KG/M2 | TEMPERATURE: 98 F | DIASTOLIC BLOOD PRESSURE: 60 MMHG

## 2022-04-18 DIAGNOSIS — E87.1 HYPONATREMIA: ICD-10-CM

## 2022-04-18 DIAGNOSIS — R93.89 ABNORMAL CXR: ICD-10-CM

## 2022-04-18 DIAGNOSIS — Z87.898 HISTORY OF FEVER: Primary | ICD-10-CM

## 2022-04-18 DIAGNOSIS — D69.6 THROMBOCYTOPENIA (HCC): ICD-10-CM

## 2022-04-18 DIAGNOSIS — R82.90 ABNORMAL URINE: ICD-10-CM

## 2022-04-18 DIAGNOSIS — R74.8 ABNORMAL LIVER ENZYMES: ICD-10-CM

## 2022-04-18 LAB
ALBUMIN SERPL-MCNC: 3.5 G/DL (ref 3.4–5)
ALBUMIN/GLOB SERPL: 1.2 {RATIO} (ref 1–2)
ALP LIVER SERPL-CCNC: 95 U/L
ALT SERPL-CCNC: 178 U/L
ANION GAP SERPL CALC-SCNC: 5 MMOL/L (ref 0–18)
AST SERPL-CCNC: 90 U/L (ref 15–37)
BASOPHILS # BLD AUTO: 0.01 X10(3) UL (ref 0–0.2)
BASOPHILS NFR BLD AUTO: 0.3 %
BILIRUB SERPL-MCNC: 1 MG/DL (ref 0.1–2)
BILIRUB UR QL STRIP.AUTO: NEGATIVE
BUN BLD-MCNC: 6 MG/DL (ref 7–18)
CALCIUM BLD-MCNC: 8.8 MG/DL (ref 8.5–10.1)
CHLORIDE SERPL-SCNC: 96 MMOL/L (ref 98–112)
CO2 SERPL-SCNC: 27 MMOL/L (ref 21–32)
COLOR UR AUTO: YELLOW
CREAT BLD-MCNC: 0.64 MG/DL
CRP SERPL-MCNC: 0.76 MG/DL (ref ?–0.3)
EOSINOPHIL # BLD AUTO: 0.02 X10(3) UL (ref 0–0.7)
EOSINOPHIL NFR BLD AUTO: 0.6 %
ERYTHROCYTE [DISTWIDTH] IN BLOOD BY AUTOMATED COUNT: 17.7 %
ERYTHROCYTE [SEDIMENTATION RATE] IN BLOOD: 11 MM/HR
FASTING STATUS PATIENT QL REPORTED: NO
GLOBULIN PLAS-MCNC: 2.9 G/DL (ref 2.8–4.4)
GLUCOSE BLD-MCNC: 119 MG/DL (ref 70–99)
GLUCOSE UR STRIP.AUTO-MCNC: NEGATIVE MG/DL
HCT VFR BLD AUTO: 31.9 %
HGB BLD-MCNC: 10.5 G/DL
IMM GRANULOCYTES # BLD AUTO: 0.04 X10(3) UL (ref 0–1)
IMM GRANULOCYTES NFR BLD: 1.1 %
KETONES UR STRIP.AUTO-MCNC: NEGATIVE MG/DL
LEUKOCYTE ESTERASE UR QL STRIP.AUTO: NEGATIVE
LYMPHOCYTES # BLD AUTO: 0.71 X10(3) UL (ref 1–4)
LYMPHOCYTES NFR BLD AUTO: 19.9 %
MCH RBC QN AUTO: 20.2 PG (ref 26–34)
MCHC RBC AUTO-ENTMCNC: 32.9 G/DL (ref 31–37)
MCV RBC AUTO: 61.5 FL
MONOCYTES # BLD AUTO: 0.82 X10(3) UL (ref 0.1–1)
MONOCYTES NFR BLD AUTO: 23 %
NEUTROPHILS # BLD AUTO: 1.97 X10 (3) UL (ref 1.5–7.7)
NEUTROPHILS # BLD AUTO: 1.97 X10(3) UL (ref 1.5–7.7)
NEUTROPHILS NFR BLD AUTO: 55.1 %
NITRITE UR QL STRIP.AUTO: NEGATIVE
OSMOLALITY SERPL CALC.SUM OF ELEC: 265 MOSM/KG (ref 275–295)
PH UR STRIP.AUTO: 7 [PH] (ref 5–8)
PLATELET # BLD AUTO: 201 10(3)UL (ref 150–450)
POTASSIUM SERPL-SCNC: 4.4 MMOL/L (ref 3.5–5.1)
PROT SERPL-MCNC: 6.4 G/DL (ref 6.4–8.2)
PROT UR STRIP.AUTO-MCNC: 30 MG/DL
RBC # BLD AUTO: 5.19 X10(6)UL
RBC UR QL AUTO: NEGATIVE
SODIUM SERPL-SCNC: 128 MMOL/L (ref 136–145)
SP GR UR STRIP.AUTO: 1.01 (ref 1–1.03)
UROBILINOGEN UR STRIP.AUTO-MCNC: <2 MG/DL
WBC # BLD AUTO: 3.6 X10(3) UL (ref 4–11)

## 2022-04-18 PROCEDURE — 80053 COMPREHEN METABOLIC PANEL: CPT | Performed by: INTERNAL MEDICINE

## 2022-04-18 PROCEDURE — 3078F DIAST BP <80 MM HG: CPT | Performed by: INTERNAL MEDICINE

## 2022-04-18 PROCEDURE — 86140 C-REACTIVE PROTEIN: CPT | Performed by: INTERNAL MEDICINE

## 2022-04-18 PROCEDURE — 71046 X-RAY EXAM CHEST 2 VIEWS: CPT | Performed by: INTERNAL MEDICINE

## 2022-04-18 PROCEDURE — 85652 RBC SED RATE AUTOMATED: CPT | Performed by: INTERNAL MEDICINE

## 2022-04-18 PROCEDURE — 85025 COMPLETE CBC W/AUTO DIFF WBC: CPT | Performed by: INTERNAL MEDICINE

## 2022-04-18 PROCEDURE — 3075F SYST BP GE 130 - 139MM HG: CPT | Performed by: INTERNAL MEDICINE

## 2022-04-18 PROCEDURE — 81001 URINALYSIS AUTO W/SCOPE: CPT | Performed by: INTERNAL MEDICINE

## 2022-04-18 PROCEDURE — 3008F BODY MASS INDEX DOCD: CPT | Performed by: INTERNAL MEDICINE

## 2022-04-18 PROCEDURE — 36415 COLL VENOUS BLD VENIPUNCTURE: CPT | Performed by: INTERNAL MEDICINE

## 2022-04-18 PROCEDURE — 99214 OFFICE O/P EST MOD 30 MIN: CPT | Performed by: INTERNAL MEDICINE

## 2022-04-18 NOTE — TELEPHONE ENCOUNTER
Pt wife stated pt is not getting better after going to ER over the weekend and wanted to know if Dr Xiao Squires would be able to see her  sooner that 2:30pm today. Per wife, pt is still weak and has no appetite. Pt wife also wants to know if there is any more testing pt should do to figure out what is going on. Pt wife would like to speak with a nurse to go over Dr Crystal Carlisle recommendations. [phone] cell for wife Onel Robins.  Pt wife stated that if she doesn't answer her cell to please call the home [phone]    Future Appointments   Date Time Provider Boubacar Molina   4/18/2022  2:30 PM Edward Garcia MD [facility] [place]

## 2022-04-18 NOTE — PATIENT INSTRUCTIONS
Encourage patient to drink plenty of soups Gatorade. Call if symptoms signs worsen. See me on Saturday.   Pending labs prior to further recommendations  Patient advised to hold the nifedipine as well as the hydrochlorothiazide which he has held back for the last few days

## 2022-04-23 ENCOUNTER — OFFICE VISIT (OUTPATIENT)
Dept: INTERNAL MEDICINE CLINIC | Facility: CLINIC | Age: 82
End: 2022-04-23
Payer: COMMERCIAL

## 2022-04-23 VITALS
SYSTOLIC BLOOD PRESSURE: 128 MMHG | DIASTOLIC BLOOD PRESSURE: 72 MMHG | WEIGHT: 151.19 LBS | HEIGHT: 67.5 IN | HEART RATE: 75 BPM | RESPIRATION RATE: 12 BRPM | BODY MASS INDEX: 23.45 KG/M2 | OXYGEN SATURATION: 100 % | TEMPERATURE: 98 F

## 2022-04-23 DIAGNOSIS — R93.5 ABNORMAL CT OF THE ABDOMEN: ICD-10-CM

## 2022-04-23 DIAGNOSIS — E87.1 HYPONATREMIA: ICD-10-CM

## 2022-04-23 DIAGNOSIS — Z87.898 HISTORY OF FEVER: Primary | ICD-10-CM

## 2022-04-23 DIAGNOSIS — D56.3 THALASSEMIA MINOR: ICD-10-CM

## 2022-04-23 PROBLEM — N40.1 BENIGN PROSTATIC HYPERPLASIA WITH POST-VOID DRIBBLING: Chronic | Status: ACTIVE | Noted: 2022-01-04

## 2022-04-23 PROBLEM — R93.89 ABNORMAL CXR: Status: RESOLVED | Noted: 2022-04-18 | Resolved: 2022-04-23

## 2022-04-23 PROBLEM — N39.43 BENIGN PROSTATIC HYPERPLASIA WITH POST-VOID DRIBBLING: Chronic | Status: ACTIVE | Noted: 2022-01-04

## 2022-04-23 PROBLEM — D69.6 THROMBOCYTOPENIA (HCC): Status: RESOLVED | Noted: 2022-04-18 | Resolved: 2022-04-23

## 2022-04-23 PROBLEM — R82.90 ABNORMAL URINE: Status: RESOLVED | Noted: 2022-04-18 | Resolved: 2022-04-23

## 2022-04-23 PROBLEM — D69.6 THROMBOCYTOPENIA: Status: RESOLVED | Noted: 2022-04-18 | Resolved: 2022-04-23

## 2022-04-23 PROCEDURE — 3008F BODY MASS INDEX DOCD: CPT | Performed by: INTERNAL MEDICINE

## 2022-04-23 PROCEDURE — 3078F DIAST BP <80 MM HG: CPT | Performed by: INTERNAL MEDICINE

## 2022-04-23 PROCEDURE — 3074F SYST BP LT 130 MM HG: CPT | Performed by: INTERNAL MEDICINE

## 2022-04-23 PROCEDURE — 99214 OFFICE O/P EST MOD 30 MIN: CPT | Performed by: INTERNAL MEDICINE

## 2022-04-23 RX ORDER — TAMSULOSIN HYDROCHLORIDE 0.4 MG/1
0.4 CAPSULE ORAL DAILY
COMMUNITY

## 2022-04-23 NOTE — PATIENT INSTRUCTIONS
Patient apparently has been googling all abnormal labs and has multiple questions -all those questions were answered-basically the patient is asking for explanation of each abnormal lab she was answered to the best of my capacity.   Repeat blood work in about 4 to 5 days

## 2022-04-27 ENCOUNTER — LAB ENCOUNTER (OUTPATIENT)
Dept: LAB | Age: 82
End: 2022-04-27
Attending: INTERNAL MEDICINE
Payer: MEDICARE

## 2022-04-27 LAB
ALBUMIN SERPL-MCNC: 3.6 G/DL (ref 3.4–5)
ALBUMIN/GLOB SERPL: 1.3 {RATIO} (ref 1–2)
ALP LIVER SERPL-CCNC: 81 U/L
ALT SERPL-CCNC: 51 U/L
ANION GAP SERPL CALC-SCNC: 3 MMOL/L (ref 0–18)
AST SERPL-CCNC: 28 U/L (ref 15–37)
BILIRUB SERPL-MCNC: 1.6 MG/DL (ref 0.1–2)
BUN BLD-MCNC: 12 MG/DL (ref 7–18)
CALCIUM BLD-MCNC: 8.8 MG/DL (ref 8.5–10.1)
CHLORIDE SERPL-SCNC: 105 MMOL/L (ref 98–112)
CO2 SERPL-SCNC: 28 MMOL/L (ref 21–32)
CREAT BLD-MCNC: 0.76 MG/DL
CRP SERPL-MCNC: <0.29 MG/DL (ref ?–0.3)
DEPRECATED HBV CORE AB SER IA-ACNC: 102.6 NG/ML
FASTING STATUS PATIENT QL REPORTED: YES
GLOBULIN PLAS-MCNC: 2.8 G/DL (ref 2.8–4.4)
GLUCOSE BLD-MCNC: 81 MG/DL (ref 70–99)
IRON SATN MFR SERPL: 29 %
IRON SERPL-MCNC: 95 UG/DL
OSMOLALITY SERPL CALC.SUM OF ELEC: 281 MOSM/KG (ref 275–295)
POTASSIUM SERPL-SCNC: 4.2 MMOL/L (ref 3.5–5.1)
PROT SERPL-MCNC: 6.4 G/DL (ref 6.4–8.2)
SODIUM SERPL-SCNC: 136 MMOL/L (ref 136–145)
TIBC SERPL-MCNC: 332 UG/DL (ref 240–450)
TRANSFERRIN SERPL-MCNC: 223 MG/DL (ref 200–360)

## 2022-04-27 PROCEDURE — 83550 IRON BINDING TEST: CPT | Performed by: INTERNAL MEDICINE

## 2022-04-27 PROCEDURE — 82728 ASSAY OF FERRITIN: CPT | Performed by: INTERNAL MEDICINE

## 2022-04-27 PROCEDURE — 36415 COLL VENOUS BLD VENIPUNCTURE: CPT | Performed by: INTERNAL MEDICINE

## 2022-04-27 PROCEDURE — 80053 COMPREHEN METABOLIC PANEL: CPT | Performed by: INTERNAL MEDICINE

## 2022-04-27 PROCEDURE — 83540 ASSAY OF IRON: CPT | Performed by: INTERNAL MEDICINE

## 2022-04-27 PROCEDURE — 86140 C-REACTIVE PROTEIN: CPT | Performed by: INTERNAL MEDICINE

## 2022-04-29 ENCOUNTER — TELEPHONE (OUTPATIENT)
Dept: INTERNAL MEDICINE CLINIC | Facility: CLINIC | Age: 82
End: 2022-04-29

## 2022-04-29 NOTE — TELEPHONE ENCOUNTER
When speaking with patient to go over lab results patient  asked about an oil that Percilla Clore suggested for itching and rash at last OV--No note in OV notess    Patient is asking to have name of oil sent to MyChart     Please advise

## 2022-05-09 ENCOUNTER — OFFICE VISIT (OUTPATIENT)
Dept: INTERNAL MEDICINE CLINIC | Facility: CLINIC | Age: 82
End: 2022-05-09
Payer: COMMERCIAL

## 2022-05-09 VITALS
OXYGEN SATURATION: 96 % | BODY MASS INDEX: 24.05 KG/M2 | RESPIRATION RATE: 16 BRPM | HEART RATE: 96 BPM | TEMPERATURE: 98 F | HEIGHT: 67.5 IN | DIASTOLIC BLOOD PRESSURE: 82 MMHG | SYSTOLIC BLOOD PRESSURE: 140 MMHG | WEIGHT: 155 LBS

## 2022-05-09 DIAGNOSIS — L85.3 DRY SKIN DERMATITIS: ICD-10-CM

## 2022-05-09 DIAGNOSIS — R60.9 EDEMA, UNSPECIFIED TYPE: ICD-10-CM

## 2022-05-09 DIAGNOSIS — I10 ESSENTIAL HYPERTENSION, BENIGN: Primary | ICD-10-CM

## 2022-05-09 PROBLEM — R74.8 ABNORMAL LIVER ENZYMES: Status: RESOLVED | Noted: 2022-04-18 | Resolved: 2022-05-09

## 2022-05-09 PROCEDURE — 3079F DIAST BP 80-89 MM HG: CPT | Performed by: INTERNAL MEDICINE

## 2022-05-09 PROCEDURE — 3008F BODY MASS INDEX DOCD: CPT | Performed by: INTERNAL MEDICINE

## 2022-05-09 PROCEDURE — 99214 OFFICE O/P EST MOD 30 MIN: CPT | Performed by: INTERNAL MEDICINE

## 2022-05-09 PROCEDURE — 3077F SYST BP >= 140 MM HG: CPT | Performed by: INTERNAL MEDICINE

## 2022-05-09 RX ORDER — ENALAPRIL MALEATE 10 MG/1
20 TABLET ORAL DAILY
Refills: 0 | COMMUNITY
Start: 2022-05-09

## 2022-05-09 RX ORDER — ATENOLOL 50 MG/1
50 TABLET ORAL 2 TIMES DAILY
Refills: 0 | COMMUNITY
Start: 2022-05-09

## 2022-05-09 NOTE — PATIENT INSTRUCTIONS
Patient instructed to discontinue the nifedipine his swelling could also be correlated to the tamsulosin. We will try to manage his blood pressure with atenolol and enalapril. Positive dry skin the patient was advised to stay away from hot showers is much as possible use a lot of moisturizing cream if he still has symptomatology may consider changing his enalapril.

## 2022-05-16 ENCOUNTER — OFFICE VISIT (OUTPATIENT)
Dept: INTERNAL MEDICINE CLINIC | Facility: CLINIC | Age: 82
End: 2022-05-16
Payer: COMMERCIAL

## 2022-05-16 VITALS
OXYGEN SATURATION: 98 % | RESPIRATION RATE: 16 BRPM | HEART RATE: 84 BPM | SYSTOLIC BLOOD PRESSURE: 160 MMHG | WEIGHT: 148.81 LBS | DIASTOLIC BLOOD PRESSURE: 90 MMHG | BODY MASS INDEX: 23.08 KG/M2 | TEMPERATURE: 98 F | HEIGHT: 67.5 IN

## 2022-05-16 DIAGNOSIS — I10 ESSENTIAL HYPERTENSION, BENIGN: ICD-10-CM

## 2022-05-16 PROCEDURE — 3077F SYST BP >= 140 MM HG: CPT | Performed by: INTERNAL MEDICINE

## 2022-05-16 PROCEDURE — 3008F BODY MASS INDEX DOCD: CPT | Performed by: INTERNAL MEDICINE

## 2022-05-16 PROCEDURE — 99213 OFFICE O/P EST LOW 20 MIN: CPT | Performed by: INTERNAL MEDICINE

## 2022-05-16 PROCEDURE — 3080F DIAST BP >= 90 MM HG: CPT | Performed by: INTERNAL MEDICINE

## 2022-05-16 RX ORDER — ENALAPRIL MALEATE 10 MG/1
20 TABLET ORAL 2 TIMES DAILY
Refills: 0 | COMMUNITY
Start: 2022-05-16

## 2022-05-16 RX ORDER — AMLODIPINE BESYLATE 5 MG/1
5 TABLET ORAL DAILY
Qty: 30 TABLET | Refills: 11 | Status: SHIPPED | OUTPATIENT
Start: 2022-05-16 | End: 2023-05-11

## 2022-05-20 ENCOUNTER — OFFICE VISIT (OUTPATIENT)
Dept: INTERNAL MEDICINE CLINIC | Facility: CLINIC | Age: 82
End: 2022-05-20
Payer: COMMERCIAL

## 2022-05-20 VITALS
DIASTOLIC BLOOD PRESSURE: 70 MMHG | OXYGEN SATURATION: 96 % | RESPIRATION RATE: 16 BRPM | HEIGHT: 67.5 IN | SYSTOLIC BLOOD PRESSURE: 128 MMHG | HEART RATE: 68 BPM | WEIGHT: 149 LBS | TEMPERATURE: 98 F | BODY MASS INDEX: 23.11 KG/M2

## 2022-05-20 DIAGNOSIS — I10 ESSENTIAL HYPERTENSION, BENIGN: Primary | ICD-10-CM

## 2022-05-20 PROCEDURE — 3078F DIAST BP <80 MM HG: CPT | Performed by: INTERNAL MEDICINE

## 2022-05-20 PROCEDURE — 3074F SYST BP LT 130 MM HG: CPT | Performed by: INTERNAL MEDICINE

## 2022-05-20 PROCEDURE — 3008F BODY MASS INDEX DOCD: CPT | Performed by: INTERNAL MEDICINE

## 2022-05-20 PROCEDURE — 99213 OFFICE O/P EST LOW 20 MIN: CPT | Performed by: INTERNAL MEDICINE

## 2022-05-20 RX ORDER — HYDROCHLOROTHIAZIDE 12.5 MG/1
TABLET ORAL
Qty: 30 TABLET | Refills: 3 | COMMUNITY
Start: 2022-05-20

## 2022-06-24 ENCOUNTER — OFFICE VISIT (OUTPATIENT)
Dept: INTERNAL MEDICINE CLINIC | Facility: CLINIC | Age: 82
End: 2022-06-24
Payer: COMMERCIAL

## 2022-06-24 VITALS
BODY MASS INDEX: 22.8 KG/M2 | HEIGHT: 67.5 IN | HEART RATE: 72 BPM | OXYGEN SATURATION: 98 % | WEIGHT: 147 LBS | DIASTOLIC BLOOD PRESSURE: 72 MMHG | RESPIRATION RATE: 16 BRPM | TEMPERATURE: 98 F | SYSTOLIC BLOOD PRESSURE: 122 MMHG

## 2022-06-24 DIAGNOSIS — I10 ESSENTIAL HYPERTENSION, BENIGN: ICD-10-CM

## 2022-06-24 PROCEDURE — 3074F SYST BP LT 130 MM HG: CPT | Performed by: INTERNAL MEDICINE

## 2022-06-24 PROCEDURE — 3008F BODY MASS INDEX DOCD: CPT | Performed by: INTERNAL MEDICINE

## 2022-06-24 PROCEDURE — 3078F DIAST BP <80 MM HG: CPT | Performed by: INTERNAL MEDICINE

## 2022-06-24 PROCEDURE — 99213 OFFICE O/P EST LOW 20 MIN: CPT | Performed by: INTERNAL MEDICINE

## 2022-06-24 RX ORDER — ENALAPRIL MALEATE 10 MG/1
30 TABLET ORAL DAILY
Refills: 0 | COMMUNITY
Start: 2022-06-24

## 2022-06-24 RX ORDER — ATENOLOL 50 MG/1
50 TABLET ORAL DAILY
Refills: 0 | COMMUNITY
Start: 2022-06-24

## 2022-06-24 NOTE — PATIENT INSTRUCTIONS
Meds verified with patient apparently has been taking enalapril 30 mg daily and atenolol 50 mg daily

## 2022-07-01 ENCOUNTER — LAB ENCOUNTER (OUTPATIENT)
Dept: LAB | Age: 82
End: 2022-07-01
Attending: INTERNAL MEDICINE
Payer: MEDICARE

## 2022-07-01 DIAGNOSIS — Z00.00 ROUTINE GENERAL MEDICAL EXAMINATION AT A HEALTH CARE FACILITY: Primary | ICD-10-CM

## 2022-07-01 LAB
ALBUMIN SERPL-MCNC: 3.5 G/DL (ref 3.4–5)
ALBUMIN/GLOB SERPL: 1.1 {RATIO} (ref 1–2)
ALP LIVER SERPL-CCNC: 102 U/L
ALT SERPL-CCNC: 19 U/L
ANION GAP SERPL CALC-SCNC: 6 MMOL/L (ref 0–18)
AST SERPL-CCNC: 21 U/L (ref 15–37)
BILIRUB SERPL-MCNC: 1 MG/DL (ref 0.1–2)
BUN BLD-MCNC: 11 MG/DL (ref 7–18)
CALCIUM BLD-MCNC: 8.9 MG/DL (ref 8.5–10.1)
CHLORIDE SERPL-SCNC: 103 MMOL/L (ref 98–112)
CO2 SERPL-SCNC: 26 MMOL/L (ref 21–32)
CREAT BLD-MCNC: 0.76 MG/DL
FASTING STATUS PATIENT QL REPORTED: YES
GLOBULIN PLAS-MCNC: 3.2 G/DL (ref 2.8–4.4)
GLUCOSE BLD-MCNC: 84 MG/DL (ref 70–99)
OSMOLALITY SERPL CALC.SUM OF ELEC: 279 MOSM/KG (ref 275–295)
POTASSIUM SERPL-SCNC: 4.2 MMOL/L (ref 3.5–5.1)
PROT SERPL-MCNC: 6.7 G/DL (ref 6.4–8.2)
SODIUM SERPL-SCNC: 135 MMOL/L (ref 136–145)

## 2022-07-01 PROCEDURE — 80053 COMPREHEN METABOLIC PANEL: CPT | Performed by: INTERNAL MEDICINE

## 2022-07-01 PROCEDURE — 36415 COLL VENOUS BLD VENIPUNCTURE: CPT | Performed by: INTERNAL MEDICINE

## 2022-09-22 DIAGNOSIS — N40.1 BENIGN PROSTATIC HYPERPLASIA WITH POST-VOID DRIBBLING: ICD-10-CM

## 2022-09-22 DIAGNOSIS — N39.43 BENIGN PROSTATIC HYPERPLASIA WITH POST-VOID DRIBBLING: ICD-10-CM

## 2022-09-22 RX ORDER — TAMSULOSIN HYDROCHLORIDE 0.4 MG/1
0.4 CAPSULE ORAL
Qty: 90 CAPSULE | Refills: 0 | Status: SHIPPED | OUTPATIENT
Start: 2022-09-22

## 2022-09-22 NOTE — TELEPHONE ENCOUNTER
Called pt to verify how many capsule of Tamusolin. Pt states he uses 1 capsule daily. bhupendra pt. Filled 1-4-22  Qty 180  0 refills  No upcoming appt.   LOV 6-24-22 VM

## 2022-09-22 NOTE — TELEPHONE ENCOUNTER
Pt requesting refill:    tamsulosin 0.4 MG Oral Cap    [facility] - [address] [phone], [phone]

## 2022-12-27 RX ORDER — TAMSULOSIN HYDROCHLORIDE 0.4 MG/1
0.4 CAPSULE ORAL
Qty: 90 CAPSULE | Refills: 0 | Status: SHIPPED | OUTPATIENT
Start: 2022-12-27

## 2023-02-07 ENCOUNTER — OFFICE VISIT (OUTPATIENT)
Dept: INTERNAL MEDICINE CLINIC | Facility: CLINIC | Age: 83
End: 2023-02-07
Payer: MEDICARE

## 2023-02-07 ENCOUNTER — TELEPHONE (OUTPATIENT)
Dept: INTERNAL MEDICINE CLINIC | Facility: CLINIC | Age: 83
End: 2023-02-07

## 2023-02-07 ENCOUNTER — LAB ENCOUNTER (OUTPATIENT)
Dept: LAB | Age: 83
End: 2023-02-07
Attending: INTERNAL MEDICINE
Payer: MEDICARE

## 2023-02-07 VITALS
HEIGHT: 67.5 IN | TEMPERATURE: 99 F | HEART RATE: 58 BPM | DIASTOLIC BLOOD PRESSURE: 90 MMHG | OXYGEN SATURATION: 100 % | RESPIRATION RATE: 16 BRPM | SYSTOLIC BLOOD PRESSURE: 180 MMHG | WEIGHT: 151 LBS | BODY MASS INDEX: 23.42 KG/M2

## 2023-02-07 DIAGNOSIS — R42 DIZZINESS: ICD-10-CM

## 2023-02-07 DIAGNOSIS — I10 ESSENTIAL HYPERTENSION, BENIGN: Primary | ICD-10-CM

## 2023-02-07 PROBLEM — C73 MALIGNANT NEOPLASM OF THYROID GLAND (HCC): Chronic | Status: ACTIVE | Noted: 2020-09-01

## 2023-02-07 LAB
ANION GAP SERPL CALC-SCNC: 3 MMOL/L (ref 0–18)
ATRIAL RATE: 54 BPM
BASOPHILS # BLD AUTO: 0.04 X10(3) UL (ref 0–0.2)
BASOPHILS NFR BLD AUTO: 0.7 %
BUN BLD-MCNC: 11 MG/DL (ref 7–18)
CALCIUM BLD-MCNC: 9.6 MG/DL (ref 8.5–10.1)
CHLORIDE SERPL-SCNC: 99 MMOL/L (ref 98–112)
CO2 SERPL-SCNC: 30 MMOL/L (ref 21–32)
CREAT BLD-MCNC: 0.82 MG/DL
EOSINOPHIL # BLD AUTO: 0.15 X10(3) UL (ref 0–0.7)
EOSINOPHIL NFR BLD AUTO: 2.5 %
ERYTHROCYTE [DISTWIDTH] IN BLOOD BY AUTOMATED COUNT: 19 %
FASTING STATUS PATIENT QL REPORTED: NO
GFR SERPLBLD BASED ON 1.73 SQ M-ARVRAT: 88 ML/MIN/1.73M2 (ref 60–?)
GLUCOSE BLD-MCNC: 100 MG/DL (ref 70–99)
HCT VFR BLD AUTO: 39.3 %
HGB BLD-MCNC: 12.3 G/DL
IMM GRANULOCYTES # BLD AUTO: 0.01 X10(3) UL (ref 0–1)
IMM GRANULOCYTES NFR BLD: 0.2 %
LYMPHOCYTES # BLD AUTO: 1.33 X10(3) UL (ref 1–4)
LYMPHOCYTES NFR BLD AUTO: 22.1 %
MAGNESIUM SERPL-MCNC: 2.1 MG/DL (ref 1.6–2.6)
MCH RBC QN AUTO: 20.5 PG (ref 26–34)
MCHC RBC AUTO-ENTMCNC: 31.3 G/DL (ref 31–37)
MCV RBC AUTO: 65.6 FL
MONOCYTES # BLD AUTO: 0.86 X10(3) UL (ref 0.1–1)
MONOCYTES NFR BLD AUTO: 14.3 %
NEUTROPHILS # BLD AUTO: 3.64 X10 (3) UL (ref 1.5–7.7)
NEUTROPHILS # BLD AUTO: 3.64 X10(3) UL (ref 1.5–7.7)
NEUTROPHILS NFR BLD AUTO: 60.2 %
OSMOLALITY SERPL CALC.SUM OF ELEC: 273 MOSM/KG (ref 275–295)
PLATELET # BLD AUTO: 175 10(3)UL (ref 150–450)
POTASSIUM SERPL-SCNC: 4.2 MMOL/L (ref 3.5–5.1)
Q-T INTERVAL: 438 MS
QRS DURATION: 88 MS
QTC CALCULATION (BEZET): 426 MS
R AXIS: -43 DEGREES
RBC # BLD AUTO: 5.99 X10(6)UL
SODIUM SERPL-SCNC: 132 MMOL/L (ref 136–145)
T AXIS: 75 DEGREES
TSI SER-ACNC: 0.51 MIU/ML (ref 0.36–3.74)
VENTRICULAR RATE: 57 BPM
WBC # BLD AUTO: 6 X10(3) UL (ref 4–11)

## 2023-02-07 PROCEDURE — 93000 ELECTROCARDIOGRAM COMPLETE: CPT | Performed by: INTERNAL MEDICINE

## 2023-02-07 PROCEDURE — 85025 COMPLETE CBC W/AUTO DIFF WBC: CPT | Performed by: INTERNAL MEDICINE

## 2023-02-07 PROCEDURE — 36415 COLL VENOUS BLD VENIPUNCTURE: CPT | Performed by: INTERNAL MEDICINE

## 2023-02-07 PROCEDURE — 99214 OFFICE O/P EST MOD 30 MIN: CPT | Performed by: INTERNAL MEDICINE

## 2023-02-07 PROCEDURE — 84443 ASSAY THYROID STIM HORMONE: CPT | Performed by: INTERNAL MEDICINE

## 2023-02-07 PROCEDURE — 80048 BASIC METABOLIC PNL TOTAL CA: CPT | Performed by: INTERNAL MEDICINE

## 2023-02-07 PROCEDURE — 83735 ASSAY OF MAGNESIUM: CPT | Performed by: INTERNAL MEDICINE

## 2023-02-07 RX ORDER — NIFEDIPINE 60 MG/1
60 TABLET, EXTENDED RELEASE ORAL DAILY
Qty: 30 TABLET | Refills: 2 | Status: SHIPPED | OUTPATIENT
Start: 2023-02-07 | End: 2023-02-07

## 2023-02-07 RX ORDER — CLONIDINE HYDROCHLORIDE 0.1 MG/1
0.1 TABLET ORAL 2 TIMES DAILY
COMMUNITY
Start: 2023-01-06 | End: 2023-02-07

## 2023-02-07 RX ORDER — CLONIDINE HYDROCHLORIDE 0.1 MG/1
0.1 TABLET ORAL NIGHTLY
Refills: 0 | COMMUNITY
Start: 2023-02-07 | End: 2023-02-09 | Stop reason: DRUGHIGH

## 2023-02-07 RX ORDER — NIFEDIPINE 60 MG/1
60 TABLET, FILM COATED, EXTENDED RELEASE ORAL NIGHTLY
COMMUNITY

## 2023-02-07 RX ORDER — FUROSEMIDE 20 MG/1
20 TABLET ORAL EVERY OTHER DAY
COMMUNITY
Start: 2022-11-01 | End: 2023-02-07

## 2023-02-07 NOTE — TELEPHONE ENCOUNTER
Spoke to pt, he will be here at 2:30 PM for 15 minute evaluation. This is to be instead of the appt scheduled for 2/9/23 @ 1:30 PM.     [facility] office assisting with the scheduling matrix.

## 2023-02-07 NOTE — TELEPHONE ENCOUNTER
- can pt be scheduled with you today at 2:30pm? Do you still want 15 minute appt? Please advise, ty! Triaged patient - He is relaxing today, feeling ok. Yesterday his highest blood pressure was in the 613'D systolic and 95 diastolic. Pt has not taken his BP today. Pt saw cardiologist about one month ago was prescribed clonidine 0.1 mg. Cardiologist is on vacation right now. Pt reports having headaches every day for about 14 days now. Informed the pt I will check with VM to see if he can be seen today and will call pt back. Pt v/u and agreed with this plan.

## 2023-02-07 NOTE — TELEPHONE ENCOUNTER
Apt scheduled.      Future Appointments   Date Time Provider Boubacar Kelleyi   2/7/2023  2:30 PM Zafar Mtz MD [facility] [place]

## 2023-02-07 NOTE — TELEPHONE ENCOUNTER
[facility] \"confusion, light headed, and high BP\" Does pt need to be seen sooner or is he ok to keep current apt? Pt needing triage? Last ofv 06/2022.      Future Appointments   Date Time Provider Boubacar Molina   2/9/2023  1:30 PM Sherwin Castro MD [facility] [place]

## 2023-02-07 NOTE — PATIENT INSTRUCTIONS
pt informed that it was not acceptable that he was taking medicines which were being prescribed by another physician without my knowledge.   Now He seeks help since he cannot reach his other physician who is managing his medicines

## 2023-02-09 ENCOUNTER — OFFICE VISIT (OUTPATIENT)
Dept: INTERNAL MEDICINE CLINIC | Facility: CLINIC | Age: 83
End: 2023-02-09
Payer: MEDICARE

## 2023-02-09 VITALS
HEART RATE: 77 BPM | HEIGHT: 67.5 IN | DIASTOLIC BLOOD PRESSURE: 70 MMHG | RESPIRATION RATE: 16 BRPM | OXYGEN SATURATION: 99 % | WEIGHT: 150.81 LBS | SYSTOLIC BLOOD PRESSURE: 139 MMHG | TEMPERATURE: 98 F | BODY MASS INDEX: 23.4 KG/M2

## 2023-02-09 DIAGNOSIS — Z00.00 LABORATORY EXAMINATION ORDERED AS PART OF A ROUTINE GENERAL MEDICAL EXAMINATION: ICD-10-CM

## 2023-02-09 DIAGNOSIS — I10 ESSENTIAL HYPERTENSION, BENIGN: Primary | ICD-10-CM

## 2023-02-09 PROCEDURE — 99213 OFFICE O/P EST LOW 20 MIN: CPT | Performed by: INTERNAL MEDICINE

## 2023-02-09 NOTE — PATIENT INSTRUCTIONS
Patient advised to follow-up in 4 weeks refrain from getting multi opinion from different doctors to avoid getting confused  All labs discussed with patient

## 2023-02-20 ENCOUNTER — PATIENT MESSAGE (OUTPATIENT)
Dept: INTERNAL MEDICINE CLINIC | Facility: CLINIC | Age: 83
End: 2023-02-20

## 2023-02-21 NOTE — TELEPHONE ENCOUNTER
From: Adalberto Woods  To: Sanaz Higgins MD  Sent: 2/20/2023 1:19 PM CST  Subject: blood pressure    Taking nefedipine 60 mg daily at bed carlos. readinds last two days are 100, 64. 63 and 95, 60, 69  Are there readings loo low and I should reduce the dose?

## 2023-02-23 ENCOUNTER — LAB ENCOUNTER (OUTPATIENT)
Dept: LAB | Age: 83
End: 2023-02-23
Attending: INTERNAL MEDICINE
Payer: MEDICARE

## 2023-02-23 DIAGNOSIS — Z00.00 LABORATORY EXAMINATION ORDERED AS PART OF A ROUTINE GENERAL MEDICAL EXAMINATION: ICD-10-CM

## 2023-02-23 LAB
ALBUMIN SERPL-MCNC: 3.5 G/DL (ref 3.4–5)
ALBUMIN/GLOB SERPL: 1 {RATIO} (ref 1–2)
ALP LIVER SERPL-CCNC: 114 U/L
ALT SERPL-CCNC: 45 U/L
ANION GAP SERPL CALC-SCNC: 5 MMOL/L (ref 0–18)
AST SERPL-CCNC: 39 U/L (ref 15–37)
BILIRUB SERPL-MCNC: 1 MG/DL (ref 0.1–2)
BILIRUB UR QL STRIP.AUTO: NEGATIVE
BUN BLD-MCNC: 10 MG/DL (ref 7–18)
CALCIUM BLD-MCNC: 8.7 MG/DL (ref 8.5–10.1)
CHLORIDE SERPL-SCNC: 101 MMOL/L (ref 98–112)
CHOLEST SERPL-MCNC: 134 MG/DL (ref ?–200)
CLARITY UR REFRACT.AUTO: CLEAR
CO2 SERPL-SCNC: 28 MMOL/L (ref 21–32)
COLOR UR AUTO: YELLOW
COMPLEXED PSA SERPL-MCNC: 1.94 NG/ML (ref ?–4)
CREAT BLD-MCNC: 0.68 MG/DL
EST. AVERAGE GLUCOSE BLD GHB EST-MCNC: 114 MG/DL (ref 68–126)
FASTING PATIENT LIPID ANSWER: YES
FASTING STATUS PATIENT QL REPORTED: YES
GFR SERPLBLD BASED ON 1.73 SQ M-ARVRAT: 93 ML/MIN/1.73M2 (ref 60–?)
GLOBULIN PLAS-MCNC: 3.4 G/DL (ref 2.8–4.4)
GLUCOSE BLD-MCNC: 98 MG/DL (ref 70–99)
GLUCOSE UR STRIP.AUTO-MCNC: NEGATIVE MG/DL
HBA1C MFR BLD: 5.6 % (ref ?–5.7)
HDLC SERPL-MCNC: 58 MG/DL (ref 40–59)
KETONES UR STRIP.AUTO-MCNC: NEGATIVE MG/DL
LDLC SERPL CALC-MCNC: 65 MG/DL (ref ?–100)
LEUKOCYTE ESTERASE UR QL STRIP.AUTO: NEGATIVE
NITRITE UR QL STRIP.AUTO: NEGATIVE
NONHDLC SERPL-MCNC: 76 MG/DL (ref ?–130)
OSMOLALITY SERPL CALC.SUM OF ELEC: 277 MOSM/KG (ref 275–295)
PH UR STRIP.AUTO: 7 [PH] (ref 5–8)
POTASSIUM SERPL-SCNC: 3.6 MMOL/L (ref 3.5–5.1)
PROT SERPL-MCNC: 6.9 G/DL (ref 6.4–8.2)
PROT UR STRIP.AUTO-MCNC: NEGATIVE MG/DL
RBC UR QL AUTO: NEGATIVE
SODIUM SERPL-SCNC: 134 MMOL/L (ref 136–145)
SP GR UR STRIP.AUTO: 1.01 (ref 1–1.03)
T4 SERPL-MCNC: 12.4 UG/DL
TRIGL SERPL-MCNC: 46 MG/DL (ref 30–149)
UROBILINOGEN UR STRIP.AUTO-MCNC: <2 MG/DL
VLDLC SERPL CALC-MCNC: 7 MG/DL (ref 0–30)

## 2023-02-23 PROCEDURE — 80053 COMPREHEN METABOLIC PANEL: CPT | Performed by: INTERNAL MEDICINE

## 2023-02-23 PROCEDURE — 81003 URINALYSIS AUTO W/O SCOPE: CPT | Performed by: INTERNAL MEDICINE

## 2023-02-23 PROCEDURE — 83036 HEMOGLOBIN GLYCOSYLATED A1C: CPT | Performed by: INTERNAL MEDICINE

## 2023-02-23 PROCEDURE — 84436 ASSAY OF TOTAL THYROXINE: CPT | Performed by: INTERNAL MEDICINE

## 2023-02-23 PROCEDURE — 36415 COLL VENOUS BLD VENIPUNCTURE: CPT | Performed by: INTERNAL MEDICINE

## 2023-02-23 PROCEDURE — 80061 LIPID PANEL: CPT | Performed by: INTERNAL MEDICINE

## 2023-03-02 ENCOUNTER — OFFICE VISIT (OUTPATIENT)
Dept: INTERNAL MEDICINE CLINIC | Facility: CLINIC | Age: 83
End: 2023-03-02
Payer: MEDICARE

## 2023-03-02 VITALS
WEIGHT: 147.81 LBS | DIASTOLIC BLOOD PRESSURE: 68 MMHG | RESPIRATION RATE: 16 BRPM | HEART RATE: 76 BPM | TEMPERATURE: 98 F | HEIGHT: 67 IN | BODY MASS INDEX: 23.2 KG/M2 | OXYGEN SATURATION: 97 % | SYSTOLIC BLOOD PRESSURE: 108 MMHG

## 2023-03-02 DIAGNOSIS — E89.0 POSTOPERATIVE HYPOTHYROIDISM: Chronic | ICD-10-CM

## 2023-03-02 DIAGNOSIS — R93.5 ABNORMAL CT OF THE ABDOMEN: ICD-10-CM

## 2023-03-02 DIAGNOSIS — I10 ESSENTIAL HYPERTENSION, BENIGN: Chronic | ICD-10-CM

## 2023-03-02 DIAGNOSIS — M85.89 OSTEOPENIA OF MULTIPLE SITES: Chronic | ICD-10-CM

## 2023-03-02 DIAGNOSIS — Z00.00 ROUTINE GENERAL MEDICAL EXAMINATION AT A HEALTH CARE FACILITY: Primary | ICD-10-CM

## 2023-03-02 PROBLEM — C73 MALIGNANT NEOPLASM OF THYROID GLAND (HCC): Chronic | Status: RESOLVED | Noted: 2020-09-01 | Resolved: 2023-03-02

## 2023-03-02 PROCEDURE — G0439 PPPS, SUBSEQ VISIT: HCPCS | Performed by: INTERNAL MEDICINE

## 2023-03-02 PROCEDURE — 99213 OFFICE O/P EST LOW 20 MIN: CPT | Performed by: INTERNAL MEDICINE

## 2023-03-02 RX ORDER — NIFEDIPINE 30 MG/1
30 TABLET, FILM COATED, EXTENDED RELEASE ORAL NIGHTLY
Qty: 90 TABLET | Refills: 3 | Status: SHIPPED | OUTPATIENT
Start: 2023-03-02 | End: 2023-05-31

## 2023-03-14 ENCOUNTER — HOSPITAL ENCOUNTER (OUTPATIENT)
Dept: BONE DENSITY | Age: 83
Discharge: HOME | End: 2023-03-14
Attending: INTERNAL MEDICINE
Payer: MEDICARE

## 2023-03-14 DIAGNOSIS — M85.89 OSTEOPENIA OF MULTIPLE SITES: Chronic | ICD-10-CM

## 2023-03-14 PROCEDURE — 77080 DXA BONE DENSITY AXIAL: CPT | Performed by: INTERNAL MEDICINE

## 2023-03-17 ENCOUNTER — VIRTUAL PHONE E/M (OUTPATIENT)
Dept: INTERNAL MEDICINE CLINIC | Facility: CLINIC | Age: 83
End: 2023-03-17
Payer: MEDICARE

## 2023-03-17 DIAGNOSIS — M85.80 OSTEOPENIA, UNSPECIFIED LOCATION: Primary | ICD-10-CM

## 2023-03-17 PROCEDURE — 99213 OFFICE O/P EST LOW 20 MIN: CPT | Performed by: INTERNAL MEDICINE

## 2023-03-17 RX ORDER — CALCIUM CARBONATE 500(1250)
500 TABLET ORAL 2 TIMES DAILY
Qty: 30 TABLET | Refills: 0 | COMMUNITY
Start: 2023-03-17

## 2023-03-17 NOTE — PROGRESS NOTES
Virtual Telephone Check-In    Natalia Funk verbally consents to a Virtual/Telephone Check-In visit on 03/17/23. Patient has been referred to the [facility] website at [email or web address] to review the yearly Consent to Treat document. Patient understands and accepts financial responsibility for any deductible, co-insurance and/or co-pays associated with this service.     Duration of the service: 15 minutes      Summary of topics discussed: d/w dexa   Must take calcium and vit d  Cont wt bearing exercises  Answered other questions which pt had -unrelated  to dexa scan       Diagnoses and all orders for this visit:    Osteopenia, unspecified location      meds adjusted     Colton Zarate MD

## 2023-03-25 RX ORDER — TAMSULOSIN HYDROCHLORIDE 0.4 MG/1
CAPSULE ORAL
Qty: 90 CAPSULE | Refills: 0 | Status: SHIPPED | OUTPATIENT
Start: 2023-03-25

## 2023-03-25 NOTE — TELEPHONE ENCOUNTER
No Protocol     Requesting: tamsulosin 0.4mg     LOV: 3/2/23   RTC: 6 months   Filled: 12/27/22 # 90 0 refills   Recent Labs: 2/23/23     Upcoming OV: 9/5/23

## 2023-07-17 ENCOUNTER — OFFICE VISIT (OUTPATIENT)
Dept: INTERNAL MEDICINE CLINIC | Facility: CLINIC | Age: 83
End: 2023-07-17
Payer: MEDICARE

## 2023-07-17 ENCOUNTER — LAB ENCOUNTER (OUTPATIENT)
Dept: LAB | Age: 83
End: 2023-07-17
Attending: INTERNAL MEDICINE
Payer: MEDICARE

## 2023-07-17 VITALS
HEART RATE: 71 BPM | TEMPERATURE: 98 F | DIASTOLIC BLOOD PRESSURE: 68 MMHG | HEIGHT: 67 IN | OXYGEN SATURATION: 100 % | RESPIRATION RATE: 16 BRPM | SYSTOLIC BLOOD PRESSURE: 126 MMHG | BODY MASS INDEX: 23.64 KG/M2 | WEIGHT: 150.63 LBS

## 2023-07-17 DIAGNOSIS — I48.0 PAROXYSMAL ATRIAL FIBRILLATION (HCC): Primary | ICD-10-CM

## 2023-07-17 PROBLEM — R93.5 ABNORMAL CT OF THE ABDOMEN: Status: RESOLVED | Noted: 2022-04-23 | Resolved: 2023-07-17

## 2023-07-17 LAB
ANION GAP SERPL CALC-SCNC: 3 MMOL/L (ref 0–18)
BUN BLD-MCNC: 14 MG/DL (ref 7–18)
CALCIUM BLD-MCNC: 8.9 MG/DL (ref 8.5–10.1)
CHLORIDE SERPL-SCNC: 98 MMOL/L (ref 98–112)
CO2 SERPL-SCNC: 30 MMOL/L (ref 21–32)
CREAT BLD-MCNC: 0.87 MG/DL
FASTING STATUS PATIENT QL REPORTED: YES
GFR SERPLBLD BASED ON 1.73 SQ M-ARVRAT: 86 ML/MIN/1.73M2 (ref 60–?)
GLUCOSE BLD-MCNC: 100 MG/DL (ref 70–99)
MAGNESIUM SERPL-MCNC: 2.1 MG/DL (ref 1.6–2.6)
OSMOLALITY SERPL CALC.SUM OF ELEC: 273 MOSM/KG (ref 275–295)
POTASSIUM SERPL-SCNC: 4.3 MMOL/L (ref 3.5–5.1)
SODIUM SERPL-SCNC: 131 MMOL/L (ref 136–145)

## 2023-07-17 PROCEDURE — 36415 COLL VENOUS BLD VENIPUNCTURE: CPT | Performed by: INTERNAL MEDICINE

## 2023-07-17 PROCEDURE — 83735 ASSAY OF MAGNESIUM: CPT | Performed by: INTERNAL MEDICINE

## 2023-07-17 PROCEDURE — 80048 BASIC METABOLIC PNL TOTAL CA: CPT | Performed by: INTERNAL MEDICINE

## 2023-07-17 RX ORDER — NIFEDIPINE 30 MG/1
30 TABLET, FILM COATED, EXTENDED RELEASE ORAL DAILY
COMMUNITY
Start: 2023-06-23

## 2023-07-17 RX ORDER — APIXABAN 5 MG/1
5 TABLET, FILM COATED ORAL 2 TIMES DAILY
COMMUNITY
Start: 2023-07-14

## 2023-07-17 NOTE — PATIENT INSTRUCTIONS
Wrongly counseled patient that he should try to maintain all doctors within the same system since his cardiologist is in the city who works on a sporadic basis and with a total lack of information is difficult to continue his medical care. We will obtain the records from the cardiologist in the meantime we will try to establish contact with a local cardiac MD.  Multiple other questions about sleep study sleep apnea.   Informed him that all this should be done under the guidance and care of a cardiologist

## 2023-07-20 DIAGNOSIS — E87.1 LOW SODIUM LEVELS: Primary | ICD-10-CM

## 2023-10-13 RX ORDER — TAMSULOSIN HYDROCHLORIDE 0.4 MG/1
0.4 CAPSULE ORAL
Qty: 90 CAPSULE | Refills: 0 | Status: SHIPPED | OUTPATIENT
Start: 2023-10-13 | End: 2024-01-11

## 2023-10-13 NOTE — TELEPHONE ENCOUNTER
Refill request for a one month supply.      TAMSULOSIN 0.4 MG       [id number] - [address], [phone], [facility] [place] [address] Phone: [phone] Fax: [phone]

## 2023-10-13 NOTE — TELEPHONE ENCOUNTER
Requested Prescriptions     Pending Prescriptions Disp Refills    tamsulosin 0.4 MG Oral Cap 90 capsule 0     Sig: Take 1 capsule (0.4 mg total) by mouth After dinner. No protocol     LOV 7/17/23  RTC 4 weeks  Filled 3/25/23 90 caps 0 refills   No future appointments.

## 2023-10-19 ENCOUNTER — TELEPHONE (OUTPATIENT)
Dept: INTERNAL MEDICINE CLINIC | Facility: CLINIC | Age: 83
End: 2023-10-19

## 2023-10-19 NOTE — TELEPHONE ENCOUNTER
Incoming fax from [facility]. Patient is scheduled to have surgery on 12/4/23 for Right Total Knee Arthroplasty.   LVMTCB and schedule pre-op.

## 2023-10-20 NOTE — TELEPHONE ENCOUNTER
Future Appointments   Date Time Provider Department Center   10/30/2023  2:30 PM Neal Cleveland MD [facility] [place]      Requirements placed in pre-op folder in pod #2

## 2023-10-30 ENCOUNTER — OFFICE VISIT (OUTPATIENT)
Dept: INTERNAL MEDICINE CLINIC | Facility: CLINIC | Age: 83
End: 2023-10-30

## 2023-10-30 VITALS
HEIGHT: 67 IN | SYSTOLIC BLOOD PRESSURE: 152 MMHG | OXYGEN SATURATION: 98 % | DIASTOLIC BLOOD PRESSURE: 70 MMHG | RESPIRATION RATE: 16 BRPM | TEMPERATURE: 98 F | WEIGHT: 148.63 LBS | BODY MASS INDEX: 23.33 KG/M2 | HEART RATE: 64 BPM

## 2023-10-30 DIAGNOSIS — Z01.818 PREOPERATIVE EXAMINATION, UNSPECIFIED: Primary | ICD-10-CM

## 2023-10-30 DIAGNOSIS — I48.20 CHRONIC ATRIAL FIBRILLATION (HCC): Chronic | ICD-10-CM

## 2023-10-30 DIAGNOSIS — I10 ESSENTIAL HYPERTENSION, BENIGN: Chronic | ICD-10-CM

## 2023-10-30 PROBLEM — I48.91 ATRIAL FIBRILLATION (HCC): Chronic | Status: ACTIVE | Noted: 2023-10-30

## 2023-10-30 PROBLEM — I48.91 ATRIAL FIBRILLATION (HCC): Status: ACTIVE | Noted: 2023-10-30

## 2023-10-30 PROCEDURE — 3077F SYST BP >= 140 MM HG: CPT | Performed by: INTERNAL MEDICINE

## 2023-10-30 PROCEDURE — 1159F MED LIST DOCD IN RCRD: CPT | Performed by: INTERNAL MEDICINE

## 2023-10-30 PROCEDURE — 3008F BODY MASS INDEX DOCD: CPT | Performed by: INTERNAL MEDICINE

## 2023-10-30 PROCEDURE — 3078F DIAST BP <80 MM HG: CPT | Performed by: INTERNAL MEDICINE

## 2023-10-30 PROCEDURE — 99214 OFFICE O/P EST MOD 30 MIN: CPT | Performed by: INTERNAL MEDICINE

## 2023-11-06 ENCOUNTER — HOSPITAL ENCOUNTER (OUTPATIENT)
Dept: GENERAL RADIOLOGY | Age: 83
Discharge: HOME | End: 2023-11-06
Attending: INTERNAL MEDICINE
Payer: MEDICARE

## 2023-11-06 ENCOUNTER — LAB ENCOUNTER (OUTPATIENT)
Dept: LAB | Age: 83
End: 2023-11-06
Attending: INTERNAL MEDICINE
Payer: MEDICARE

## 2023-11-06 DIAGNOSIS — Z01.818 PREOPERATIVE EXAMINATION, UNSPECIFIED: ICD-10-CM

## 2023-11-06 LAB
ALBUMIN SERPL-MCNC: 4 G/DL (ref 3.4–5)
ALBUMIN/GLOB SERPL: 1.2 {RATIO} (ref 1–2)
ALP LIVER SERPL-CCNC: 106 U/L
ALT SERPL-CCNC: 22 U/L
ANION GAP SERPL CALC-SCNC: 6 MMOL/L (ref 0–18)
AST SERPL-CCNC: 28 U/L (ref 15–37)
BASOPHILS # BLD AUTO: 0.03 X10(3) UL (ref 0–0.2)
BASOPHILS NFR BLD AUTO: 0.5 %
BILIRUB SERPL-MCNC: 1.4 MG/DL (ref 0.1–2)
BILIRUB UR QL STRIP.AUTO: NEGATIVE
BUN BLD-MCNC: 12 MG/DL (ref 9–23)
CALCIUM BLD-MCNC: 8.9 MG/DL (ref 8.5–10.1)
CHLORIDE SERPL-SCNC: 99 MMOL/L (ref 98–112)
CLARITY UR REFRACT.AUTO: CLEAR
CO2 SERPL-SCNC: 26 MMOL/L (ref 21–32)
CREAT BLD-MCNC: 0.74 MG/DL
EGFRCR SERPLBLD CKD-EPI 2021: 90 ML/MIN/1.73M2 (ref 60–?)
EOSINOPHIL # BLD AUTO: 0.11 X10(3) UL (ref 0–0.7)
EOSINOPHIL NFR BLD AUTO: 1.8 %
ERYTHROCYTE [DISTWIDTH] IN BLOOD BY AUTOMATED COUNT: 17.2 %
FASTING STATUS PATIENT QL REPORTED: YES
GLOBULIN PLAS-MCNC: 3.4 G/DL (ref 2.8–4.4)
GLUCOSE BLD-MCNC: 97 MG/DL (ref 70–99)
GLUCOSE UR STRIP.AUTO-MCNC: NORMAL MG/DL
HCT VFR BLD AUTO: 35.1 %
HGB BLD-MCNC: 11.6 G/DL
IMM GRANULOCYTES # BLD AUTO: 0.03 X10(3) UL (ref 0–1)
IMM GRANULOCYTES NFR BLD: 0.5 %
KETONES UR STRIP.AUTO-MCNC: NEGATIVE MG/DL
LEUKOCYTE ESTERASE UR QL STRIP.AUTO: NEGATIVE
LYMPHOCYTES # BLD AUTO: 2.18 X10(3) UL (ref 1–4)
LYMPHOCYTES NFR BLD AUTO: 35.7 %
MCH RBC QN AUTO: 20.8 PG (ref 26–34)
MCHC RBC AUTO-ENTMCNC: 33 G/DL (ref 31–37)
MCV RBC AUTO: 63 FL
MONOCYTES # BLD AUTO: 0.71 X10(3) UL (ref 0.1–1)
MONOCYTES NFR BLD AUTO: 11.6 %
NEUTROPHILS # BLD AUTO: 3.05 X10 (3) UL (ref 1.5–7.7)
NEUTROPHILS # BLD AUTO: 3.05 X10(3) UL (ref 1.5–7.7)
NEUTROPHILS NFR BLD AUTO: 49.9 %
NITRITE UR QL STRIP.AUTO: NEGATIVE
OSMOLALITY SERPL CALC.SUM OF ELEC: 272 MOSM/KG (ref 275–295)
PH UR STRIP.AUTO: 6.5 [PH] (ref 5–8)
PLATELET # BLD AUTO: 202 10(3)UL (ref 150–450)
POTASSIUM SERPL-SCNC: 3.9 MMOL/L (ref 3.5–5.1)
PROT SERPL-MCNC: 7.4 G/DL (ref 6.4–8.2)
PROT UR STRIP.AUTO-MCNC: NEGATIVE MG/DL
RBC # BLD AUTO: 5.57 X10(6)UL
RBC UR QL AUTO: NEGATIVE
SODIUM SERPL-SCNC: 131 MMOL/L (ref 136–145)
SP GR UR STRIP.AUTO: 1.01 (ref 1–1.03)
UROBILINOGEN UR STRIP.AUTO-MCNC: NORMAL MG/DL
WBC # BLD AUTO: 6.1 X10(3) UL (ref 4–11)

## 2023-11-06 PROCEDURE — 71046 X-RAY EXAM CHEST 2 VIEWS: CPT | Performed by: INTERNAL MEDICINE

## 2023-11-06 PROCEDURE — 80053 COMPREHEN METABOLIC PANEL: CPT | Performed by: INTERNAL MEDICINE

## 2023-11-06 PROCEDURE — 81003 URINALYSIS AUTO W/O SCOPE: CPT | Performed by: INTERNAL MEDICINE

## 2023-11-06 PROCEDURE — 36415 COLL VENOUS BLD VENIPUNCTURE: CPT | Performed by: INTERNAL MEDICINE

## 2023-11-06 PROCEDURE — 85025 COMPLETE CBC W/AUTO DIFF WBC: CPT | Performed by: INTERNAL MEDICINE

## 2023-11-07 ENCOUNTER — TELEPHONE (OUTPATIENT)
Dept: INTERNAL MEDICINE CLINIC | Facility: CLINIC | Age: 83
End: 2023-11-07

## 2023-11-07 NOTE — TELEPHONE ENCOUNTER
EKG,LAB, H&P AND NUCLEAR STRESS TEST faxed to [address] and [facility]   Confirmation received   Sent to scan and copy placed in accordion

## 2023-12-11 ENCOUNTER — TELEPHONE (OUTPATIENT)
Dept: INTERNAL MEDICINE CLINIC | Facility: CLINIC | Age: 83
End: 2023-12-11

## 2023-12-11 NOTE — TELEPHONE ENCOUNTER
Spoke to wife, feeling a little bit flustered. Not sure if surgeon is going to want lab orders placed by VM, or have them done at [facility]. RN asked pt to check with surgeon and call back if they do want VM to place the orders. Gave wife office fax # and asked her to have surgeon's office sent report about the surgery so that we can update the pt's chart. Wife says pt will be seeing the surgeon for follow-up on 12/18/23. RN let wife know that VM is out of office until 12/19/23.

## 2023-12-11 NOTE — TELEPHONE ENCOUNTER
Patient's spouse called asking if patient needs to schedule post-op exam following knee surgery on 12/4/23. Please call and advise.

## 2024-01-09 RX ORDER — TAMSULOSIN HYDROCHLORIDE 0.4 MG/1
0.4 CAPSULE ORAL
Qty: 90 CAPSULE | Refills: 0 | Status: SHIPPED | OUTPATIENT
Start: 2024-01-09

## 2024-01-09 RX ORDER — TAMSULOSIN HYDROCHLORIDE 0.4 MG/1
0.4 CAPSULE ORAL
Qty: 90 CAPSULE | Refills: 0 | OUTPATIENT
Start: 2024-01-09 | End: 2024-04-08

## 2024-01-09 NOTE — TELEPHONE ENCOUNTER
No protocol   Tamsulosin 0.4mg     LOV: 10/30/23   RTC: no follow ups on file   Filled: 10/13/23 # 90 0 refills   Labs: 11/6/23   No future appointments.

## 2024-02-20 ENCOUNTER — LAB ENCOUNTER (OUTPATIENT)
Dept: LAB | Age: 84
End: 2024-02-20
Attending: INTERNAL MEDICINE
Payer: MEDICARE

## 2024-02-20 ENCOUNTER — OFFICE VISIT (OUTPATIENT)
Dept: INTERNAL MEDICINE CLINIC | Facility: CLINIC | Age: 84
End: 2024-02-20
Payer: MEDICARE

## 2024-02-20 VITALS
OXYGEN SATURATION: 98 % | RESPIRATION RATE: 16 BRPM | BODY MASS INDEX: 23.07 KG/M2 | TEMPERATURE: 98 F | HEART RATE: 77 BPM | WEIGHT: 147 LBS | HEIGHT: 67 IN | SYSTOLIC BLOOD PRESSURE: 130 MMHG | DIASTOLIC BLOOD PRESSURE: 80 MMHG

## 2024-02-20 DIAGNOSIS — N39.43 BENIGN PROSTATIC HYPERPLASIA WITH POST-VOID DRIBBLING: Chronic | ICD-10-CM

## 2024-02-20 DIAGNOSIS — Z85.850 HISTORY OF THYROID CANCER: Chronic | ICD-10-CM

## 2024-02-20 DIAGNOSIS — I10 ESSENTIAL HYPERTENSION, BENIGN: Chronic | ICD-10-CM

## 2024-02-20 DIAGNOSIS — N40.1 BENIGN PROSTATIC HYPERPLASIA WITH POST-VOID DRIBBLING: Chronic | ICD-10-CM

## 2024-02-20 DIAGNOSIS — I48.0 PAROXYSMAL ATRIAL FIBRILLATION (HCC): Chronic | ICD-10-CM

## 2024-02-20 DIAGNOSIS — Z00.00 ROUTINE GENERAL MEDICAL EXAMINATION AT A HEALTH CARE FACILITY: Primary | ICD-10-CM

## 2024-02-20 DIAGNOSIS — Z00.00 ROUTINE GENERAL MEDICAL EXAMINATION AT A HEALTH CARE FACILITY: ICD-10-CM

## 2024-02-20 DIAGNOSIS — D56.3 THALASSEMIA MINOR: Chronic | ICD-10-CM

## 2024-02-20 DIAGNOSIS — E03.8 OTHER SPECIFIED HYPOTHYROIDISM: Chronic | ICD-10-CM

## 2024-02-20 DIAGNOSIS — R91.8 PULMONARY NODULES: Chronic | ICD-10-CM

## 2024-02-20 DIAGNOSIS — M85.89 OSTEOPENIA OF MULTIPLE SITES: Chronic | ICD-10-CM

## 2024-02-20 LAB
ALBUMIN SERPL-MCNC: 3.8 G/DL (ref 3.4–5)
ALBUMIN/GLOB SERPL: 1.1 {RATIO} (ref 1–2)
ALP LIVER SERPL-CCNC: 103 U/L
ALT SERPL-CCNC: 21 U/L
ANION GAP SERPL CALC-SCNC: 6 MMOL/L (ref 0–18)
AST SERPL-CCNC: 29 U/L (ref 15–37)
BASOPHILS # BLD AUTO: 0.02 X10(3) UL (ref 0–0.2)
BASOPHILS NFR BLD AUTO: 0.4 %
BILIRUB SERPL-MCNC: 1.2 MG/DL (ref 0.1–2)
BILIRUB UR QL STRIP.AUTO: NEGATIVE
BUN BLD-MCNC: 12 MG/DL (ref 9–23)
CALCIUM BLD-MCNC: 9.4 MG/DL (ref 8.5–10.1)
CHLORIDE SERPL-SCNC: 100 MMOL/L (ref 98–112)
CHOLEST SERPL-MCNC: 141 MG/DL (ref ?–200)
CLARITY UR REFRACT.AUTO: CLEAR
CO2 SERPL-SCNC: 26 MMOL/L (ref 21–32)
CREAT BLD-MCNC: 0.64 MG/DL
EGFRCR SERPLBLD CKD-EPI 2021: 95 ML/MIN/1.73M2 (ref 60–?)
EOSINOPHIL # BLD AUTO: 0.16 X10(3) UL (ref 0–0.7)
EOSINOPHIL NFR BLD AUTO: 2.9 %
ERYTHROCYTE [DISTWIDTH] IN BLOOD BY AUTOMATED COUNT: 17.8 %
EST. AVERAGE GLUCOSE BLD GHB EST-MCNC: 111 MG/DL (ref 68–126)
FASTING PATIENT LIPID ANSWER: NO
FASTING STATUS PATIENT QL REPORTED: NO
GLOBULIN PLAS-MCNC: 3.6 G/DL (ref 2.8–4.4)
GLUCOSE BLD-MCNC: 103 MG/DL (ref 70–99)
GLUCOSE UR STRIP.AUTO-MCNC: NORMAL MG/DL
HBA1C MFR BLD: 5.5 % (ref ?–5.7)
HCT VFR BLD AUTO: 35.4 %
HDLC SERPL-MCNC: 63 MG/DL (ref 40–59)
HGB BLD-MCNC: 11.1 G/DL
IMM GRANULOCYTES # BLD AUTO: 0.02 X10(3) UL (ref 0–1)
IMM GRANULOCYTES NFR BLD: 0.4 %
KETONES UR STRIP.AUTO-MCNC: NEGATIVE MG/DL
LDLC SERPL CALC-MCNC: 68 MG/DL (ref ?–100)
LEUKOCYTE ESTERASE UR QL STRIP.AUTO: NEGATIVE
LYMPHOCYTES # BLD AUTO: 0.91 X10(3) UL (ref 1–4)
LYMPHOCYTES NFR BLD AUTO: 16.3 %
MCH RBC QN AUTO: 20.4 PG (ref 26–34)
MCHC RBC AUTO-ENTMCNC: 31.4 G/DL (ref 31–37)
MCV RBC AUTO: 65.1 FL
MONOCYTES # BLD AUTO: 0.55 X10(3) UL (ref 0.1–1)
MONOCYTES NFR BLD AUTO: 9.9 %
NEUTROPHILS # BLD AUTO: 3.91 X10 (3) UL (ref 1.5–7.7)
NEUTROPHILS # BLD AUTO: 3.91 X10(3) UL (ref 1.5–7.7)
NEUTROPHILS NFR BLD AUTO: 70.1 %
NITRITE UR QL STRIP.AUTO: NEGATIVE
NONHDLC SERPL-MCNC: 78 MG/DL (ref ?–130)
OSMOLALITY SERPL CALC.SUM OF ELEC: 274 MOSM/KG (ref 275–295)
PH UR STRIP.AUTO: 7 [PH] (ref 5–8)
PLATELET # BLD AUTO: 212 10(3)UL (ref 150–450)
POTASSIUM SERPL-SCNC: 3.7 MMOL/L (ref 3.5–5.1)
PROT SERPL-MCNC: 7.4 G/DL (ref 6.4–8.2)
PROT UR STRIP.AUTO-MCNC: NEGATIVE MG/DL
PSA SERPL-MCNC: 1.69 NG/ML (ref ?–4)
RBC # BLD AUTO: 5.44 X10(6)UL
RBC UR QL AUTO: NEGATIVE
SODIUM SERPL-SCNC: 132 MMOL/L (ref 136–145)
SP GR UR STRIP.AUTO: 1.01 (ref 1–1.03)
TRIGL SERPL-MCNC: 45 MG/DL (ref 30–149)
UROBILINOGEN UR STRIP.AUTO-MCNC: NORMAL MG/DL
VIT D+METAB SERPL-MCNC: 37.4 NG/ML (ref 30–100)
VLDLC SERPL CALC-MCNC: 7 MG/DL (ref 0–30)
WBC # BLD AUTO: 5.6 X10(3) UL (ref 4–11)

## 2024-02-20 PROCEDURE — 80061 LIPID PANEL: CPT | Performed by: INTERNAL MEDICINE

## 2024-02-20 PROCEDURE — 85025 COMPLETE CBC W/AUTO DIFF WBC: CPT | Performed by: INTERNAL MEDICINE

## 2024-02-20 PROCEDURE — 3075F SYST BP GE 130 - 139MM HG: CPT | Performed by: INTERNAL MEDICINE

## 2024-02-20 PROCEDURE — G0439 PPPS, SUBSEQ VISIT: HCPCS | Performed by: INTERNAL MEDICINE

## 2024-02-20 PROCEDURE — 1159F MED LIST DOCD IN RCRD: CPT | Performed by: INTERNAL MEDICINE

## 2024-02-20 PROCEDURE — 82306 VITAMIN D 25 HYDROXY: CPT

## 2024-02-20 PROCEDURE — 1170F FXNL STATUS ASSESSED: CPT | Performed by: INTERNAL MEDICINE

## 2024-02-20 PROCEDURE — 96160 PT-FOCUSED HLTH RISK ASSMT: CPT | Performed by: INTERNAL MEDICINE

## 2024-02-20 PROCEDURE — 81003 URINALYSIS AUTO W/O SCOPE: CPT | Performed by: INTERNAL MEDICINE

## 2024-02-20 PROCEDURE — 80053 COMPREHEN METABOLIC PANEL: CPT | Performed by: INTERNAL MEDICINE

## 2024-02-20 PROCEDURE — 1160F RVW MEDS BY RX/DR IN RCRD: CPT | Performed by: INTERNAL MEDICINE

## 2024-02-20 PROCEDURE — 3008F BODY MASS INDEX DOCD: CPT | Performed by: INTERNAL MEDICINE

## 2024-02-20 PROCEDURE — 36415 COLL VENOUS BLD VENIPUNCTURE: CPT | Performed by: INTERNAL MEDICINE

## 2024-02-20 PROCEDURE — 3079F DIAST BP 80-89 MM HG: CPT | Performed by: INTERNAL MEDICINE

## 2024-02-20 PROCEDURE — 84153 ASSAY OF PSA TOTAL: CPT | Performed by: INTERNAL MEDICINE

## 2024-02-20 PROCEDURE — 83036 HEMOGLOBIN GLYCOSYLATED A1C: CPT | Performed by: INTERNAL MEDICINE

## 2024-02-20 RX ORDER — OXYCODONE HYDROCHLORIDE 5 MG/1
5 TABLET ORAL EVERY 4 HOURS PRN
COMMUNITY
End: 2024-02-20 | Stop reason: ALTCHOICE

## 2024-02-20 RX ORDER — TAMSULOSIN HYDROCHLORIDE 0.4 MG/1
0.4 CAPSULE ORAL
Qty: 90 CAPSULE | Refills: 0 | Status: SHIPPED | OUTPATIENT
Start: 2024-02-20

## 2024-02-20 RX ORDER — TAMSULOSIN HYDROCHLORIDE 0.4 MG/1
0.4 CAPSULE ORAL
Qty: 90 CAPSULE | Refills: 0 | Status: SHIPPED | OUTPATIENT
Start: 2024-02-20 | End: 2024-02-20

## 2024-02-20 NOTE — PROGRESS NOTES
REASON FOR VISIT:    Cecil Aguayo is a 83 year old male who presents for a MA Supervisit.    male     Patient Care Team: Patient Care Team:  Neal Cleveland MD as PCP - General (Internal Medicine)  You Marin MD as Consulting Physician (Internal Medicine)  Khurram iL MD (GASTROENTEROLOGY)  Ghanshyam Germain MD (Cardiovascular Diseases)  Davide Fontanez MD (OPHTHALMOLOGY)    Patient Active Problem List   Diagnosis    Essential hypertension, benign    Hypothyroid    Pulmonary nodules    History of thyroid cancer    Osteopenia of multiple sites    Thalassemia minor    Benign prostatic hyperplasia with post-void dribbling    Atrial fibrillation (HCC)     Current Outpatient Medications   Medication Sig Dispense Refill    tamsulosin 0.4 MG Oral Cap Take 1 capsule (0.4 mg total) by mouth After dinner. 90 capsule 0    ELIQUIS 5 MG Oral Tab Take 1 tablet (5 mg total) by mouth 2 (two) times daily.      Calcium 500 MG Oral Tab Take 500 mg by mouth in the morning and 500 mg before bedtime. 30 tablet 0    enalapril 10 MG Oral Tab Take 1 tablet (10 mg total) by mouth 2 (two) times daily.  0    atenolol 50 MG Oral Tab Take 1 tablet (50 mg total) by mouth every morning.  0    hydroCHLOROthiazide 25 MG Oral Tab Take 1 tablet (25 mg total) by mouth every other day. 30 tablet 3    OMEPRAZOLE 20 MG Oral Capsule Delayed Release TAKE 1 CAPSULE EVERY DAY (Patient taking differently: as needed.) 90 capsule 0    SYNTHROID 112 MCG Oral Tab Take 1 tablet (112 mcg total) by mouth daily. 90 tablet 3    Multiple Vitamins-Minerals (MULTI-VITAMIN/MINERALS) Oral Tab Take 1 tablet by mouth daily.      Cholecalciferol (VITAMIN D) 2000 UNITS Oral Cap Take 1 capsule (2,000 Units total) by mouth daily.             Latest Ref Rng & Units 11/6/2023     9:06 AM 7/17/2023     3:04 PM 2/23/2023     7:45 AM 2/7/2023     3:29 PM 7/1/2022     8:56 AM 4/27/2022     9:14 AM 4/18/2022     3:18 PM   Glucose and HbA1c   Glucose 70 - 99 mg/dL 97  100  98  100   84  81  119          Latest Ref Rng & Units 2/23/2023     7:45 AM 8/12/2020     6:48 AM 7/30/2019    10:28 AM 2/1/2018     9:10 AM 12/12/2016    10:19 AM 10/27/2015     8:21 AM 10/14/2014     7:39 AM   Cholesterol   Total Cholesterol <200 mg/dL 134  138  145  145  147  144  145    Triglycerides 30 - 149 mg/dL 46  71  62  84  74  69  72    HDL 40 - 59 mg/dL 58  51  47  39  45  40  46    LDL <100 mg/dL 65  72  84  89  87  90  85          Latest Ref Rng & Units 11/6/2023     9:06 AM 7/17/2023     3:04 PM 2/23/2023     7:45 AM 2/7/2023     3:29 PM 7/1/2022     8:56 AM 4/27/2022     9:14 AM 4/18/2022     3:18 PM   BUN and Cr   BUN 9 - 23 mg/dL 12  14  10  11  11  12  6    Creatinine 0.70 - 1.30 mg/dL 0.74  0.87  0.68  0.82  0.76  0.76  0.64          Latest Ref Rng & Units 11/6/2023     9:06 AM 2/23/2023     7:45 AM 7/1/2022     8:56 AM 4/27/2022     9:14 AM 4/18/2022     3:18 PM 4/13/2022     1:21 PM 8/12/2020     6:48 AM   AST and ALT   AST (SGOT) 15 - 37 U/L 28  39  21  28  90  344  23    ALT (SGPT) 16 - 61 U/L 22  45  19  51  178  357  13          Latest Ref Rng & Units 2/7/2023     3:29 PM 9/16/2021    11:27 AM 8/12/2020     6:48 AM 7/30/2019    10:28 AM 6/5/2019     7:44 AM 1/22/2019    10:52 AM 10/26/2018     9:44 AM   TSH and Free T4   TSH 0.358 - 3.740 mIU/mL 0.514  0.438  1.18  0.43  0.223  0.538  0.364    Free T4 0.93 - 1.70 ng/dL  2.00    1.74  1.98  1.89          Latest Ref Rng & Units 2/1/2018     9:10 AM   [facility] REVIEW FLOWSHEET PSA   PSA 0.01 - 4.00 ng/mL 1.57        General Health            Functional Ability           Functional Status            Fall/Risk Assessment                 Depression Screening (PHQ-2/PHQ-9): Over the LAST 2 WEEKS            Advance Directives           Cognitive Assessment               PREVENTATIVE SERVICES   INDICATIONS AND SCHEDULE Internal Lab or Procedure   Diabetes Screening     HbgA1C   Annually HEMOGLOBIN A1c (% of total Hgb)   Date Value   07/30/2019 5.1      HgbA1C (%)   Date Value   02/23/2023 5.6       Fasting Blood Sugar (FSB)Annually Glucose (mg/dL)   Date Value   11/06/2023 97   06/15/2016 84     GLUCOSE (mg/dL)   Date Value   09/21/2020 88      Cardiovascular Disease Screening    LDL Annually LDL Cholesterol (mg/dL)   Date Value   02/23/2023 65     LDL-CHOLESTEROL (mg/dL (calc))   Date Value   08/12/2020 72       EKG - w/ Initial Preventative Physical Exam only, or if medically necessary Dr BELL   Colorectal Cancer Screening     Colonoscopy Screen every 10 years No recommendations at this time    Flex Sigmoidoscopy Screen every 5 years No results found for this or any previous visit.    Fecal Occult Blood Annually No results found for: \"FOB\", \"OCCULTSTOOL\"   Glaucoma Screening     Ophthalmology Visit Annually Dr MELLISSA Fontanez    Immunizations     Zoster (Not covered by Medicare Part B) No orders found for this or any previous visit.     SPECIFIC DISEASE MONITORING Internal Lab or Procedure     Annual Monitoring of Persistent Medications  (ACE/ARB, Digoxin, Diuretics)        Potassium  Annually Potassium (mmol/L)   Date Value   11/06/2023 3.9   06/15/2016 4.3     POTASSIUM (mmol/L)   Date Value   09/21/2020 3.9       Creatinine  Annually CREATININE (mg/dL)   Date Value   09/21/2020 0.75     Creatinine, Serum (mg/dL)   Date Value   06/15/2016 0.71 (L)     Creatinine (mg/dL)   Date Value   11/06/2023 0.74       Digoxin Serum Conc  Annually No results found for: \"DIGOXIN\"          ALLERGIES:     Allergies   Allergen Reactions    Bactrim [Sulfamethoxazole W/Trimethoprim] RASH and FEVER     MEDICAL INFORMATION:   Past Medical History:   Diagnosis Date    Arthritis 5/6 years ago    knee artorits    Atherosclerosis of aorta (HCC) 7/10/2019    Ct chest 7/2011    CANCER     Diverticulosis     DJD (degenerative joint disease)     Dr. Schafer    Lumbar radiculopathy 9/16/2011    Malignant neoplasm of thyroid gland (HCC) 1/29/2014    Osteoarthrosis, unspecified whether  generalized or localized, unspecified site     Osteopenia of thigh 4/7/2017    Osteopenia of thigh, unspecified laterality 04/07/2017    Prostate hypertrophy 2/8/2016    Thalassemia minor     Thyroid cancer (HCC)     Dr. Marin at [facility]    Unspecified essential hypertension     Varicocele       Past Surgical History:   Procedure Laterality Date    CATARACT  2009    bilateral eyes     CHOLECYSTECTOMY      INJECTION, ANESTHETIC/STEROID, TRANSFORAMINAL EPIDURAL; LUMBAR/SACRAL, ADD'L LEVEL  6/21/2013    Procedure: TRANSFORAMINAL EPIDURAL - LUMBAR;  Surgeon: Torres Cruz MD;  Location: [facility]    INJECTION, ANESTHETIC/STEROID, TRANSFORAMINAL EPIDURAL; LUMBAR/SACRAL, ADD'L LEVEL  8/15/2013    Procedure: TRANSFORAMINAL EPIDURAL - LUMBAR;  Surgeon: Torres Cruz MD;  Location: [facility]    INJECTION, ANESTHETIC/STEROID, TRANSFORAMINAL EPIDURAL; LUMBAR/SACRAL, SINGLE LEVEL  5/24/2013    Procedure: TRANSFORAMINAL EPIDURAL - LUMBAR;  Surgeon: Torres Cruz MD;  Location: [facility]    INJECTION, ANESTHETIC/STEROID, TRANSFORAMINAL EPIDURAL; LUMBAR/SACRAL, SINGLE LEVEL  6/21/2013    Procedure: TRANSFORAMINAL EPIDURAL - LUMBAR;  Surgeon: Torres Cruz MD;  Location: [facility]    INJECTION, ANESTHETIC/STEROID, TRANSFORAMINAL EPIDURAL; LUMBAR/SACRAL, SINGLE LEVEL  8/15/2013    Procedure: TRANSFORAMINAL EPIDURAL - LUMBAR;  Surgeon: Torres Cruz MD;  Location: [facility]    OTHER SURGICAL HISTORY      Thyroid Cancer     OTHER SURGICAL HISTORY  2000    UNSPECIFIED SURGERY     OTHER SURGICAL HISTORY  2005    Left salivary gland, removal of cancer      THYROIDECTOMY  2008    papillary cancer       Family History   Problem Relation Age of Onset    Heart Disorder Father     Heart Attack Father     Hypertension Father     Hypertension Mother     Other (Other) Mother         unspecified CVA    Stroke Mother      Hypertension Other         unspecified sibling     Heart Disease Other         siblings     Hypertension Brother     Stroke Brother      Immunization History      Immunization History  Administered            Date(s) Administered    Covid-19 Vaccine Pfizer 30 mcg/0.3 ml                          02/12/2021  03/05/2021  10/05/2021      Covid-19 Vaccine Pfizer Bivalent 30mcg/0.3mL                          11/10/2022  06/30/2023      Covid-19 Vaccine Pfizer Allan-Sucrose 30 mcg/0.3 ml                          06/02/2022      FLU VAC High Dose 65 YRS & Older PRSV Free (93062)                          01/19/2016  01/10/2020  11/02/2022      FLUAD High Dose 65 yr and older (08416)                          12/07/2018 11/09/2020 12/29/2021      Fluvirin, 3 Years & >, Im                          12/23/2014      Fluzone Vaccine Medicare ()                          01/18/2016  12/08/2016  11/02/2017                            01/10/2020      HIGH DOSE FLU 65 YRS AND OLDER PRSV FREE SINGLE D (66732) FLU CLINIC                          02/14/2024      Influenza             02/12/2014  12/09/2016  10/15/2022      Pfizer Covid-19 Vaccine 30mcg/0.3ml 12yrs+ (7434-4690)                          02/14/2024      Pneumococcal (Prevnar 13)                          12/09/2016      Pneumococcal Conjugate PCV20                          07/14/2023      Pneumococcal Vaccine, Conjugate                          10/01/2022      Pneumovax 23          01/25/2018      Zoster Vaccine Live (Zostavax)                          11/06/2017      Zoster Vaccine Recombinant Adjuvanted (Shingrix)                          07/14/2023        SOCIAL HISTORY:   Social History     Socioeconomic History    Marital status:    Tobacco Use    Smoking status: Never    Smokeless tobacco: Never   Vaping Use    Vaping Use: Never used   Substance and Sexual Activity    Alcohol use: No    Drug use: No   Other Topics Concern    Caffeine Concern No    Exercise No     Seat Belt No    Special Diet No    Stress Concern No    Weight Concern No        REVIEW OF SYSTEMS:   General/Constitutional:   General able to do usual activities, good exercise tolerance, good general state of health, --after his knee surgery the patient complains of fatigue  no fever, no weakness .   HEENT/Neck:   Head no dizziness, no lightheadedness. Eyes normal vision, no redness , no drainage. Sees Dr MELLISSA Fontanez  Ears no earaches, no fullness,  +decreased  hearing, no tinnitus. Nose and Sinuses no recurrent colds, no stuffiness, no discharge, no hay fever, no nosebleeds, no sinus trouble. Mouth and Pharynx no sore throats, no hoarseness. Neck no lumps, no goiter, no neck stiffness or pain.   Endocrine:   Diabetes none. Thyroid disorder hypothyroid on replacement meds-s/p thyroidectomy. -Dr Titus  @[facility] and Dr. Garcia also treats him  osteopenia   Respiratory:   Patient denies chest pain, cough, BARROW (dyspnea on exertion),wheezing. Breathing normal pattern . Chest congestion none.  Sees Dr. Marin at [facility] for pulmonary nodules -has f/u ct scheduled   Cardiovascular:   Patient denies chest pain, rheumatic fever,heart murmur.no hx of elevated cholesterol. Leg edema none. Orthopnea no. Palpitations none. PND (paroxsymal nocturnal dyspnea) none.  Sees Dr. Germian  for his cardiac status-last visit 6 months   Gastrointestinal:   Patient denies abdominal pain, acid reflux, blood in stool, vomiting, nausea, heartburn denies any wt loss or gain no change in appetite noted no change in bowel movement noted. Dysphagia none.  Sees Dr. Li for his GI issues   Hematology:   Patient denies abnormal bleeding, easy bruising. Enlarged lymph nodes none.   Men Only:   Patient denies difficulty with erection, penile discharge, testicular pain or swelling, urgency/frequency.   Genitourinary:   Patient denies blood in urine, burning on urination, difficulty urinating, dysuria, , urinary incontinence/no history of  kidney disease or genital abnormalities. Dysuria none. Nocturia noted frequency noted.  Did see the urologist Dr. Zaki Brasher   Musculoskeletal:   Patient denies arthritis , muscle weakness --HX of LBP occ. Joint pain right -.knee replacement  Joint stiffness none.   Peripheral Vascular:   General no varicosities, no claudication.   Dermatologic:   Rash none.  Has a small bump on the right buttock longstanding at times painful  Neurologic:   Patient denies dizziness, fainting, headache, loss of consciousness, memory loss, seizures, paresthesias, weakness. Tingling/numbness none. Trouble with balance none.   Psychiatric:   Patient denies anxiety, depression, hallucinations. Insomnia none/no hx of sleep disorder. Memory loss none.       EXAM:   /80 (BP Location: Right arm, Patient Position: Sitting, Cuff Size: adult)   Pulse 77   Temp 97.6 °F (36.4 °C) (Temporal)   Resp 16   Ht 5' 7\" (1.702 m)   Wt 147 lb (66.7 kg)   SpO2 98%   BMI 23.02 kg/m²    >   BP Readings from Last 3 Encounters:   02/20/24 130/80   10/30/23 152/70   07/17/23 126/68       NECK:   Carotid bruit: none.   Cervical lymph nodes: unremarkable.   JVD: none.   Range of Motion: normal.   Thyroid: unremarkable.  No obvious nodule noted  HEART:   Clicks: no.   Edema: trace  Heart sounds: normal.   Murmurs: none.   Rhythm: Irregular  LUNGS:   Auscultation: clear .   Chest Shape: normal .   Percussion: normal.   Rales: no .   Respiratory effort: normal .   Rhonchi: no.   Wheezes: no.   ABDOMEN:   Bowel sounds: normal.   General: normal.   Hernia: absent.   Liver, Spleen: no hepatosplenomegaly (HSM).   Tenderness: absent .   GENITOURINARY - MALE:   External genitals: unremarkable-small varicoele.   ADONIS: normal .   Penis: unremarkable.   Prostate:deferred  Testicles: unremarkable.   EXTREMITIES:   Clubbing: none.   Cyanosis: absent .   Edema: trace  Pulses: present.   Tremors: no.   Varicose veins: absent .   MUSCULOSKELETAL:   Cervical spines:  normal.   L-S spines: normal.   Lower extremity joints: OA left knee.  Status post right knee joint replacement  Upper extremity joints: normal.   NEUROLOGICAL:   Babinski: negative..   Cerebellar Testing grossly/intact: yes..   Cranial Nerves: CN's II-XII grossly intact.   Gait: normal.   Motor: Power,tone,co-ordination normalInvoluntary movements and wasting none.   Reflexes: normal.   Sensory: all sensory modalities normal.   LYMPHATICS:   Cervical: none.   Groin: no adenopathy .   Inguinal: no adenopathy.   Supraclavicular: none.   DERMATOLOGY:   Rash: no. Nape of neck dormant small seb cysts also has any dermal cyst in the right buttock     ASSESSMENT AND OTHER RELEVANT CHRONIC CONDITIONS:   Cecil Aguayo is a 83 year old male who presents for a Medicare Assessment.     PLAN SUMMARY:   Diagnoses and all orders for this visit:    Routine general medical examination at a health care facility  -     Hemoglobin A1C  -     Lipid Panel  -     CBC With Differential With Platelet  -     Urinalysis, Routine  -     PSA Total, Diagnostic  -     Vitamin D; Future    Essential hypertension, benign stable  -     Comp Metabolic Panel (14)    History of thyroid cancer managed by endocrine    Other specified hypothyroidism managed by endocrine    Paroxysmal atrial fibrillation (HCC) managed by cardiology    Benign prostatic hyperplasia with post-void dribbling managed by urology    Osteopenia of multiple sites managed by endocrine    Thalassemia minor stable    Pulmonary nodules stable    Other orders  -     tamsulosin 0.4 MG Oral Cap; Take 1 capsule (0.4 mg total) by mouth After dinner.       The patient indicates understanding of these issues and agrees to the plan.  The patient is asked to return in 2 days  for f/u   Diet counseling perfomed  Procardia was discontinued in view of his pedal edema.  Patient is being brought back on Thursday because he is leaving for [facility] on a 6-week extended vacation.  SUGGESTED VACCINATIONS  - Influenza, Pneumococcal, Zoster, Tetanus   Influenza: No recommendations at this time  Pneumonia: No recommendations at this time

## 2024-02-22 ENCOUNTER — OFFICE VISIT (OUTPATIENT)
Dept: INTERNAL MEDICINE CLINIC | Facility: CLINIC | Age: 84
End: 2024-02-22
Payer: MEDICARE

## 2024-02-22 VITALS
WEIGHT: 147 LBS | BODY MASS INDEX: 23.07 KG/M2 | RESPIRATION RATE: 16 BRPM | HEIGHT: 67 IN | SYSTOLIC BLOOD PRESSURE: 160 MMHG | HEART RATE: 85 BPM | OXYGEN SATURATION: 99 % | DIASTOLIC BLOOD PRESSURE: 78 MMHG

## 2024-02-22 DIAGNOSIS — I10 ESSENTIAL HYPERTENSION, BENIGN: Chronic | ICD-10-CM

## 2024-02-22 PROCEDURE — 99213 OFFICE O/P EST LOW 20 MIN: CPT | Performed by: INTERNAL MEDICINE

## 2024-02-22 PROCEDURE — 3077F SYST BP >= 140 MM HG: CPT | Performed by: INTERNAL MEDICINE

## 2024-02-22 PROCEDURE — 1159F MED LIST DOCD IN RCRD: CPT | Performed by: INTERNAL MEDICINE

## 2024-02-22 PROCEDURE — 3008F BODY MASS INDEX DOCD: CPT | Performed by: INTERNAL MEDICINE

## 2024-02-22 PROCEDURE — 3078F DIAST BP <80 MM HG: CPT | Performed by: INTERNAL MEDICINE

## 2024-02-22 PROCEDURE — 1160F RVW MEDS BY RX/DR IN RCRD: CPT | Performed by: INTERNAL MEDICINE

## 2024-02-22 RX ORDER — NIFEDIPINE 30 MG
30 TABLET, EXTENDED RELEASE ORAL NIGHTLY
COMMUNITY
Start: 2024-02-22

## 2024-02-22 NOTE — PATIENT INSTRUCTIONS
Patient advised that he could get pedal edema may need to keep his leg elevated cut down his salt intake

## 2024-02-22 NOTE — PROGRESS NOTES
Cecil DAVIS 1940 is a 83 year old male.    Chief Complaint   Patient presents with    Follow - Up        HPI:   BP check feels much better dizziness resolved  Current Outpatient Medications   Medication Sig Dispense Refill    NIFEdipine ER 30 MG Oral Tablet 24 Hr Take 1 tablet (30 mg total) by mouth at bedtime.      tamsulosin 0.4 MG Oral Cap Take 1 capsule (0.4 mg total) by mouth After dinner. 90 capsule 0    ELIQUIS 5 MG Oral Tab Take 1 tablet (5 mg total) by mouth 2 (two) times daily.      Calcium 500 MG Oral Tab Take 500 mg by mouth in the morning and 500 mg before bedtime. 30 tablet 0    enalapril 10 MG Oral Tab Take 1 tablet (10 mg total) by mouth 2 (two) times daily.  0    atenolol 50 MG Oral Tab Take 1 tablet (50 mg total) by mouth every morning.  0    hydroCHLOROthiazide 25 MG Oral Tab Take 1 tablet (25 mg total) by mouth every other day. 30 tablet 3    OMEPRAZOLE 20 MG Oral Capsule Delayed Release TAKE 1 CAPSULE EVERY DAY (Patient taking differently: as needed.) 90 capsule 0    SYNTHROID 112 MCG Oral Tab Take 1 tablet (112 mcg total) by mouth daily. 90 tablet 3    Multiple Vitamins-Minerals (MULTI-VITAMIN/MINERALS) Oral Tab Take 1 tablet by mouth daily.      Cholecalciferol (VITAMIN D) 2000 UNITS Oral Cap Take 1 capsule (2,000 Units total) by mouth daily.        Past Medical History:   Diagnosis Date    Arthritis 5/6 years ago    knee artorits    Atherosclerosis of aorta (HCC) 7/10/2019    Ct chest 2011    CANCER     Diverticulosis     DJD (degenerative joint disease)     Dr. Schafer    Lumbar radiculopathy 2011    Malignant neoplasm of thyroid gland (HCC) 2014    Osteoarthrosis, unspecified whether generalized or localized, unspecified site     Osteopenia of thigh 2017    Osteopenia of thigh, unspecified laterality 2017    Prostate hypertrophy 2016    Thalassemia minor     Thyroid cancer ([facility])     Dr. Marin at [facility]    Unspecified essential hypertension     Varicocele        Social History:  Social History     Socioeconomic History    Marital status:    Tobacco Use    Smoking status: Never    Smokeless tobacco: Never   Vaping Use    Vaping Use: Never used   Substance and Sexual Activity    Alcohol use: No    Drug use: No   Other Topics Concern    Caffeine Concern No    Exercise No    Seat Belt No    Special Diet No    Stress Concern No    Weight Concern No        REVIEW OF SYSTEMS:   Cardiovascular:   Syncope none. Rapid heart beat at rest no. Change in exercise tolerance no. Chest pain no. Chest pain while awake none. Cold extremities no. Dizziness no. Dyspnea on exertion none. Fainting none. Fatigue no. High blood pressure on medication(s). Irregular heart beat no. Leg edema no. Murmurs no. Orthopnea no.      EXAM:   /78   Pulse 85   Resp 16   Ht 5' 7\" (1.702 m)   Wt 147 lb (66.7 kg)   SpO2 99%   BMI 23.02 kg/m²   HEENT:   jvp not raised.   Ear canals: normal.   Ear drums: normal .   Ears: unremarkable.   Mouth: unremarkable.   Nasal septum: midline.   Pharynx: normal.   Sinuses: non-tender.   HEART:   Clicks: no.   Distal Pulses Palpable: yes.   Edema: none visible .   Gallop: no .   Heart sounds: normal S1S2.   Murmurs: none.   Rhythm: regular.   LUNGS:   Airflow: normal air movement.   Auscultation: no wheezing/rhonchi/rales.   Breath sounds bilaterally: symmetrical.       ASSESSMENT AND PLAN:   Cecil was seen today for follow - up.    Diagnoses and all orders for this visit:    Essential hypertension, benign      Reintroduce Procardia.  Simply because the patient's blood pressure still elevated  There are no Patient Instructions on file for this visit.     The patient indicates understanding of these issues and agrees to the plan.  The patient is asked to No follow-ups on file..    Neal Cleveland MD

## 2024-04-02 NOTE — PATIENT INSTRUCTIONS
OUTPATIENT PROGRESS NOTE    HISTORY    The patient is a 65 year old male who comes in for follow-up.  In his last appointment here I ordered a cardiac CT with calcium scoring and his total was found to be 405.  Nuclear medicine stress test was then ordered an was fortunately normal.  I switched his simvastatin to rosuvastatin and he is tolerating it well.  No myalgias.  LDL did fall below 70 which was the goal.  Blood pressure is typically better at home than it is here in the office.  No recent flare-ups of gout.  Recent PSA was normal.  Gets up about 1 time a night to use the bathroom and finds this tolerable.  He continues to have some lower back pain but will be seeing pain management on Thursday for follow-up.  It is improving, but has not resolved.  Wt has gone up, but he is hoping to start exercising again soon.  He admits to drinking a lot of milk which has contributed to his wt gain. He also is worried about trigger finger he has developed in his right ring and left middle fingers.  He is interested in steroid injections as he had 1 to treat his left ring finger years ago and it was effective in resolving his trigger finger.    Patient Active Problem List   Diagnosis    Hypogonadotropic hypogonadism (CMD)    OLGA on CPAP    Obesity    After-cataract, obscuring vision, left eye    Essential hypertension    Mixed hyperlipidemia    Hyperuricemia       Current Outpatient Medications   Medication Sig    allopurinol (ZYLOPRIM) 100 MG tablet Take 1 tablet by mouth daily.    lisinopril-hydroCHLOROthiazide (ZESTORETIC) 20-25 MG per tablet Take 1 tablet by mouth daily.    DULoxetine (CYMBALTA) 60 MG capsule Take 1 capsule by mouth at bedtime.    rosuvastatin (CRESTOR) 40 MG tablet Take 1 tablet by mouth daily.    tiZANidine (ZANAFLEX) 4 MG tablet Take 1 tablet by mouth at bedtime.    b complex vitamins capsule Take 4 capsules by mouth daily.    Omega-3 Fatty Acids (FISH OIL) 1000 MG capsule Take 3 g by mouth nightly.  seeSpecialist as advised.   Go to ER symptoms signs worsen    Dorzolamide HCl-Timolol Mal (COSOPT OP) Place 1 drop into both eyes 2 times daily.     Cholecalciferol (VITAMIN D) 2000 UNIT OR TABS Take 2,000 Units by mouth daily.    ASPIRIN 81 MG PO TABS Take 162 mg by mouth daily.     No current facility-administered medications for this visit.       Allergies as of 04/02/2024    (No Known Allergies)     Social History     Tobacco Use    Smoking status: Never    Smokeless tobacco: Never   Vaping Use    Vaping Use: never used   Substance Use Topics    Alcohol use: Yes     Alcohol/week: 4.2 standard drinks of alcohol     Types: 5 Standard drinks or equivalent per week    Drug use: No         PHYSICAL EXAM  Vitals: Blood pressure 138/80, pulse 74, temperature 96.6 °F (35.9 °C), temperature source Temporal, resp. rate 16, height 5' 9\" (1.753 m), weight 136 kg (299 lb 12.8 oz).  General:  Well-developed and well nourished.  In no acute distress.   Heart::  Regular rate and rhythm.  Normal S1 and S2 without S3 or S4.  No murmur, rub or gallop.  Lungs:  Clear to auscultation.  No rales, rhonchi or wheezes    ASSESSMENT/PLAN  Trigger finger, unspecified finger, unspecified laterality  (primary encounter diagnosis)  Plan: SERVICE TO SURGERY PLASTICS - will refer to Dr. Fields to discuss steroid injections and potential release should those not be effective.     Essential hypertension - under fair control.  Continue lisinopril HCTZ without change.    Mixed hyperlipidemia - improved with rosuvastatin 40 mg daily.    Elevated coronary artery calcium score - no evidence of obstruction as nuclear medicine stress test was normal and he does not have any chest pain.  Continue rosuvastatin 40 mg daily and aspirin 81 mg daily.  Weight loss recommended as well as exercise.  He agrees to work on this and will also reduce his intake of milk to improve his weight.

## 2024-04-08 RX ORDER — TAMSULOSIN HYDROCHLORIDE 0.4 MG/1
0.4 CAPSULE ORAL
Qty: 90 CAPSULE | Refills: 0 | Status: SHIPPED | OUTPATIENT
Start: 2024-04-08

## 2024-04-08 NOTE — TELEPHONE ENCOUNTER
Requesting   Name from pharmacy: TAMSULOSIN 0.4MG CAPSULES          Will file in chart as: TAMSULOSIN 0.4 MG Oral Cap    Sig: TAKE 1 CAPSULE(0.4 MG) BY MOUTH AFTER DINNER    Disp: 90 capsule    Refills: 0 (Pharmacy requested: Not specified)    Start: 4/5/2024    Class: Normal    Non-formulary    Last ordered: 1 month ago (2/20/2024) by Neal Cleveland MD    Last refill: 2/20/2024    Rx #: [id number]    Genitourinary Medications [id number]/05/2024 05:37 AM   Protocol Details Patient does not have pulmonary hypertension on problem list    In person appointment or virtual visit in the past 12 mos or appointment in next 3 mos        LOV: 2/22/2024  RTC: 3 months   Last Relevant Labs: n/a   Filled: 2/20/2024 #90 with 0 refills    No future appointments.

## 2024-05-07 ENCOUNTER — OFFICE VISIT (OUTPATIENT)
Dept: INTERNAL MEDICINE CLINIC | Facility: CLINIC | Age: 84
End: 2024-05-07
Payer: MEDICARE

## 2024-05-07 VITALS
RESPIRATION RATE: 16 BRPM | BODY MASS INDEX: 23.39 KG/M2 | TEMPERATURE: 98 F | DIASTOLIC BLOOD PRESSURE: 74 MMHG | HEART RATE: 74 BPM | SYSTOLIC BLOOD PRESSURE: 160 MMHG | WEIGHT: 149 LBS | HEIGHT: 67 IN | OXYGEN SATURATION: 96 %

## 2024-05-07 DIAGNOSIS — I10 ESSENTIAL HYPERTENSION, BENIGN: Chronic | ICD-10-CM

## 2024-05-07 DIAGNOSIS — K40.90 NON-RECURRENT UNILATERAL INGUINAL HERNIA WITHOUT OBSTRUCTION OR GANGRENE: Primary | ICD-10-CM

## 2024-05-07 PROCEDURE — 3078F DIAST BP <80 MM HG: CPT | Performed by: INTERNAL MEDICINE

## 2024-05-07 PROCEDURE — 99213 OFFICE O/P EST LOW 20 MIN: CPT | Performed by: INTERNAL MEDICINE

## 2024-05-07 PROCEDURE — 1159F MED LIST DOCD IN RCRD: CPT | Performed by: INTERNAL MEDICINE

## 2024-05-07 PROCEDURE — 3077F SYST BP >= 140 MM HG: CPT | Performed by: INTERNAL MEDICINE

## 2024-05-07 PROCEDURE — 1160F RVW MEDS BY RX/DR IN RCRD: CPT | Performed by: INTERNAL MEDICINE

## 2024-05-07 PROCEDURE — 3008F BODY MASS INDEX DOCD: CPT | Performed by: INTERNAL MEDICINE

## 2024-05-07 RX ORDER — NIFEDIPINE 30 MG
30 TABLET, EXTENDED RELEASE ORAL NIGHTLY
Qty: 90 TABLET | Refills: 3 | Status: SHIPPED | OUTPATIENT
Start: 2024-05-07 | End: 2025-05-07

## 2024-05-07 NOTE — PROGRESS NOTES
Cecil DAVIS 1940 is a 84 year old male.    Chief Complaint   Patient presents with    Growth     Upper part of right leg       HPI:    lump in the right groin for the last few days now getting bigger no other GI or  complaints at the moment.  Current Outpatient Medications   Medication Sig Dispense Refill    NIFEdipine ER 30 MG Oral Tablet 24 Hr Take 1 tablet (30 mg total) by mouth at bedtime. 90 tablet 3    tamsulosin 0.4 MG Oral Cap Take 1 capsule (0.4 mg total) by mouth After dinner. 90 capsule 0    ELIQUIS 5 MG Oral Tab Take 1 tablet (5 mg total) by mouth 2 (two) times daily.      Calcium 500 MG Oral Tab Take 500 mg by mouth in the morning and 500 mg before bedtime. 30 tablet 0    enalapril 10 MG Oral Tab Take 1 tablet (10 mg total) by mouth 2 (two) times daily.  0    atenolol 50 MG Oral Tab Take 1 tablet (50 mg total) by mouth every morning.  0    hydroCHLOROthiazide 25 MG Oral Tab Take 1 tablet (25 mg total) by mouth every other day. 30 tablet 3    OMEPRAZOLE 20 MG Oral Capsule Delayed Release TAKE 1 CAPSULE EVERY DAY (Patient taking differently: as needed.) 90 capsule 0    SYNTHROID 112 MCG Oral Tab Take 1 tablet (112 mcg total) by mouth daily. 90 tablet 3    Multiple Vitamins-Minerals (MULTI-VITAMIN/MINERALS) Oral Tab Take 1 tablet by mouth daily.      Cholecalciferol (VITAMIN D) 2000 UNITS Oral Cap Take 1 capsule (2,000 Units total) by mouth daily.        Past Medical History:    Arthritis    knee artorits    Atherosclerosis of aorta (HCC)    Ct chest 2011    CANCER    Diverticulosis    DJD (degenerative joint disease)    Dr. Schafer    Lumbar radiculopathy    Malignant neoplasm of thyroid gland (HCC)    Osteoarthrosis, unspecified whether generalized or localized, unspecified site    Osteopenia of thigh    Osteopenia of thigh, unspecified laterality    Prostate hypertrophy    Thalassemia minor    Thyroid cancer (HCC)    Dr. Marin at [facility]    Unspecified essential hypertension    Varicocele       Social History:  Social History     Socioeconomic History    Marital status:    Tobacco Use    Smoking status: Never    Smokeless tobacco: Never   Vaping Use    Vaping status: Never Used   Substance and Sexual Activity    Alcohol use: No    Drug use: No   Other Topics Concern    Caffeine Concern No    Exercise No    Seat Belt No    Special Diet No    Stress Concern No    Weight Concern No        REVIEW OF SYSTEMS:    No GI or  complaints at the moment except as listed above        EXAM:   /74 (BP Location: Right arm, Patient Position: Sitting, Cuff Size: adult)   Pulse 74   Temp 97.9 °F (36.6 °C) (Temporal)   Resp 16   Ht 5' 7\" (1.702 m)   Wt 149 lb (67.6 kg)   SpO2 96%   BMI 23.34 kg/m²     Upper abdomen unremarkable patient does have a patulous abdomen.  He does have a right inguinal hernia reducible fairly large      ASSESSMENT AND PLAN:   Cecil was seen today for growth.    Diagnoses and all orders for this visit:    Non-recurrent unilateral inguinal hernia without obstruction or gangrene  -     Surgery Referral - In Network    Essential hypertension, benign  -     NIFEdipine ER 30 MG Oral Tablet 24 Hr; Take 1 tablet (30 mg total) by mouth at bedtime.         There are no Patient Instructions on file for this visit.   The patient indicates understanding of these issues and agrees to the plan.  The patient is asked to No follow-ups on file.  .      Neal Cleveland MD

## 2024-05-21 ENCOUNTER — OFFICE VISIT (OUTPATIENT)
Facility: LOCATION | Age: 84
End: 2024-05-21

## 2024-05-21 VITALS — TEMPERATURE: 97 F | HEART RATE: 70 BPM

## 2024-05-21 DIAGNOSIS — K40.20 NON-RECURRENT BILATERAL INGUINAL HERNIA WITHOUT OBSTRUCTION OR GANGRENE: Primary | ICD-10-CM

## 2024-05-21 PROBLEM — K40.90 LEFT INGUINAL HERNIA: Status: ACTIVE | Noted: 2024-05-21

## 2024-05-21 PROCEDURE — 1160F RVW MEDS BY RX/DR IN RCRD: CPT | Performed by: STUDENT IN AN ORGANIZED HEALTH CARE EDUCATION/TRAINING PROGRAM

## 2024-05-21 PROCEDURE — 99202 OFFICE O/P NEW SF 15 MIN: CPT | Performed by: STUDENT IN AN ORGANIZED HEALTH CARE EDUCATION/TRAINING PROGRAM

## 2024-05-21 PROCEDURE — 1159F MED LIST DOCD IN RCRD: CPT | Performed by: STUDENT IN AN ORGANIZED HEALTH CARE EDUCATION/TRAINING PROGRAM

## 2024-05-21 NOTE — H&P
Patient ID: Cecil Aguayo is a 84 year old male.    Chief Complaint   Patient presents with    New Patient     NP - Non-recurrent unilateral inguinal hernia without obstruction or gangrene, no imaging, ref by Dr. Cleveland, Pt. States uncomfortable, discomfort on and off, Pt. States hernia is bulging especially with activities and movement.            HPI: Cecil Aguayo is a 84 year old male presents to clinic for evaluation.  Patient reports noticing a bulge in his right groin for the past couple years.  He states that he does not have any tenderness but noticed discomfort toward the end of the day.  He denies any nausea, vomiting, or changes in his bowel habits.  He went to his PCP and was referred to general surgery for further evaluation and management.  Patient has significant abdominal surgical history for laparoscopic cholecystectomy couple years ago.  He has medical history of A-fib on Eliquis, hypertension, and papillary thyroid cancer status post thyroidectomy.    Workup: None      Past Medical History  Past Medical History:    Arthritis    knee artorits    Atherosclerosis of aorta (HCC)    Ct chest 7/2011    CANCER    Diverticulosis    DJD (degenerative joint disease)    Dr. Schafer    Lumbar radiculopathy    Malignant neoplasm of thyroid gland (HCC)    Osteoarthrosis, unspecified whether generalized or localized, unspecified site    Osteopenia of thigh    Osteopenia of thigh, unspecified laterality    Prostate hypertrophy    Thalassemia minor    Thyroid cancer (HCC)    Dr. Marin at [facility]    Unspecified essential hypertension    Varicocele       Past Surgical History  Past Surgical History:   Procedure Laterality Date    Cataract  2009    bilateral eyes     Cholecystectomy      Injection, anesthetic/steroid, transforaminal epidural; lumbar/sacral, add'l level  6/21/2013    Procedure: TRANSFORAMINAL EPIDURAL - LUMBAR;  Surgeon: Torres Cruz MD;  Location: [facility]    Injection,  anesthetic/steroid, transforaminal epidural; lumbar/sacral, add'l level  8/15/2013    Procedure: TRANSFORAMINAL EPIDURAL - LUMBAR;  Surgeon: Torres Cruz MD;  Location: [facility]    Injection, anesthetic/steroid, transforaminal epidural; lumbar/sacral, single level  5/24/2013    Procedure: TRANSFORAMINAL EPIDURAL - LUMBAR;  Surgeon: Torres Cruz MD;  Location: [facility]    Injection, anesthetic/steroid, transforaminal epidural; lumbar/sacral, single level  6/21/2013    Procedure: TRANSFORAMINAL EPIDURAL - LUMBAR;  Surgeon: Torres Cruz MD;  Location: [facility]    Injection, anesthetic/steroid, transforaminal epidural; lumbar/sacral, single level  8/15/2013    Procedure: TRANSFORAMINAL EPIDURAL - LUMBAR;  Surgeon: Torres Cruz MD;  Location: [facility]    Other surgical history      Thyroid Cancer     Other surgical history  2000    UNSPECIFIED SURGERY     Other surgical history  2005    Left salivary gland, removal of cancer      Thyroidectomy  2008    papillary cancer        Medications  Current Outpatient Medications   Medication Sig Dispense Refill    NIFEdipine ER 30 MG Oral Tablet 24 Hr Take 1 tablet (30 mg total) by mouth at bedtime. 90 tablet 3    tamsulosin 0.4 MG Oral Cap Take 1 capsule (0.4 mg total) by mouth After dinner. 90 capsule 0    ELIQUIS 5 MG Oral Tab Take 1 tablet (5 mg total) by mouth 2 (two) times daily.      Calcium 500 MG Oral Tab Take 500 mg by mouth in the morning and 500 mg before bedtime. 30 tablet 0    enalapril 10 MG Oral Tab Take 1 tablet (10 mg total) by mouth 2 (two) times daily.  0    atenolol 50 MG Oral Tab Take 1 tablet (50 mg total) by mouth every morning.  0    hydroCHLOROthiazide 25 MG Oral Tab Take 1 tablet (25 mg total) by mouth every other day. 30 tablet 3    OMEPRAZOLE 20 MG Oral Capsule Delayed Release TAKE 1 CAPSULE EVERY DAY (Patient taking differently: as needed.) 90  capsule 0    SYNTHROID 112 MCG Oral Tab Take 1 tablet (112 mcg total) by mouth daily. 90 tablet 3    Multiple Vitamins-Minerals (MULTI-VITAMIN/MINERALS) Oral Tab Take 1 tablet by mouth daily.      Cholecalciferol (VITAMIN D) 2000 UNITS Oral Cap Take 1 capsule (2,000 Units total) by mouth daily.         Allergies  Allergies   Allergen Reactions    Bactrim [Sulfamethoxazole W/Trimethoprim] RASH and FEVER       Social History  History   Smoking Status    Never   Smokeless Tobacco    Never     History   Alcohol Use No     History   Drug Use No       Family History  Family History   Problem Relation Age of Onset    Heart Disorder Father     Heart Attack Father     Hypertension Father     Hypertension Mother     Other (Other) Mother         unspecified CVA    Stroke Mother     Hypertension Other         unspecified sibling     Heart Disease Other         siblings     Hypertension Brother     Stroke Brother        Review of Systems  Review of Systems   Constitutional: Negative.    Respiratory: Negative.     Cardiovascular: Negative.    Gastrointestinal:  Negative for abdominal distention, abdominal pain, constipation, nausea and vomiting.       Exam  Vitals:    05/21/24 0912   Pulse: 70   Temp: 97.3 °F (36.3 °C)     Physical Exam  Constitutional:       Appearance: Normal appearance.   Cardiovascular:      Rate and Rhythm: Normal rate.   Pulmonary:      Effort: Pulmonary effort is normal.   Abdominal:      General: There is no distension.      Palpations: Abdomen is soft.      Tenderness: There is no abdominal tenderness.      Hernia: A hernia is present.       Musculoskeletal:         General: Normal range of motion.   Skin:     General: Skin is warm and dry.   Neurological:      Mental Status: He is alert and oriented to person, place, and time.           Assessment/Plan  Assessment   Problem List Items Addressed This Visit          Gastrointestinal and Abdominal    Non-recurrent bilateral inguinal hernia without  obstruction or gangrene - Primary       Cecil Aguayo is a 84 year old male with bilateral inguinal hernias    On physical exam, patient has a large right inguinal hernia that is easily reducible, he has a small left inguinal hernia  CT images from 2022 were reviewed with the patient and by me directly and it shows a small left inguinal hernia  Patient reports some discomfort in his right groin especially at the end of the day  I recommend moving forward with right inguinal hernia repair, possible bilateral inguinal hernia  I discussed using the robot to do the repair  I also discussed the use of permanent mesh at the time of surgery  Risks of the procedure include bleeding, pain, infection, injury to anything in the abdomen, and risk of recurrence  Patient acknowledged understanding  At this time, he will discuss with his family if he like to move forward with surgery    Patient does have a significant history of A-fib on Eliquis  He will need cardiac clearance as well as anticoagulation management  Patient also has history of hypertension,  he will need medical clearance as well    Symptoms of incarceration reviewed, these include severe pain and inability to reduce the hernia.  If patient has any of the symptoms, he is to go to the emergency room as he may need surgery sooner    Patient is to call the office for any questions or concerns      Etta Berry MD  General Surgery  [facility]     CC:  Neal Cleveland MD

## 2024-07-05 NOTE — TELEPHONE ENCOUNTER
Protocol passed     LOV: 5/7/24   RTC: no follow ups on file   Filled: 4/8/24 #90 0 refills   Labs: 2/20/24   No future appointments.

## 2024-07-08 RX ORDER — TAMSULOSIN HYDROCHLORIDE 0.4 MG/1
0.4 CAPSULE ORAL
Qty: 90 CAPSULE | Refills: 0 | Status: SHIPPED | OUTPATIENT
Start: 2024-07-08

## 2024-08-10 ENCOUNTER — TELEPHONE (OUTPATIENT)
Dept: INTERNAL MEDICINE CLINIC | Facility: CLINIC | Age: 84
End: 2024-08-10

## 2024-08-10 DIAGNOSIS — U07.1 COVID-19: Primary | ICD-10-CM

## 2024-09-23 ENCOUNTER — TELEPHONE (OUTPATIENT)
Dept: INTERNAL MEDICINE CLINIC | Facility: CLINIC | Age: 84
End: 2024-09-23

## 2024-09-23 DIAGNOSIS — K40.90 NON-RECURRENT UNILATERAL INGUINAL HERNIA WITHOUT OBSTRUCTION OR GANGRENE: Primary | ICD-10-CM

## 2024-09-23 NOTE — TELEPHONE ENCOUNTER
Patient called in, advised he would like to see a different general surgeon for his possible hernia surgery.     Patient is requesting pcp recommendations.     Please advise.     # [phone]

## 2024-10-22 ENCOUNTER — TELEPHONE (OUTPATIENT)
Facility: LOCATION | Age: 84
End: 2024-10-22

## 2024-10-22 ENCOUNTER — OFFICE VISIT (OUTPATIENT)
Facility: LOCATION | Age: 84
End: 2024-10-22
Payer: MEDICARE

## 2024-10-22 VITALS
OXYGEN SATURATION: 99 % | BODY MASS INDEX: 22.6 KG/M2 | HEART RATE: 63 BPM | TEMPERATURE: 98 F | DIASTOLIC BLOOD PRESSURE: 72 MMHG | WEIGHT: 144 LBS | SYSTOLIC BLOOD PRESSURE: 136 MMHG | HEIGHT: 67 IN

## 2024-10-22 DIAGNOSIS — K40.20 BILATERAL INGUINAL HERNIA WITHOUT OBSTRUCTION OR GANGRENE, RECURRENCE NOT SPECIFIED: Primary | ICD-10-CM

## 2024-10-22 DIAGNOSIS — K40.20 NON-RECURRENT BILATERAL INGUINAL HERNIA WITHOUT OBSTRUCTION OR GANGRENE: Primary | ICD-10-CM

## 2024-10-22 PROCEDURE — 3075F SYST BP GE 130 - 139MM HG: CPT | Performed by: SURGERY

## 2024-10-22 PROCEDURE — 1159F MED LIST DOCD IN RCRD: CPT | Performed by: SURGERY

## 2024-10-22 PROCEDURE — 1160F RVW MEDS BY RX/DR IN RCRD: CPT | Performed by: SURGERY

## 2024-10-22 PROCEDURE — 3008F BODY MASS INDEX DOCD: CPT | Performed by: SURGERY

## 2024-10-22 PROCEDURE — 99214 OFFICE O/P EST MOD 30 MIN: CPT | Performed by: SURGERY

## 2024-10-22 PROCEDURE — 3078F DIAST BP <80 MM HG: CPT | Performed by: SURGERY

## 2024-10-22 NOTE — H&P (VIEW-ONLY)
New Patient Visit Note       Active Problems      1. Non-recurrent bilateral inguinal hernia without obstruction or gangrene        Chief Complaint   Chief Complaint   Patient presents with    New Patient     NP - second opinion,  Non-recurrent unilateral inguinal hernia without obstruction or gangrene, no symptoms.       History of Present Illness     The patient presents at the request of his primary care physician for second opinion regarding inguinal hernia.  The patient states that he is recently noted bulging in the right inguinal region that worsens with exertion and activity.  The patient states the bulge in the right inguinal region reduces when he is recumbent.  No other specific palliative or provocative factors noted.  There is occasional discomfort in the groin but the patient reports no pain remote from the inguinal region.    The patient denies fever, chills, chest pain, shortness of breath, dyspnea. The patient also denies hematemesis, melena, or hematochezia. The patient denies change in bowel or bladder habits. There is no complaint of hematuria or dysuria.    I discussed the risk, benefits, alternatives of surgery.  I discussed the anticipated postoperative recovery including dietary, activity, exercise, and lifestyle recommendations.  The patient's questions regarding surgical procedure were answered and the patient voiced understanding of the care plan.    The patient takes Eliquis for heart arrhythmia.    Allergies  Cecil is allergic to bactrim [sulfamethoxazole w/trimethoprim].    Past Medical / Surgical / Social / Family History    The past medical and past surgical history have been reviewed by me today.    Past Medical History:    Arthritis    knee artorits    Atherosclerosis of aorta (HCC)    Ct chest 7/2011    CANCER    Cancer (HCC)    Diverticulosis    DJD (degenerative joint disease)    Dr. Schafer    High blood pressure    Lumbar radiculopathy    Malignant neoplasm of thyroid gland  (HCC)    Osteoarthrosis, unspecified whether generalized or localized, unspecified site    Osteopenia of thigh    Osteopenia of thigh, unspecified laterality    Prostate hypertrophy    Thalassemia minor    Thyroid cancer (HCC)    Dr. Marin at [facility]    Unspecified essential hypertension    Varicocele     Past Surgical History:   Procedure Laterality Date    Cataract  2009    bilateral eyes     Cholecystectomy      Colonoscopy      Injection, anesthetic/steroid, transforaminal epidural; lumbar/sacral, add'l level  06/21/2013    Procedure: TRANSFORAMINAL EPIDURAL - LUMBAR;  Surgeon: Torres Cruz MD;  Location: [facility]    Injection, anesthetic/steroid, transforaminal epidural; lumbar/sacral, add'l level  08/15/2013    Procedure: TRANSFORAMINAL EPIDURAL - LUMBAR;  Surgeon: Torres Cruz MD;  Location: [facility]    Injection, anesthetic/steroid, transforaminal epidural; lumbar/sacral, single level  05/24/2013    Procedure: TRANSFORAMINAL EPIDURAL - LUMBAR;  Surgeon: Torres Cruz MD;  Location: [facility]    Injection, anesthetic/steroid, transforaminal epidural; lumbar/sacral, single level  06/21/2013    Procedure: TRANSFORAMINAL EPIDURAL - LUMBAR;  Surgeon: Torres Cruz MD;  Location: [facility]    Injection, anesthetic/steroid, transforaminal epidural; lumbar/sacral, single level  08/15/2013    Procedure: TRANSFORAMINAL EPIDURAL - LUMBAR;  Surgeon: Torres Cruz MD;  Location: [facility]    Knee replacement surgery  12/04/2024    Other surgical history      Thyroid Cancer     Other surgical history  2000    UNSPECIFIED SURGERY     Other surgical history  2005    Left salivary gland, removal of cancer      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    Family History   Problem Relation Age of Onset    Heart Disorder Father      Heart Attack Father     Hypertension Father     Hypertension Mother     Other (Other) Mother         unspecified CVA    Stroke Mother     Hypertension Other         unspecified sibling     Heart Disease Other         siblings     Hypertension Brother     Stroke Brother      Social History     Socioeconomic History    Marital status:    Tobacco Use    Smoking status: Never    Smokeless tobacco: Never   Vaping Use    Vaping status: Never Used   Substance and Sexual Activity    Alcohol use: No    Drug use: No   Other Topics Concern    Caffeine Concern No    Exercise No    Seat Belt No    Special Diet No    Stress Concern No    Weight Concern No        Current Outpatient Medications:     tamsulosin 0.4 MG Oral Cap, Take 1 capsule (0.4 mg total) by mouth After dinner., Disp: 90 capsule, Rfl: 0    NIFEdipine ER 30 MG Oral Tablet 24 Hr, Take 1 tablet (30 mg total) by mouth at bedtime., Disp: 90 tablet, Rfl: 3    ELIQUIS 5 MG Oral Tab, Take 1 tablet (5 mg total) by mouth 2 (two) times daily., Disp: , Rfl:     Calcium 500 MG Oral Tab, Take 500 mg by mouth in the morning and 500 mg before bedtime., Disp: 30 tablet, Rfl: 0    enalapril 10 MG Oral Tab, Take 1 tablet (10 mg total) by mouth 2 (two) times daily., Disp: , Rfl: 0    atenolol 50 MG Oral Tab, Take 1 tablet (50 mg total) by mouth every morning., Disp: , Rfl: 0    hydroCHLOROthiazide 25 MG Oral Tab, Take 1 tablet (25 mg total) by mouth every other day., Disp: 30 tablet, Rfl: 3    OMEPRAZOLE 20 MG Oral Capsule Delayed Release, TAKE 1 CAPSULE EVERY DAY (Patient taking differently: as needed.), Disp: 90 capsule, Rfl: 0    SYNTHROID 112 MCG Oral Tab, Take 1 tablet (112 mcg total) by mouth daily., Disp: 90 tablet, Rfl: 3    Multiple Vitamins-Minerals (MULTI-VITAMIN/MINERALS) Oral Tab, Take 1 tablet by mouth daily., Disp: , Rfl:     Cholecalciferol (VITAMIN D) 2000 UNITS Oral Cap, Take 1 capsule (2,000 Units total) by mouth daily., Disp: , Rfl:       Review of  Systems  The Review of Systems has been reviewed by me during today.  Review of Systems   Constitutional: Negative.    HENT: Negative.     Eyes: Negative.    Respiratory: Negative.     Cardiovascular: Negative.    Gastrointestinal: Negative.    Genitourinary: Negative.    Musculoskeletal: Negative.    Skin: Negative.    Neurological: Negative.    Psychiatric/Behavioral: Negative.         Physical Findings   /72 (BP Location: Right arm, Patient Position: Sitting, Cuff Size: adult)   Pulse 63   Temp 98.3 °F (36.8 °C) (Temporal)   Ht 67\"   Wt 144 lb (65.3 kg)   SpO2 99%   BMI 22.55 kg/m²   Physical Exam  Vitals and nursing note reviewed.   Constitutional:       General: He is not in acute distress.     Appearance: He is well-developed.   HENT:      Head: Normocephalic and atraumatic.   Eyes:      General: No scleral icterus.     Pupils: Pupils are equal, round, and reactive to light.   Neck:      Vascular: No JVD.      Trachea: No tracheal deviation.   Cardiovascular:      Rate and Rhythm: Normal rate and regular rhythm.   Pulmonary:      Effort: Pulmonary effort is normal. No respiratory distress.      Breath sounds: No stridor.   Abdominal:      General: Bowel sounds are normal. There is no distension.      Palpations: Abdomen is soft. Abdomen is not rigid. There is no mass.      Tenderness: There is no abdominal tenderness. There is no guarding or rebound. Negative signs include Koch's sign and McBurney's sign.      Hernia: A hernia is present. Hernia is present in the left inguinal area and right inguinal area.   Genitourinary:     Penis: Normal. No phimosis, paraphimosis, hypospadias, erythema, tenderness, discharge, swelling or lesions.       Testes: Normal.         Right: Mass, tenderness, swelling, testicular hydrocele or varicocele not present. Right testis is descended. Cremasteric reflex is present.          Left: Mass, tenderness, swelling, testicular hydrocele or varicocele not present. Left  testis is descended. Cremasteric reflex is present.       Epididymis:      Right: Normal.      Left: Normal.       Musculoskeletal:         General: Normal range of motion.      Cervical back: Normal range of motion and neck supple.   Skin:     General: Skin is warm and dry.   Neurological:      Mental Status: He is alert and oriented to person, place, and time.   Psychiatric:         Behavior: Behavior normal.             Assessment   1. Non-recurrent bilateral inguinal hernia without obstruction or gangrene          Plan     The patient will be scheduled for robotic bilateral inguinal hernia repair with mesh.    The patient will need to withhold Eliquis prior to surgery.  We will request anticoagulation management recommendations from the patient's cardiologist.    The jennifer-operative care plan was discussed with the patient, who voices understanding.  Activity and lifting recommendations were discussed in length.     The risks, benefits, and alternatives to the procedure were explained to the patient.  The risks explained include, but are not limited to, bleeding, infection, pain wound complications, recurrence, incorrect diagnosis, injury to adjacent organs and structures. We also discussed the possibile need for further therapeutic, diagnostic, or surgical intervention.  The patient voiced understanding, and after all questions were answered to the patient's satisfaction, the patient provided willing and informed consent to proceed.     No orders of the defined types were placed in this encounter.      Imaging & Referrals   None    Follow Up  No follow-ups on file.    You Rosario MD    Date of Surgery:     DX:     ICD-10-CM   1. Non-recurrent bilateral inguinal hernia without obstruction or gangrene  K40.20        Surgery Site :RIGHT / LEFT / BILATERAL: bilateral    [] Open  [] Laparoscopic  [x] Robotic    [] Pilonidal Cystectomy  [] Excision of Lipomas     [] Sigmoidectomies    [] Hemorrhoidectomy   []  Transanal Hemorrhoidal Dearterialization [] Rectal Exam Under Anesthesia  [] Ureteral stent, Urologist  [] Hernia   [] Ventral  [] Umbilical  [x] Inguinal  [] Incisional  [] Paraesophageal [] Component Separation (TAR)   [] Cholecystectomy  [] Appendectomy    [] Port placement      Anesthesia: ANESTHESIA TYPE: Gen    Location:  [] [place] [facility]   [] [facility]  [facility]  [x] [facility]   [] [facility]    Admission Status: EDW OR ADMIT LOCATION: [facility]    SA Needed: Yes/No: Yes: General Surgery PA       Clearance Needed:  []Medical Clearance   [x]Cardiac Clearance:   [x]Anticoagulation Management:    []Renal:   []Neuro:     Pre-Admission Testing:  Surgeon's Personalized order Set.   Prophylactic Antibiotics Order: Prophylactic antibiotic protocol    Equipment:   [] C-Arm  Other:      You Rosario MD FACS  Trauma Medical Director, [facility] General Surgery    The 21st Century Cures Act makes medical notes like these available to patients in the interest of transparency. Please be advised this is a medical document. Medical documents are intended to carry relevant information, facts as evident, and the clinical opinion of the practitioner. The medical note is intended as peer to peer communication and may appear blunt or direct. It is written in medical language and may contain abbreviations or verbiage that are unfamiliar.    This note was prepared using Dragon Medical voice recognition dictation software. As a result, errors may occur. When identified, these errors have been corrected. While every attempt is made to correct errors during dictation, discrepancies may still exist.

## 2024-10-22 NOTE — H&P
New Patient Visit Note       Active Problems      1. Non-recurrent bilateral inguinal hernia without obstruction or gangrene        Chief Complaint   Chief Complaint   Patient presents with    New Patient     NP - second opinion,  Non-recurrent unilateral inguinal hernia without obstruction or gangrene, no symptoms.       History of Present Illness     The patient presents at the request of his primary care physician for second opinion regarding inguinal hernia.  The patient states that he is recently noted bulging in the right inguinal region that worsens with exertion and activity.  The patient states the bulge in the right inguinal region reduces when he is recumbent.  No other specific palliative or provocative factors noted.  There is occasional discomfort in the groin but the patient reports no pain remote from the inguinal region.    The patient denies fever, chills, chest pain, shortness of breath, dyspnea. The patient also denies hematemesis, melena, or hematochezia. The patient denies change in bowel or bladder habits. There is no complaint of hematuria or dysuria.    I discussed the risk, benefits, alternatives of surgery.  I discussed the anticipated postoperative recovery including dietary, activity, exercise, and lifestyle recommendations.  The patient's questions regarding surgical procedure were answered and the patient voiced understanding of the care plan.    The patient takes Eliquis for heart arrhythmia.    Allergies  Cecil is allergic to bactrim [sulfamethoxazole w/trimethoprim].    Past Medical / Surgical / Social / Family History    The past medical and past surgical history have been reviewed by me today.    Past Medical History:    Arthritis    knee artorits    Atherosclerosis of aorta (HCC)    Ct chest 7/2011    CANCER    Cancer (HCC)    Diverticulosis    DJD (degenerative joint disease)    Dr. Schafer    High blood pressure    Lumbar radiculopathy    Malignant neoplasm of thyroid gland  (HCC)    Osteoarthrosis, unspecified whether generalized or localized, unspecified site    Osteopenia of thigh    Osteopenia of thigh, unspecified laterality    Prostate hypertrophy    Thalassemia minor    Thyroid cancer (HCC)    Dr. Marin at [facility]    Unspecified essential hypertension    Varicocele     Past Surgical History:   Procedure Laterality Date    Cataract  2009    bilateral eyes     Cholecystectomy      Colonoscopy      Injection, anesthetic/steroid, transforaminal epidural; lumbar/sacral, add'l level  06/21/2013    Procedure: TRANSFORAMINAL EPIDURAL - LUMBAR;  Surgeon: Torres Cruz MD;  Location: [facility]    Injection, anesthetic/steroid, transforaminal epidural; lumbar/sacral, add'l level  08/15/2013    Procedure: TRANSFORAMINAL EPIDURAL - LUMBAR;  Surgeon: Torres Cruz MD;  Location: [facility]    Injection, anesthetic/steroid, transforaminal epidural; lumbar/sacral, single level  05/24/2013    Procedure: TRANSFORAMINAL EPIDURAL - LUMBAR;  Surgeon: Torres Cruz MD;  Location: [facility]    Injection, anesthetic/steroid, transforaminal epidural; lumbar/sacral, single level  06/21/2013    Procedure: TRANSFORAMINAL EPIDURAL - LUMBAR;  Surgeon: Torres Cruz MD;  Location: [facility]    Injection, anesthetic/steroid, transforaminal epidural; lumbar/sacral, single level  08/15/2013    Procedure: TRANSFORAMINAL EPIDURAL - LUMBAR;  Surgeon: Torres Cruz MD;  Location: [facility]    Knee replacement surgery  12/04/2024    Other surgical history      Thyroid Cancer     Other surgical history  2000    UNSPECIFIED SURGERY     Other surgical history  2005    Left salivary gland, removal of cancer      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    Family History   Problem Relation Age of Onset    Heart Disorder Father      Heart Attack Father     Hypertension Father     Hypertension Mother     Other (Other) Mother         unspecified CVA    Stroke Mother     Hypertension Other         unspecified sibling     Heart Disease Other         siblings     Hypertension Brother     Stroke Brother      Social History     Socioeconomic History    Marital status:    Tobacco Use    Smoking status: Never    Smokeless tobacco: Never   Vaping Use    Vaping status: Never Used   Substance and Sexual Activity    Alcohol use: No    Drug use: No   Other Topics Concern    Caffeine Concern No    Exercise No    Seat Belt No    Special Diet No    Stress Concern No    Weight Concern No        Current Outpatient Medications:     tamsulosin 0.4 MG Oral Cap, Take 1 capsule (0.4 mg total) by mouth After dinner., Disp: 90 capsule, Rfl: 0    NIFEdipine ER 30 MG Oral Tablet 24 Hr, Take 1 tablet (30 mg total) by mouth at bedtime., Disp: 90 tablet, Rfl: 3    ELIQUIS 5 MG Oral Tab, Take 1 tablet (5 mg total) by mouth 2 (two) times daily., Disp: , Rfl:     Calcium 500 MG Oral Tab, Take 500 mg by mouth in the morning and 500 mg before bedtime., Disp: 30 tablet, Rfl: 0    enalapril 10 MG Oral Tab, Take 1 tablet (10 mg total) by mouth 2 (two) times daily., Disp: , Rfl: 0    atenolol 50 MG Oral Tab, Take 1 tablet (50 mg total) by mouth every morning., Disp: , Rfl: 0    hydroCHLOROthiazide 25 MG Oral Tab, Take 1 tablet (25 mg total) by mouth every other day., Disp: 30 tablet, Rfl: 3    OMEPRAZOLE 20 MG Oral Capsule Delayed Release, TAKE 1 CAPSULE EVERY DAY (Patient taking differently: as needed.), Disp: 90 capsule, Rfl: 0    SYNTHROID 112 MCG Oral Tab, Take 1 tablet (112 mcg total) by mouth daily., Disp: 90 tablet, Rfl: 3    Multiple Vitamins-Minerals (MULTI-VITAMIN/MINERALS) Oral Tab, Take 1 tablet by mouth daily., Disp: , Rfl:     Cholecalciferol (VITAMIN D) 2000 UNITS Oral Cap, Take 1 capsule (2,000 Units total) by mouth daily., Disp: , Rfl:       Review of  Systems  The Review of Systems has been reviewed by me during today.  Review of Systems   Constitutional: Negative.    HENT: Negative.     Eyes: Negative.    Respiratory: Negative.     Cardiovascular: Negative.    Gastrointestinal: Negative.    Genitourinary: Negative.    Musculoskeletal: Negative.    Skin: Negative.    Neurological: Negative.    Psychiatric/Behavioral: Negative.         Physical Findings   /72 (BP Location: Right arm, Patient Position: Sitting, Cuff Size: adult)   Pulse 63   Temp 98.3 °F (36.8 °C) (Temporal)   Ht 67\"   Wt 144 lb (65.3 kg)   SpO2 99%   BMI 22.55 kg/m²   Physical Exam  Vitals and nursing note reviewed.   Constitutional:       General: He is not in acute distress.     Appearance: He is well-developed.   HENT:      Head: Normocephalic and atraumatic.   Eyes:      General: No scleral icterus.     Pupils: Pupils are equal, round, and reactive to light.   Neck:      Vascular: No JVD.      Trachea: No tracheal deviation.   Cardiovascular:      Rate and Rhythm: Normal rate and regular rhythm.   Pulmonary:      Effort: Pulmonary effort is normal. No respiratory distress.      Breath sounds: No stridor.   Abdominal:      General: Bowel sounds are normal. There is no distension.      Palpations: Abdomen is soft. Abdomen is not rigid. There is no mass.      Tenderness: There is no abdominal tenderness. There is no guarding or rebound. Negative signs include Koch's sign and McBurney's sign.      Hernia: A hernia is present. Hernia is present in the left inguinal area and right inguinal area.   Genitourinary:     Penis: Normal. No phimosis, paraphimosis, hypospadias, erythema, tenderness, discharge, swelling or lesions.       Testes: Normal.         Right: Mass, tenderness, swelling, testicular hydrocele or varicocele not present. Right testis is descended. Cremasteric reflex is present.          Left: Mass, tenderness, swelling, testicular hydrocele or varicocele not present. Left  testis is descended. Cremasteric reflex is present.       Epididymis:      Right: Normal.      Left: Normal.       Musculoskeletal:         General: Normal range of motion.      Cervical back: Normal range of motion and neck supple.   Skin:     General: Skin is warm and dry.   Neurological:      Mental Status: He is alert and oriented to person, place, and time.   Psychiatric:         Behavior: Behavior normal.             Assessment   1. Non-recurrent bilateral inguinal hernia without obstruction or gangrene          Plan     The patient will be scheduled for robotic bilateral inguinal hernia repair with mesh.    The patient will need to withhold Eliquis prior to surgery.  We will request anticoagulation management recommendations from the patient's cardiologist.    The jennifer-operative care plan was discussed with the patient, who voices understanding.  Activity and lifting recommendations were discussed in length.     The risks, benefits, and alternatives to the procedure were explained to the patient.  The risks explained include, but are not limited to, bleeding, infection, pain wound complications, recurrence, incorrect diagnosis, injury to adjacent organs and structures. We also discussed the possibile need for further therapeutic, diagnostic, or surgical intervention.  The patient voiced understanding, and after all questions were answered to the patient's satisfaction, the patient provided willing and informed consent to proceed.     No orders of the defined types were placed in this encounter.      Imaging & Referrals   None    Follow Up  No follow-ups on file.    You Rosario MD    Date of Surgery:     DX:     ICD-10-CM   1. Non-recurrent bilateral inguinal hernia without obstruction or gangrene  K40.20        Surgery Site :RIGHT / LEFT / BILATERAL: bilateral    [] Open  [] Laparoscopic  [x] Robotic    [] Pilonidal Cystectomy  [] Excision of Lipomas     [] Sigmoidectomies    [] Hemorrhoidectomy   []  Transanal Hemorrhoidal Dearterialization [] Rectal Exam Under Anesthesia  [] Ureteral stent, Urologist  [] Hernia   [] Ventral  [] Umbilical  [x] Inguinal  [] Incisional  [] Paraesophageal [] Component Separation (TAR)   [] Cholecystectomy  [] Appendectomy    [] Port placement      Anesthesia: ANESTHESIA TYPE: Gen    Location:  [] [place] [facility]   [] [facility]  [facility]  [[facility]] [facility]   [] [facility]    Admission Status: EDW OR ADMIT LOCATION: [facility]    SA Needed: Yes/No: Yes: General Surgery PA       Clearance Needed:  []Medical Clearance   [x]Cardiac Clearance:   [x]Anticoagulation Management:    []Renal:   []Neuro:     Pre-Admission Testing:  Surgeon's Personalized order Set.   Prophylactic Antibiotics Order: Prophylactic antibiotic protocol    Equipment:   [] C-Arm  Other:      You Rosario MD FACS  Trauma Medical Director, [facility] General Surgery    The 21st Century Cures Act makes medical notes like these available to patients in the interest of transparency. Please be advised this is a medical document. Medical documents are intended to carry relevant information, facts as evident, and the clinical opinion of the practitioner. The medical note is intended as peer to peer communication and may appear blunt or direct. It is written in medical language and may contain abbreviations or verbiage that are unfamiliar.    This note was prepared using Dragon Medical voice recognition dictation software. As a result, errors may occur. When identified, these errors have been corrected. While every attempt is made to correct errors during dictation, discrepancies may still exist.

## 2024-10-25 ENCOUNTER — TELEPHONE (OUTPATIENT)
Facility: LOCATION | Age: 84
End: 2024-10-25

## 2024-10-25 NOTE — TELEPHONE ENCOUNTER
Left detailed message to inform patient that cardiac clearance had been received and that he can hold his Eiquis 5 days prior to surgery.  Contact information left for any additional questions.

## 2024-10-25 NOTE — TELEPHONE ENCOUNTER
Cardiac Clearance and anticoagulation management  received from Dr Ghanshyam Germain.  Per note \"Stable CVS for surgery, May hold Eliquis for 5 days before surgery and restart after surgery.\" Faxed to preadmission testing,placed in binder, and patient notified.

## 2024-10-28 ENCOUNTER — TELEPHONE (OUTPATIENT)
Facility: LOCATION | Age: 84
End: 2024-10-28

## 2024-10-28 NOTE — TELEPHONE ENCOUNTER
MADISON PINTO Patient  Member ID  [id number]    Date of Birth  1940-04-09    Gender  Male    Eligibility Status  Active Coverage    Group Number  [id number]    Plan / Coverage Date  2018-01-01    Transaction Type  Outpatient Authorization/Referral    [facility]    Payer  [employer]    [employer] logo     Certificate Information  Reference Number  NA    Status  NO ACTION REQUIRED    [employer] Record Number  [id number]    Message  For Member contracts which cover this service, no prior authorization is necessary. Please contact [employer] at the number on the back of the Member ID card.  Member Information  Patient Name  MADISON PINTO    Patient Date of Birth  1940-04-09    Patient Gender  Male    Member ID  [id number]    Relationship to Subscriber  Self    Subscriber Name  MADISON PINTO    Requesting Provider     Name  PURNIMA CLAY  [id number]    Specialty  [id number]    Provider Role  Provider    Address  [address] [place] [address]    Phone  [phone]    Fax  [phone]    Contact Name  JANIS [facility]    Service Information  Service Type  2 - Surgical    Place of Service  22 - [facility]    Service From - To Date  2024-11-11 - 2024-12-30    Quantity  1 Units  Procedure Code 1 (CPT/HCPCS)  72931 - LAP ING HERNIA REPAIR INIT    Quantity  1 Units

## 2024-10-30 ENCOUNTER — LABORATORY ENCOUNTER (OUTPATIENT)
Dept: LAB | Age: 84
End: 2024-10-30
Attending: SURGERY
Payer: MEDICARE

## 2024-10-30 ENCOUNTER — EKG ENCOUNTER (OUTPATIENT)
Dept: LAB | Age: 84
End: 2024-10-30
Attending: SURGERY
Payer: MEDICARE

## 2024-10-30 DIAGNOSIS — Z01.818 PRE-OP TESTING: ICD-10-CM

## 2024-10-30 LAB
ANION GAP SERPL CALC-SCNC: 5 MMOL/L (ref 0–18)
ATRIAL RATE: 182 BPM
BUN BLD-MCNC: 13 MG/DL (ref 9–23)
CALCIUM BLD-MCNC: 9.7 MG/DL (ref 8.7–10.4)
CHLORIDE SERPL-SCNC: 101 MMOL/L (ref 98–112)
CO2 SERPL-SCNC: 27 MMOL/L (ref 21–32)
CREAT BLD-MCNC: 0.77 MG/DL
EGFRCR SERPLBLD CKD-EPI 2021: 89 ML/MIN/1.73M2 (ref 60–?)
GLUCOSE BLD-MCNC: 79 MG/DL (ref 70–99)
OSMOLALITY SERPL CALC.SUM OF ELEC: 275 MOSM/KG (ref 275–295)
POTASSIUM SERPL-SCNC: 4.2 MMOL/L (ref 3.5–5.1)
Q-T INTERVAL: 462 MS
QRS DURATION: 86 MS
QTC CALCULATION (BEZET): 421 MS
R AXIS: -27 DEGREES
SODIUM SERPL-SCNC: 133 MMOL/L (ref 136–145)
T AXIS: 69 DEGREES
VENTRICULAR RATE: 50 BPM

## 2024-10-30 PROCEDURE — 36415 COLL VENOUS BLD VENIPUNCTURE: CPT

## 2024-10-30 PROCEDURE — 80048 BASIC METABOLIC PNL TOTAL CA: CPT

## 2024-10-30 PROCEDURE — 93010 ELECTROCARDIOGRAM REPORT: CPT | Performed by: INTERNAL MEDICINE

## 2024-10-30 PROCEDURE — 93005 ELECTROCARDIOGRAM TRACING: CPT

## 2024-10-31 ENCOUNTER — OFFICE VISIT (OUTPATIENT)
Dept: INTERNAL MEDICINE CLINIC | Facility: CLINIC | Age: 84
End: 2024-10-31
Payer: MEDICARE

## 2024-10-31 VITALS
SYSTOLIC BLOOD PRESSURE: 140 MMHG | WEIGHT: 146 LBS | TEMPERATURE: 98 F | HEART RATE: 65 BPM | BODY MASS INDEX: 22.91 KG/M2 | OXYGEN SATURATION: 98 % | DIASTOLIC BLOOD PRESSURE: 70 MMHG | RESPIRATION RATE: 16 BRPM | HEIGHT: 67 IN

## 2024-10-31 DIAGNOSIS — G47.9 SLEEP DISORDER: ICD-10-CM

## 2024-10-31 DIAGNOSIS — Z01.818 PREOP EXAM FOR INTERNAL MEDICINE: Primary | ICD-10-CM

## 2024-10-31 DIAGNOSIS — I48.20 CHRONIC ATRIAL FIBRILLATION (HCC): Chronic | ICD-10-CM

## 2024-10-31 PROBLEM — K40.20 NON-RECURRENT BILATERAL INGUINAL HERNIA WITHOUT OBSTRUCTION OR GANGRENE: Chronic | Status: ACTIVE | Noted: 2024-05-21

## 2024-10-31 PROCEDURE — 3008F BODY MASS INDEX DOCD: CPT | Performed by: INTERNAL MEDICINE

## 2024-10-31 PROCEDURE — 3077F SYST BP >= 140 MM HG: CPT | Performed by: INTERNAL MEDICINE

## 2024-10-31 PROCEDURE — 99214 OFFICE O/P EST MOD 30 MIN: CPT | Performed by: INTERNAL MEDICINE

## 2024-10-31 PROCEDURE — 3078F DIAST BP <80 MM HG: CPT | Performed by: INTERNAL MEDICINE

## 2024-10-31 PROCEDURE — 1159F MED LIST DOCD IN RCRD: CPT | Performed by: INTERNAL MEDICINE

## 2024-10-31 NOTE — PATIENT INSTRUCTIONS
Patient will go for sleep study he failed the bang questionnaire.  Patient already been cleared by his cardiologist and apparently the letter has been sent to the surgeon directly

## 2024-10-31 NOTE — PROGRESS NOTES
Cecil DAVIS 1940 is a 84 year old male.    Chief Complaint   Patient presents with    Pre-Op Exam     2024 for hernia surgery       HPI:   He  has had previous anesthesia:  yes  Previous complications:  none  Current Outpatient Medications   Medication Sig Dispense Refill    tamsulosin 0.4 MG Oral Cap Take 1 capsule (0.4 mg total) by mouth After dinner. 90 capsule 0    NIFEdipine ER 30 MG Oral Tablet 24 Hr Take 1 tablet (30 mg total) by mouth at bedtime. 90 tablet 3    ELIQUIS 5 MG Oral Tab Take 1 tablet (5 mg total) by mouth 2 (two) times daily.      Calcium 500 MG Oral Tab Take 500 mg by mouth in the morning and 500 mg before bedtime. 30 tablet 0    enalapril 10 MG Oral Tab Take 1 tablet (10 mg total) by mouth 2 (two) times daily.  0    atenolol 50 MG Oral Tab Take 1 tablet (50 mg total) by mouth every morning.  0    hydroCHLOROthiazide 25 MG Oral Tab Take 1 tablet (25 mg total) by mouth every other day. 30 tablet 3    OMEPRAZOLE 20 MG Oral Capsule Delayed Release TAKE 1 CAPSULE EVERY DAY (Patient taking differently: as needed.) 90 capsule 0    SYNTHROID 112 MCG Oral Tab Take 1 tablet (112 mcg total) by mouth daily. 90 tablet 3    Multiple Vitamins-Minerals (MULTI-VITAMIN/MINERALS) Oral Tab Take 1 tablet by mouth daily.      Cholecalciferol (VITAMIN D) 2000 UNITS Oral Cap Take 1 capsule (2,000 Units total) by mouth daily.        Past Medical History:    Arrhythmia    AFIB    Arthritis    knee artorits    Atherosclerosis of aorta (HCC)    Ct chest 2011    Blood disorder    THALASSEMIA MINOR    BPH (benign prostatic hyperplasia)    CANCER    Cancer (HCC)    Disorder of thyroid    Diverticulosis    DJD (degenerative joint disease)    Dr. Schafer    Exposure to medical diagnostic radiation    Hearing impaired person, bilateral    High blood pressure    Lumbar radiculopathy    Malignant neoplasm of thyroid gland (HCC)    Osteoarthrosis, unspecified whether generalized or localized,  unspecified site    Osteopenia of thigh    Osteopenia of thigh, unspecified laterality    Prostate hypertrophy    Thalassemia minor    Thyroid cancer (HCC)    Dr. Marin at [facility]    Unspecified essential hypertension    Varicocele      Social History:  Social History     Socioeconomic History    Marital status:    Tobacco Use    Smoking status: Never    Smokeless tobacco: Never   Vaping Use    Vaping status: Never Used   Substance and Sexual Activity    Alcohol use: No    Drug use: No   Other Topics Concern    Caffeine Concern No    Exercise No    Seat Belt No    Special Diet No    Stress Concern No    Weight Concern No        REVIEW OF SYSTEMS:     General/Constitutional:   General able to do usual activities, good exercise tolerance, good general state of health, no fatigue, no fever, no weakness, no weight loss or gain .   HEENT/Neck:   Head no headache, no dizziness, no lightheadedness. Eyes no complaints. Ears no complaints. Nose and Sinuses no complaints. Mouth and Pharynx no sore throats, no hoarseness. Neck no lumps, no goiter, no neck stiffness or pain.   Endocrine:   Diabetes none. Thyroid disorder hypothyroid   Respiratory:   Patient denies chest pain, cough, BARROW (dyspnea on exertion), chest congestion, blood-tinged sputum/wheezing. Breathing normal pattern .   Cardiovascular:   Patient denies chest pain, shortness of breath/rheumatic fever/murmur/dizzziness cholesterol normal. Leg edema none. Orthopnea none. Palpitations none. PND (paroxsymal nocturnal dyspnea) none.  See his cardiologist Dr. Germain who has sent clearance Recent stress thallium was negative.  Gastrointestinal:   Patient denies abdominal pain, blood in stool, constipation, diarrhea, difficulty swallowing, change in stools, heartburn, nausea, vomiting no weight changes noted   Hematology:   Patient denies abnormal bleeding, easy bleeding, easy bruising. Enlarged lymph nodes none.   Neurologic:   Patient denies dizziness, fainting, loss of  consciousness, weakness.   Psychiatric:   Anxiety no.   Sleep disorder none patient confesses to none however on leading questionnaire after he failed the bang questionnaire the patient does state that he does snore and apparently wife says that he does not breathe well        EXAM:   /70 (BP Location: Left arm, Patient Position: Sitting, Cuff Size: adult)   Pulse 65   Temp 98 °F (36.7 °C) (Temporal)   Resp 16   Ht 5' 7\" (1.702 m)   Wt 146 lb (66.2 kg)   SpO2 98%   BMI 22.87 kg/m²   GENERAL:   Build: normal .   General Appearance: alert and oriented, pleasant.   HEENT:   Ear canals: normal.   EOM: within normal limit-conjunctiva normal.   Head: normocephalic.   Nasal septum: midline.   Nose: unremarkable.   Oral cavity: normal.   Pupils: normal, bilaterally .   Sclera: normal.   Turbinates: normal.   NECK:   Carotid bruit: none.   Cervical lymph nodes: unremarkable.   JVD: none.   Range of Motion: normal.   Thyroid: unremarkable.   HEART:   Clicks: absent .   Edema: none visible .   Heart sounds: normal.   Murmurs: none.   Rhythm: Irregular  LUNGS:   Auscultation: clear .   Chest Shape: normal .   Percussion: normal.   Rales: no .   Respiratory effort: normal .   Rhonchi: no.   Wheezes: no.   ABDOMEN:   General: normal.   Liver, Spleen: non-enlarged.   EXTREMITIES:   Clubbing: none.   Cyanosis: absent .   Edema: none.   Pulses: present, bilateral.   NEUROLOGICAL:   Babinski: negative/all reflexes are normal.   Cerebellar Testing grossly/intact: yes.   Gait: normal.   Motor: power-normal/tone -normal/co-ordination normal/wasting -none/involuntary movements -none.   Sensory: normal sensation to all modalities.             ASSESSMENT AND PLAN:   Cecil was seen today for pre-op exam.    Diagnoses and all orders for this visit:    Preop exam for internal medicine    Sleep disorder  -     Diagnostic Sleep Study-split night PAP implemented if criteria met  -     General sleep study; Future    Chronic atrial  fibrillation (HCC)  -     Diagnostic Sleep Study-split night PAP implemented if criteria met  -     General sleep study; Future         Per protocol lab work is been reviewed.  Patient now cleared for surgery.  Patient already has had cardiac clearance from his cardiologist    Patient Instructions   Patient will go for sleep study he failed the bang questionnaire.  Patient already been cleared by his cardiologist and apparently the letter has been sent to the surgeon directly   The patient indicates understanding of these issues and agrees to the plan.  The patient is asked to Return in about 3 months (around 1/31/2025).  .      Neal Cleveland MD

## 2024-11-11 ENCOUNTER — ANESTHESIA EVENT (OUTPATIENT)
Dept: SURGERY | Facility: HOSPITAL | Age: 84
End: 2024-11-11
Payer: MEDICARE

## 2024-11-11 ENCOUNTER — ANESTHESIA (OUTPATIENT)
Dept: SURGERY | Facility: HOSPITAL | Age: 84
End: 2024-11-11
Payer: MEDICARE

## 2024-11-11 ENCOUNTER — HOSPITAL ENCOUNTER (OUTPATIENT)
Facility: HOSPITAL | Age: 84
Setting detail: HOSPITAL OUTPATIENT SURGERY
Discharge: HOME | End: 2024-11-11
Attending: SURGERY | Admitting: SURGERY
Payer: MEDICARE

## 2024-11-11 VITALS
DIASTOLIC BLOOD PRESSURE: 75 MMHG | SYSTOLIC BLOOD PRESSURE: 148 MMHG | OXYGEN SATURATION: 94 % | RESPIRATION RATE: 18 BRPM | TEMPERATURE: 98 F | HEIGHT: 67 IN | HEART RATE: 84 BPM | BODY MASS INDEX: 23.04 KG/M2 | WEIGHT: 146.81 LBS

## 2024-11-11 DIAGNOSIS — Z01.818 PRE-OP TESTING: Primary | ICD-10-CM

## 2024-11-11 DIAGNOSIS — G89.18 POSTOPERATIVE PAIN: ICD-10-CM

## 2024-11-11 PROCEDURE — 49650 LAP ING HERNIA REPAIR INIT: CPT | Performed by: SURGERY

## 2024-11-11 PROCEDURE — 49650 LAP ING HERNIA REPAIR INIT: CPT

## 2024-11-11 PROCEDURE — 0YUA4JZ SUPPLEMENT BILATERAL INGUINAL REGION WITH SYNTHETIC SUBSTITUTE, PERCUTANEOUS ENDOSCOPIC APPROACH: ICD-10-PCS | Performed by: SURGERY

## 2024-11-11 PROCEDURE — 8E0W4CZ ROBOTIC ASSISTED PROCEDURE OF TRUNK REGION, PERCUTANEOUS ENDOSCOPIC APPROACH: ICD-10-PCS | Performed by: SURGERY

## 2024-11-11 DEVICE — LAPAROSCOPIC SELF-FIXATING MESH POLYESTER WITH POLYLACTIC ACID GRIPS AND COLLAGEN FILM
Type: IMPLANTABLE DEVICE | Site: INGUINAL | Status: FUNCTIONAL
Brand: PROGRIP

## 2024-11-11 RX ORDER — SENNA AND DOCUSATE SODIUM 50; 8.6 MG/1; MG/1
1 TABLET, FILM COATED ORAL DAILY
Qty: 30 TABLET | Refills: 0 | Status: SHIPPED | OUTPATIENT
Start: 2024-11-11 | End: 2024-12-11

## 2024-11-11 RX ORDER — HYDROCODONE BITARTRATE AND ACETAMINOPHEN 5; 325 MG/1; MG/1
1 TABLET ORAL ONCE AS NEEDED
Status: COMPLETED | OUTPATIENT
Start: 2024-11-11 | End: 2024-11-11

## 2024-11-11 RX ORDER — SODIUM CHLORIDE, SODIUM LACTATE, POTASSIUM CHLORIDE, CALCIUM CHLORIDE 600; 310; 30; 20 MG/100ML; MG/100ML; MG/100ML; MG/100ML
INJECTION, SOLUTION INTRAVENOUS CONTINUOUS
Status: DISCONTINUED | OUTPATIENT
Start: 2024-11-11 | End: 2024-11-11

## 2024-11-11 RX ORDER — HYDROCODONE BITARTRATE AND ACETAMINOPHEN 5; 325 MG/1; MG/1
2 TABLET ORAL ONCE AS NEEDED
Status: COMPLETED | OUTPATIENT
Start: 2024-11-11 | End: 2024-11-11

## 2024-11-11 RX ORDER — ROCURONIUM BROMIDE 10 MG/ML
INJECTION, SOLUTION INTRAVENOUS AS NEEDED
Status: DISCONTINUED | OUTPATIENT
Start: 2024-11-11 | End: 2024-11-11 | Stop reason: SURG

## 2024-11-11 RX ORDER — OXYCODONE HYDROCHLORIDE 5 MG/1
5 TABLET ORAL EVERY 6 HOURS PRN
Qty: 15 TABLET | Refills: 0 | Status: SHIPPED | OUTPATIENT
Start: 2024-11-11

## 2024-11-11 RX ORDER — HYDROMORPHONE HYDROCHLORIDE 1 MG/ML
0.4 INJECTION, SOLUTION INTRAMUSCULAR; INTRAVENOUS; SUBCUTANEOUS EVERY 5 MIN PRN
Status: DISCONTINUED | OUTPATIENT
Start: 2024-11-11 | End: 2024-11-11

## 2024-11-11 RX ORDER — HEPARIN SODIUM 5000 [USP'U]/ML
5000 INJECTION, SOLUTION INTRAVENOUS; SUBCUTANEOUS ONCE
Status: COMPLETED | OUTPATIENT
Start: 2024-11-11 | End: 2024-11-11

## 2024-11-11 RX ORDER — LIDOCAINE HYDROCHLORIDE 10 MG/ML
INJECTION, SOLUTION EPIDURAL; INFILTRATION; INTRACAUDAL; PERINEURAL AS NEEDED
Status: DISCONTINUED | OUTPATIENT
Start: 2024-11-11 | End: 2024-11-11 | Stop reason: SURG

## 2024-11-11 RX ORDER — NALOXONE HYDROCHLORIDE 0.4 MG/ML
0.08 INJECTION, SOLUTION INTRAMUSCULAR; INTRAVENOUS; SUBCUTANEOUS AS NEEDED
Status: DISCONTINUED | OUTPATIENT
Start: 2024-11-11 | End: 2024-11-11

## 2024-11-11 RX ORDER — BUPIVACAINE HYDROCHLORIDE 2.5 MG/ML
INJECTION, SOLUTION EPIDURAL; INFILTRATION; INTRACAUDAL AS NEEDED
Status: DISCONTINUED | OUTPATIENT
Start: 2024-11-11 | End: 2024-11-11 | Stop reason: HOSPADM

## 2024-11-11 RX ORDER — HYDROMORPHONE HYDROCHLORIDE 1 MG/ML
0.6 INJECTION, SOLUTION INTRAMUSCULAR; INTRAVENOUS; SUBCUTANEOUS EVERY 5 MIN PRN
Status: DISCONTINUED | OUTPATIENT
Start: 2024-11-11 | End: 2024-11-11

## 2024-11-11 RX ORDER — LABETALOL HYDROCHLORIDE 5 MG/ML
5 INJECTION, SOLUTION INTRAVENOUS EVERY 5 MIN PRN
Status: DISCONTINUED | OUTPATIENT
Start: 2024-11-11 | End: 2024-11-11

## 2024-11-11 RX ORDER — ACETAMINOPHEN 500 MG
1000 TABLET ORAL ONCE AS NEEDED
Status: COMPLETED | OUTPATIENT
Start: 2024-11-11 | End: 2024-11-11

## 2024-11-11 RX ORDER — APIXABAN 5 MG/1
5 TABLET, FILM COATED ORAL 2 TIMES DAILY
Status: SHIPPED | COMMUNITY
Start: 2024-11-13

## 2024-11-11 RX ORDER — HYDROMORPHONE HYDROCHLORIDE 1 MG/ML
0.2 INJECTION, SOLUTION INTRAMUSCULAR; INTRAVENOUS; SUBCUTANEOUS EVERY 5 MIN PRN
Status: DISCONTINUED | OUTPATIENT
Start: 2024-11-11 | End: 2024-11-11

## 2024-11-11 RX ORDER — DEXAMETHASONE SODIUM PHOSPHATE 4 MG/ML
VIAL (ML) INJECTION AS NEEDED
Status: DISCONTINUED | OUTPATIENT
Start: 2024-11-11 | End: 2024-11-11 | Stop reason: SURG

## 2024-11-11 RX ORDER — MEPERIDINE HYDROCHLORIDE 25 MG/ML
12.5 INJECTION INTRAMUSCULAR; INTRAVENOUS; SUBCUTANEOUS AS NEEDED
Status: DISCONTINUED | OUTPATIENT
Start: 2024-11-11 | End: 2024-11-11

## 2024-11-11 RX ORDER — METOCLOPRAMIDE HYDROCHLORIDE 5 MG/ML
10 INJECTION INTRAMUSCULAR; INTRAVENOUS EVERY 8 HOURS PRN
Status: DISCONTINUED | OUTPATIENT
Start: 2024-11-11 | End: 2024-11-11

## 2024-11-11 RX ORDER — HYDROMORPHONE HYDROCHLORIDE 1 MG/ML
INJECTION, SOLUTION INTRAMUSCULAR; INTRAVENOUS; SUBCUTANEOUS
Status: COMPLETED
Start: 2024-11-11 | End: 2024-11-11

## 2024-11-11 RX ORDER — ONDANSETRON 2 MG/ML
4 INJECTION INTRAMUSCULAR; INTRAVENOUS EVERY 6 HOURS PRN
Status: DISCONTINUED | OUTPATIENT
Start: 2024-11-11 | End: 2024-11-11

## 2024-11-11 RX ORDER — ACETAMINOPHEN 500 MG
1000 TABLET ORAL ONCE
Status: DISCONTINUED | OUTPATIENT
Start: 2024-11-11 | End: 2024-11-11 | Stop reason: HOSPADM

## 2024-11-11 RX ORDER — ONDANSETRON 2 MG/ML
INJECTION INTRAMUSCULAR; INTRAVENOUS AS NEEDED
Status: DISCONTINUED | OUTPATIENT
Start: 2024-11-11 | End: 2024-11-11 | Stop reason: SURG

## 2024-11-11 RX ADMIN — ROCURONIUM BROMIDE 20 MG: 10 INJECTION, SOLUTION INTRAVENOUS at 16:28:00

## 2024-11-11 RX ADMIN — ONDANSETRON 4 MG: 2 INJECTION INTRAMUSCULAR; INTRAVENOUS at 16:54:00

## 2024-11-11 RX ADMIN — LIDOCAINE HYDROCHLORIDE 50 MG: 10 INJECTION, SOLUTION EPIDURAL; INFILTRATION; INTRACAUDAL; PERINEURAL at 15:27:00

## 2024-11-11 RX ADMIN — ROCURONIUM BROMIDE 50 MG: 10 INJECTION, SOLUTION INTRAVENOUS at 15:28:00

## 2024-11-11 RX ADMIN — DEXAMETHASONE SODIUM PHOSPHATE 4 MG: 4 MG/ML VIAL (ML) INJECTION at 15:36:00

## 2024-11-11 RX ADMIN — SODIUM CHLORIDE, SODIUM LACTATE, POTASSIUM CHLORIDE, CALCIUM CHLORIDE: 600; 310; 30; 20 INJECTION, SOLUTION INTRAVENOUS at 17:12:00

## 2024-11-11 RX ADMIN — SODIUM CHLORIDE, SODIUM LACTATE, POTASSIUM CHLORIDE, CALCIUM CHLORIDE: 600; 310; 30; 20 INJECTION, SOLUTION INTRAVENOUS at 15:21:00

## 2024-11-11 NOTE — INTERVAL H&P NOTE
Pre-op Diagnosis: Bilateral inguinal hernia without obstruction or gangrene, recurrence not specified [K40.20]    The above referenced H&P was reviewed by You Rosario MD on 11/11/2024, the patient was examined and no significant changes have occurred in the patient's condition since the H&P was performed.  I discussed with the patient and/or legal representative the potential benefits, risks and side effects of this procedure; the likelihood of the patient achieving goals; and potential problems that might occur during recuperation.  I discussed reasonable alternatives to the procedure, including risks, benefits and side effects related to the alternatives and risks related to not receiving this procedure.  We will proceed with procedure as planned.

## 2024-11-11 NOTE — BRIEF OP NOTE
Pre-Operative Diagnosis: Bilateral inguinal hernia without obstruction or gangrene, recurrence not specified [K40.20]     Post-Operative Diagnosis: Bilateral inguinal hernia without obstruction or gangrene, recurrence not specified [K40.20]      Procedure Performed:   ROBOTIC BILATERAL INGUINAL HERNIA REPAIR    Surgeons and Role:     * You Rosario MD - Primary    Assistant(s):  PA: Melissa Mendez PA-C     Surgical Findings: as above     Specimen: none     Estimated Blood Loss: Blood Output: 2 mL (11/11/2024  4:54 PM)      Dictation Number:      You Rosario MD  11/11/2024  4:58 PM

## 2024-11-11 NOTE — OPERATIVE REPORT
OPERATIVE REPORT    PREOPERATIVE DIAGNOSIS:  Bilateral inguinal hernia.  POSTOPERATIVE DIAGNOSIS:  Bilateral incarcerated inguinal hernia.  PROCEDURE PERFORMED: Robotic bilateral incarcerated inguinal hernia repair with mesh ( ProGrip mesh used)      ASSISTANT: Ms Andrea PA-C    ANESTHESIA: General endotracheal anesthesia.     Anesthesiologist.: Gladis Reed MD  CRNA.: Savita Calvin CRNA    BLOOD LOSS: Less than 5 mL.    COMPLICATIONS: None apparent.    IMPLANTS: Mesh as above.    FINDINGS: Bilateral incarcerated inguinal hernia.  Right is larger than left.     COMPLICATIONS: None apparent.    DISPOSITION: The patient was awakened from anesthesia and brought to the recovery room in stable condition. He tolerated the procedure without apparent complication. Needle, sponge, instrument counts were correct at the end of procedure. Please note preprocedure antibiotic and DVT prophylaxis administered per protocol and a time-out were performed prior to initiating the procedure identifying the correct patient, procedure, surgeon, and sites of surgery. I indicated the sites of surgery in the preoperative holding area and the patient concurred.    INDICATIONS: Please see the preprocedure history and physical. Briefly, the patient is a  84 year old male with a painful bulge of the inguinal region. Physical examination is consistent with inguinal hernia. The risks, benefits, and alternatives of robotic inguinal hernia repair were explained to the patient.  The risks explained included but were not limited to bleeding, infection, recurrence, injury to adjacent organs and structures, injury to the vas deferens, injury to the blood supply of the testicle with potential for testicular ischemia or atrophy. The need for therapeutic, diagnostic for surgical intervention was also discussed with the patient. The patient voiced understanding. All pertinent questions were answered to the patient's satisfaction after which the  patient provided willing and informed consent to proceed with surgery.    PROCEDURE: The patient was brought to the operating room, and after induction of general endotracheal anesthesia, the anesthesiologist performed an TAP block.  Next, the abdomen was prepped and draped in usual sterile fashion.  The patient was placed in Trendelenburg position. The umbilicus was grasped, elevated with an Allis clamp and 2 perforating towel clips, and an 8-mm incision was created cephalad to the umbilicus. Through this a Veress needle was introduced into the abdomen and pneumoperitoneum was established in usual manner. An 8-mm DaVinci Visi-Port trocar was then placed under direct vision followed by a laparoscope. Diagnostic survey of the abdomen revealed no other acute pathology or iatrogenic injury. Attention was turned to the inguinal region where the hernia defects were identified. At this point, two 8 mm trocars were placed on either side of the abdomen in the midclavicular line under direct vision. Next, the robot was then docked and instruments placed under direct vision. Repair of the hernias was completed in similar manner bilatearally. The peritoneum was grasped, retracted, and divided . Once the peritoneum was incised, blunt and sharp dissection was used with judicious use of electrocautery to achieve hemostasis. Medial to lateral dissection was accomplished identifying initially the pubic tubercle and Chuy's ligament and the soft tissues were dissected laterally to create a sufficient pocket to accommodate mesh. Next, the hernia sac was grasped and retracted. Blunt and sharp dissection technique was utilized to reduce the hernia sac into the abdominal cavity. Once this was accomplished, a ProGrip  mesh was placed in the inguinal region. Once the mesh was in place, the peritoneal flap was then reapproximated in anatomic position and secured using running 2-0 V-Loc suture. At the completion of the operation, all  instruments were removed. Needle, sponge, instrument counts were correct. Pneumoperitoneum was released and all instruments had been removed under direct vision as well.  The skin incisions are closed with 4-0 Monocryl suture. Dermabond was used to seal the incisions. The anesthesiologist performed a TAP block. The patient was awakened from anesthesia, brought to the recovery room in stable condition having tolerated procedure without apparent complication. Needle, sponge, instrument counts correct at the end of procedure and operative findings were discussed with the patient's family.        You Rosario MD FACS  Trauma Medical Director, [facility]    The 21st Century Cures Act makes medical notes like these available to patients in the interest of transparency. Please be advised this is a medical document. Medical documents are intended to carry relevant information, facts as evident, and the clinical opinion of the practitioner. The medical note is intended as peer to peer communication and may appear blunt or direct. It is written in medical language and may contain abbreviations or verbiage that are unfamiliar.    This note was prepared using Dragon Medical voice recognition dictation software. As a result, errors may occur. When identified, these errors have been corrected. While every attempt is made to correct errors during dictation, discrepancies may still exist.

## 2024-11-11 NOTE — DISCHARGE INSTRUCTIONS
Home Care Instructions  Robotic Inguinal Hernia  Dr You Rosario    MEDICATIONS  For post-operative pain control the medications are usually Vicodin or Norco. These are narcotics and are best taken by starting with one tablet and repeating every four to six hours as needed. If the patient does not feel they need the narcotics they shouldn’t take them. If the pain is severe the patient may take up to two pills every four hours. If a minor supplement is necessary in addition to the narcotics do not overlap with Tylenol (any product with acetaminophen) as both Vicodin and Norco contain Tylenol. Please supplement with Advil (ibuprofen) or Motrin. Please ask your surgeon before resuming blood thinners such as aspirin, plavix or coumadin. All other home medications may be resumed as scheduled.    DIET  The patient may resume a general diet immediately. This is not a good time to eat excessively. The patient should eat in moderation and stick with foods the patient feels are easy to digest. There should be no alcohol consumption in the immediate recover time period or within six hours of taking narcotics.    WOUND CARE  The top dressing may be removed the day after surgery. This includes the gauze, tape and band-aids if they are present. Do not remove the steri-strips or butterfly tapes that are white and adherent to the skin. The steri-strips will eventually peel up at the ends and at this point they may be removed. This is usually seven to ten days after surgery. The patient may shower the day after surgery. There is no need to cover the incisions and all top gauze type dressing should be removed prior to showering. Soap can get on the wounds but do not scrub over the wounds. No hair dye or chemicals of any kind should get in the wounds. Avoid tub baths for two weeks. If visible sutures or staples are present they will be removed in the office by the surgeon or nurse. Most wounds will be closed with dissolving suture  underneath the skin. These sutures will dissolve on their own.    ACTIVITY  Every day the patient should be up, showered and dressed. Each day the patient should be up and around the house. The patient should not lie in bed and should not stay in pajamas. We count on the patient being up, coughing, walking and deep breathing to avoid pneumonia and blood clots in the legs. Once a day the patient should get out of the house and go shopping, go to the mall, the [employer] store, the movies or a restaurant. The patient may ride in a car but should not drive the car for at least one week. Patients should be off narcotics for at least 8 hours prior to being the . The average time off work is 10 to 14 days; most adults will be seeing the surgeon prior to returning to work. Patients may go up and down stairs and lift up to five pounds but no bending, pushing or pulling. Nothing called work or exercise until the follow up visit. No ‘stair-master’ poser walking, jogging or workout until the follow up visit. Patients should seek further activity limits at the time of their appointment. If the patient is unable to urinate and feels a painful and full bladder call us immediately.    APPOINTMENT  Please call our office today for an appointment within five to ten days of discharge Any fever greater than 100.5, chills, nausea, vomiting, or severe diarrhea please call our office. If the wound turns red, hot, swollen, becomes increasingly painful, or drains pus call us immediately at [phone]. For life threatening emergencies call [phone]. For non-emergent care please call our office after 9:30 a.m. Monday through Saturday. The number listed above is our office number, our phone automatically switches to out answering service if we are not there. Please do not use the emergency room for nonurgent care.      Thank you for entrusting me with your care.         You have been given a prescription for oxycodone  Norco was given to  you at: 6:30PM  Next dose no sooner than four hours after first dose  Take this medication as directed    Oxycodone is a Narcotic and can be constipating or upset your stomach  Don't take oxycodone on an empty stomach  Drink plenty of water  Alcoholic beverages should be avoided while taking narcotics

## 2024-11-11 NOTE — ANESTHESIA PREPROCEDURE EVALUATION
PRE-OP EVALUATION    Patient Name: Cecil Aguayo    Admit Diagnosis: Bilateral inguinal hernia without obstruction or gangrene, recurrence not specified [K40.20]    Pre-op Diagnosis: Bilateral inguinal hernia without obstruction or gangrene, recurrence not specified [K40.20]    ROBOTIC BILATERAL INGUINAL HERNIA REPAIR    Anesthesia Procedure: ROBOTIC BILATERAL INGUINAL HERNIA REPAIR (Bilateral)    Surgeons and Role:     * You Rosario MD - Primary    Pre-op vitals reviewed.  Temp: 98.2 °F (36.8 °C)  Pulse: 53  Resp: 18  BP: 145/78  SpO2: 100 %  Body mass index is 23 kg/m².    Current medications reviewed.  Hospital Medications:   [Transfer Hold] acetaminophen (Tylenol Extra Strength) tab 1,000 mg  1,000 mg Oral Once    lactated ringers infusion   Intravenous Continuous    [COMPLETED] heparin (Porcine) 5000 UNIT/ML injection 5,000 Units  5,000 Units Subcutaneous Once    ceFAZolin (Ancef) 2g in 10mL IV syringe premix  2 g Intravenous Once       Outpatient Medications:   Prescriptions Prior to Admission[1]    Allergies: Bactrim [sulfamethoxazole w/trimethoprim]      Anesthesia Evaluation    Patient summary reviewed.    Anesthetic Complications  (-) history of anesthetic complications         GI/Hepatic/Renal    Negative GI/hepatic/renal ROS.                             Cardiovascular    Negative cardiovascular ROS.              (+) hypertension                                     Endo/Other    Negative endo/other ROS.                              Pulmonary    Negative pulmonary ROS.                       Neuro/Psych    Negative neuro/psych ROS.                              Past Surgical History:   Procedure Laterality Date    Cataract  2009    bilateral eyes     Cholecystectomy      Colonoscopy      Injection, anesthetic/steroid, transforaminal epidural; lumbar/sacral, add'l level  06/21/2013    Procedure: TRANSFORAMINAL EPIDURAL - LUMBAR;  Surgeon: Torres Cruz MD;  Location: [facility]    Injection, anesthetic/steroid, transforaminal epidural; lumbar/sacral, add'l level  08/15/2013    Procedure: TRANSFORAMINAL EPIDURAL - LUMBAR;  Surgeon: Torres Cruz MD;  Location: [facility]    Injection, anesthetic/steroid, transforaminal epidural; lumbar/sacral, single level  05/24/2013    Procedure: TRANSFORAMINAL EPIDURAL - LUMBAR;  Surgeon: Torres Cruz MD;  Location: [facility]    Injection, anesthetic/steroid, transforaminal epidural; lumbar/sacral, single level  06/21/2013    Procedure: TRANSFORAMINAL EPIDURAL - LUMBAR;  Surgeon: Torres Cruz MD;  Location: [facility]    Injection, anesthetic/steroid, transforaminal epidural; lumbar/sacral, single level  08/15/2013    Procedure: TRANSFORAMINAL EPIDURAL - LUMBAR;  Surgeon: Torres Cruz MD;  Location: [facility]    Knee replacement surgery Right 12/04/2024    Other surgical history      Thyroid Cancer     Other surgical history  2000    UNSPECIFIED SURGERY     Other surgical history  2005    Left salivary gland, removal of cancer      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     Lab Results   Component Value Date     (L) 10/30/2024    K 4.2 10/30/2024     10/30/2024    CO2 27.0 10/30/2024    BUN 13 10/30/2024    CREATSERUM 0.77 10/30/2024    GLU 79 10/30/2024    CA 9.7 10/30/2024            Airway      Mallampati: I  Mouth opening: >3 FB  TM distance: > 6 cm  Neck ROM: full  Cardiovascular    Cardiovascular exam normal.  Rhythm: regular  Rate: normal     Dental      Dental appliance(s): partials       Pulmonary    Pulmonary exam normal.                 Other findings  Many missing teeth, none loose          ASA: 2   Plan: general  NPO status verified and patient meets guidelines.    Post-procedure pain management plan discussed with surgeon and patient.    Comment: Options, risks and benefits of anesthesia as outlined in the anesthesia consent were reviewed with the patient. Risks and benefits of GA including sore throat, allergy, nausea, vomiting, dental trauma, pain management modalities were all discussed. Particularly the risk of dental trauma with weakened teeth or crowns, partials, fillings and any non natural teeth due to instrumentation of oral cavity and airway. Patient understands risks and verbally agreed to proceed. All questions answered.The consent was signed without further questions.        Plan/risks discussed with: patient            Present on Admission:  **None**           [1]   Medications Prior to Admission   Medication Sig Dispense Refill Last Dose/Taking    tamsulosin 0.4 MG Oral Cap Take 1 capsule (0.4 mg total) by mouth After dinner. 90 capsule 0 Past Week    NIFEdipine ER 30 MG Oral Tablet 24 Hr Take 1 tablet (30 mg total) by mouth at bedtime. 90 tablet 3 11/10/2024    ELIQUIS 5 MG Oral Tab Take 1 tablet (5 mg total) by mouth 2 (two) times daily.   11/6/2024    Calcium 500 MG Oral Tab Take 500 mg by mouth in the morning and 500 mg before bedtime. 30 tablet 0 Taking    enalapril 10 MG Oral Tab Take 1 tablet (10 mg total) by mouth 2 (two) times daily.  0 11/10/2024    atenolol 50 MG Oral Tab Take 1 tablet (50 mg total) by mouth every morning.  0 11/11/2024    hydroCHLOROthiazide 25 MG Oral Tab Take 1 tablet (25 mg total) by mouth every other day. 30 tablet 3 11/10/2024    OMEPRAZOLE 20 MG Oral Capsule Delayed Release TAKE 1 CAPSULE EVERY DAY (Patient taking  differently: as needed.) 90 capsule 0 11/10/2024    SYNTHROID 112 MCG Oral Tab Take 1 tablet (112 mcg total) by mouth daily. 90 tablet 3 11/11/2024    Multiple Vitamins-Minerals (MULTI-VITAMIN/MINERALS) Oral Tab Take 1 tablet by mouth daily.   Past Week    Cholecalciferol (VITAMIN D) 2000 UNITS Oral Cap Take 1 capsule (2,000 Units total) by mouth daily.   11/10/2024

## 2024-11-11 NOTE — ANESTHESIA PROCEDURE NOTES
Airway  Date/Time: 11/11/2024 3:30 PM  Urgency: elective    Airway not difficult    General Information and Staff    Patient location during procedure: OR  Anesthesiologist: Gladis Reed MD  Resident/CRNA: Savita Calvin CRNA  Performed: CRNA   Performed by: Savita Calvin CRNA  Authorized by: Gladis Reed MD      Indications and Patient Condition  Indications for airway management: anesthesia  Sedation level: deep  Preoxygenated: yes  Patient position: sniffing  Mask difficulty assessment: 1 - vent by mask    Final Airway Details  Final airway type: endotracheal airway      Successful airway: ETT  Cuffed: yes   Successful intubation technique: direct laryngoscopy  Endotracheal tube insertion site: oral  Blade: Kimberly  Blade size: #3  ETT size (mm): 7.5    Cormack-Lehane Classification: grade I - full view of glottis  Placement verified by: capnometry   Measured from: lips  ETT to lips (cm): 23  Number of attempts at approach: 1    Additional Comments  Atraumatic intubation. Dentition and OP as per preop.

## 2024-11-11 NOTE — ANESTHESIA POSTPROCEDURE EVALUATION
[facility]    Cecil Aguayo Patient Status:  Hospital Outpatient Surgery   Age/Gender 84 year old male MRN [id number]   Location [facility] SURGERY Attending You Rosario MD   Hosp Day # 0 PCP Neal Cleveland MD       Anesthesia Post-op Note    ROBOTIC BILATERAL INGUINAL HERNIA REPAIR    Procedure Summary       Date: 11/11/24 Room / Location:  MAIN OR 09 / [facility] MAIN OR    Anesthesia Start: 1525 Anesthesia Stop: 1712    Procedure: ROBOTIC BILATERAL INGUINAL HERNIA REPAIR (Bilateral: Abdomen) Diagnosis:       Bilateral inguinal hernia without obstruction or gangrene, recurrence not specified      (Bilateral inguinal hernia without obstruction or gangrene, recurrence not specified [K40.20])    Surgeons: You Rosario MD Anesthesiologist: Gladis Reed MD    Anesthesia Type: general ASA Status: 2            Anesthesia Type: general    Vitals Value Taken Time   /65 11/11/24 1712   Temp 97.9 °F (36.6 °C) 11/11/24 1712   Pulse 81 11/11/24 1712   Resp 18 11/11/24 1712   SpO2 99 % 11/11/24 1712       Patient Location: PACU    Anesthesia Type: general    Airway Patency: patent and extubated    Postop Pain Control: adequate    Mental Status: mildly sedated but able to meaningfully participate in the post-anesthesia evaluation    Nausea/Vomiting: none    Cardiopulmonary/Hydration status: stable euvolemic    Complications: no apparent anesthesia related complications    Postop vital signs: stable    Dental Exam: Unchanged from Preop

## 2024-11-26 ENCOUNTER — OFFICE VISIT (OUTPATIENT)
Facility: LOCATION | Age: 84
End: 2024-11-26
Payer: MEDICARE

## 2024-11-26 VITALS
OXYGEN SATURATION: 100 % | HEART RATE: 65 BPM | SYSTOLIC BLOOD PRESSURE: 128 MMHG | DIASTOLIC BLOOD PRESSURE: 64 MMHG | TEMPERATURE: 97 F | RESPIRATION RATE: 18 BRPM

## 2024-11-26 DIAGNOSIS — K40.90 RIGHT INGUINAL HERNIA: ICD-10-CM

## 2024-11-26 DIAGNOSIS — K40.90 LEFT INGUINAL HERNIA: ICD-10-CM

## 2024-11-26 DIAGNOSIS — Z98.890 POSTOPERATIVE STATE: Primary | ICD-10-CM

## 2024-11-26 PROCEDURE — 3074F SYST BP LT 130 MM HG: CPT

## 2024-11-26 PROCEDURE — 3078F DIAST BP <80 MM HG: CPT

## 2024-11-26 PROCEDURE — 1170F FXNL STATUS ASSESSED: CPT

## 2024-11-26 PROCEDURE — 1160F RVW MEDS BY RX/DR IN RCRD: CPT

## 2024-11-26 PROCEDURE — 99024 POSTOP FOLLOW-UP VISIT: CPT

## 2024-11-26 PROCEDURE — 1159F MED LIST DOCD IN RCRD: CPT

## 2024-11-26 NOTE — PROGRESS NOTES
Follow Up Visit Note       Active Problems      1. Postoperative state    2. Left inguinal hernia    3. Right inguinal hernia          Chief Complaint   Chief Complaint   Patient presents with    Post-Op     PO 11/11 ROBOTIC BILATERAL INGUINAL HERNIA REPAIR w/[facility]ham          History of Present Illness  Cecil is an 84 year old male who underwent bilateral inguinal hernia repair with Dr. Rosario on 11/11/2024. He presents to clinic today for follow up appointment.    He reports he is doing well postoperatively. He reports no incisional pain, but does report discomfort in his groin bilaterally. He reports tolerating diet without nausea or vomiting. He reports constipation following surgery that resolved after taking stool softeners and prune juice. He denies urinary symptoms. He does report scrotal discomfort. He denies fever or chills.     Allergies  Cecil is allergic to bactrim [sulfamethoxazole w/trimethoprim].    Past Medical / Surgical / Social / Family History    The past medical and past surgical history have been reviewed by me today.    Past Medical History:    Arrhythmia    AFIB    Arthritis    knee artorits    Atherosclerosis of aorta (HCC)    Ct chest 7/2011    Blood disorder    THALASSEMIA MINOR    BPH (benign prostatic hyperplasia)    CANCER    Cancer (HCC)    Disorder of thyroid    Diverticulosis    DJD (degenerative joint disease)    Dr. Schafer    Exposure to medical diagnostic radiation    Hearing impaired person, bilateral    High blood pressure    Lumbar radiculopathy    Malignant neoplasm of thyroid gland (HCC)    Osteoarthrosis, unspecified whether generalized or localized, unspecified site    Osteopenia of thigh    Osteopenia of thigh, unspecified laterality    Prostate hypertrophy    Thalassemia minor    Thyroid cancer (HCC)    Dr. Marin at [facility]    Unspecified essential hypertension    Varicocele     Past Surgical History:   Procedure Laterality Date    Cataract  2009    bilateral eyes      Cholecystectomy      Colonoscopy      Hernia surgery  11/11/2024    ROBOTIC BILATERAL INGUINAL HERNIA REPAIR    Injection, anesthetic/steroid, transforaminal epidural; lumbar/sacral, add'l level  06/21/2013    Procedure: TRANSFORAMINAL EPIDURAL - LUMBAR;  Surgeon: Torres Cruz MD;  Location: [facility]    Injection, anesthetic/steroid, transforaminal epidural; lumbar/sacral, add'l level  08/15/2013    Procedure: TRANSFORAMINAL EPIDURAL - LUMBAR;  Surgeon: Torres Cruz MD;  Location: [facility]    Injection, anesthetic/steroid, transforaminal epidural; lumbar/sacral, single level  05/24/2013    Procedure: TRANSFORAMINAL EPIDURAL - LUMBAR;  Surgeon: Torres Cruz MD;  Location: [facility]    Injection, anesthetic/steroid, transforaminal epidural; lumbar/sacral, single level  06/21/2013    Procedure: TRANSFORAMINAL EPIDURAL - LUMBAR;  Surgeon: Torres Cruz MD;  Location: [facility]    Injection, anesthetic/steroid, transforaminal epidural; lumbar/sacral, single level  08/15/2013    Procedure: TRANSFORAMINAL EPIDURAL - LUMBAR;  Surgeon: Torres Cruz MD;  Location: [facility]    Knee replacement surgery Right 12/04/2024    Other surgical history      Thyroid Cancer     Other surgical history  2000    UNSPECIFIED SURGERY     Other surgical history  2005    Left salivary gland, removal of cancer      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    Family History   Problem Relation Age of Onset    Heart Disorder Father     Heart Attack Father     Hypertension Father     Hypertension Mother     Other (Other) Mother         unspecified CVA    Stroke Mother     Hypertension Other         unspecified sibling     Heart Disease Other         siblings     Hypertension Brother     Stroke Brother      Social History     Socioeconomic  History    Marital status:    Tobacco Use    Smoking status: Never    Smokeless tobacco: Never   Vaping Use    Vaping status: Never Used   Substance and Sexual Activity    Alcohol use: No    Drug use: No   Other Topics Concern    Caffeine Concern No    Exercise No    Seat Belt No    Special Diet No    Stress Concern No    Weight Concern No        Current Outpatient Medications:     oxyCODONE 5 MG Oral Tab, Take 1 tablet (5 mg total) by mouth every 6 (six) hours as needed for Pain., Disp: 15 tablet, Rfl: 0    senna-docusate 8.6-50 MG Oral Tab, Take 1 tablet by mouth daily., Disp: 30 tablet, Rfl: 0    ELIQUIS 5 MG Oral Tab, Take 1 tablet (5 mg total) by mouth 2 (two) times daily., Disp: , Rfl:     tamsulosin 0.4 MG Oral Cap, Take 1 capsule (0.4 mg total) by mouth After dinner., Disp: 90 capsule, Rfl: 0    NIFEdipine ER 30 MG Oral Tablet 24 Hr, Take 1 tablet (30 mg total) by mouth at bedtime., Disp: 90 tablet, Rfl: 3    Calcium 500 MG Oral Tab, Take 500 mg by mouth in the morning and 500 mg before bedtime., Disp: 30 tablet, Rfl: 0    enalapril 10 MG Oral Tab, Take 1 tablet (10 mg total) by mouth 2 (two) times daily., Disp: , Rfl: 0    atenolol 50 MG Oral Tab, Take 1 tablet (50 mg total) by mouth every morning., Disp: , Rfl: 0    hydroCHLOROthiazide 25 MG Oral Tab, Take 1 tablet (25 mg total) by mouth every other day., Disp: 30 tablet, Rfl: 3    OMEPRAZOLE 20 MG Oral Capsule Delayed Release, TAKE 1 CAPSULE EVERY DAY (Patient taking differently: as needed.), Disp: 90 capsule, Rfl: 0    SYNTHROID 112 MCG Oral Tab, Take 1 tablet (112 mcg total) by mouth daily., Disp: 90 tablet, Rfl: 3    Multiple Vitamins-Minerals (MULTI-VITAMIN/MINERALS) Oral Tab, Take 1 tablet by mouth daily., Disp: , Rfl:     Cholecalciferol (VITAMIN D) 2000 UNITS Oral Cap, Take 1 capsule (2,000 Units total) by mouth daily., Disp: , Rfl:      Review of Systems  The Review of Systems has been reviewed by me during today.  Review of Systems    Constitutional:  Negative for appetite change, chills, fatigue and fever.   Gastrointestinal:  Positive for constipation. Negative for abdominal distention, diarrhea, nausea and vomiting.   Genitourinary:  Negative for difficulty urinating, dysuria and hematuria.   Skin:  Negative for rash and wound.        Physical Findings   /64   Pulse 65   Temp 97 °F (36.1 °C) (Temporal)   Resp 18   SpO2 100%   Physical Exam  Vitals reviewed.   Constitutional:       General: He is not in acute distress.     Appearance: Normal appearance. He is not ill-appearing.   HENT:      Head: Normocephalic and atraumatic.   Eyes:      General: No scleral icterus.     Conjunctiva/sclera: Conjunctivae normal.   Pulmonary:      Effort: Pulmonary effort is normal. No respiratory distress.   Abdominal:      General: Abdomen is flat. There is no distension.      Palpations: Abdomen is soft.      Tenderness: There is no abdominal tenderness. There is no guarding or rebound.      Comments: Abdomen soft, non-distended, non-tender to palpation. Laparoscopic incision x 3 are clean, dry and healing appropriately. No surrounding erythema or drainage. No fluctuance to palpation. No signs of infection. Dermabond in place.      Skin:     General: Skin is warm and dry.      Coloration: Skin is not jaundiced.   Neurological:      Mental Status: He is alert.   Psychiatric:         Mood and Affect: Mood normal.         Behavior: Behavior normal.          Assessment   1. Postoperative state    2. Left inguinal hernia    3. Right inguinal hernia        Plan   The patient is doing well postoperatively.  Continue Diet as tolerated.  Tylenol and Ibuprofen as needed for pain control.   Continue local wound care, soap and water to the incisions.   I discussed with the patient that swelling and discomfort to his inguinal region and testes is common follow surgery and should continue to improve with time. Instructed patient to contact the office he can  apply warm compresses and wear compression shorts for comfort.   Recommend Lifting restrictions of no greater than 15-20 lbs for 4 weeks postoperatively  All the patient's questions and concerns were addressed. They voiced understanding and are in agreement with the plan     Follow Up  The patient requests to follow up with Dr. Rosario. He is scheduled to see him on 12/19/2024.      Melissa Mendez PA-C

## 2024-12-19 ENCOUNTER — OFFICE VISIT (OUTPATIENT)
Facility: LOCATION | Age: 84
End: 2024-12-19
Payer: MEDICARE

## 2024-12-19 VITALS
HEART RATE: 57 BPM | DIASTOLIC BLOOD PRESSURE: 70 MMHG | OXYGEN SATURATION: 99 % | TEMPERATURE: 96 F | SYSTOLIC BLOOD PRESSURE: 120 MMHG

## 2024-12-19 DIAGNOSIS — K40.20 NON-RECURRENT BILATERAL INGUINAL HERNIA WITHOUT OBSTRUCTION OR GANGRENE: Chronic | ICD-10-CM

## 2024-12-19 DIAGNOSIS — Z98.890 POSTOPERATIVE STATE: Primary | ICD-10-CM

## 2024-12-19 PROCEDURE — 3074F SYST BP LT 130 MM HG: CPT | Performed by: SURGERY

## 2024-12-19 PROCEDURE — 1170F FXNL STATUS ASSESSED: CPT | Performed by: SURGERY

## 2024-12-19 PROCEDURE — 1159F MED LIST DOCD IN RCRD: CPT | Performed by: SURGERY

## 2024-12-19 PROCEDURE — 3078F DIAST BP <80 MM HG: CPT | Performed by: SURGERY

## 2024-12-19 PROCEDURE — 99024 POSTOP FOLLOW-UP VISIT: CPT | Performed by: SURGERY

## 2024-12-19 PROCEDURE — 1160F RVW MEDS BY RX/DR IN RCRD: CPT | Performed by: SURGERY

## 2024-12-19 NOTE — H&P
New Patient Visit Note       Active Problems      1. Postoperative state    2. Non-recurrent bilateral inguinal hernia without obstruction or gangrene        Chief Complaint   Chief Complaint   Patient presents with    Post-Op     PO - 11/11 w/ pbp - ROBOTIC BILATERAL INGUINAL HERNIA REPAIR         History of Present Illness     Cecil is an 84-year-old male who returns to clinic for 6-week post-op evaluation of bilateral inguinal hernia repair. He states the mild testicular and scrotal pain is fully resolved since last visit. He denies abdominal pain or scrotal swelling, as well as any new recurrence of hernia bulges. He is very satisfied with the recovery course. His incisions are healing well without any pain or drainage. He is interested in progressing his activity level to include biking at his health club to prepare for upcoming knee surgery. He feels back to his normal baseline of health and has no other concerns to address today. Time was spent counseling Cecil on return to exercise precautions, and education regarding his surgery process and retained mesh was reviewed to his satisfaction.     I had a lengthy discussion with the patient regarding dietary, activity, exercise and lifestyle recommendations going forward.  Patient questions which were answered to his understanding.  No further follow-up required now unless the patient develops new issues.    The patient states he is scheduled to have knee replacement in a few weeks.  I expressed to the patient that he may proceed with the surgery and do any and all rehabilitation postoperatively without limitation at the direction of his orthopedic surgeon.      Allergies  Cecil is allergic to bactrim [sulfamethoxazole w/trimethoprim].    Past Medical / Surgical / Social / Family History    The past medical and past surgical history have been reviewed by me today.    Past Medical History:    Arrhythmia    AFIB    Arthritis    knee artorits    Atherosclerosis  of aorta (HCC)    Ct chest 7/2011    Blood disorder    THALASSEMIA MINOR    BPH (benign prostatic hyperplasia)    CANCER    Cancer (HCC)    Disorder of thyroid    Diverticulosis    DJD (degenerative joint disease)    Dr. Schafer    Exposure to medical diagnostic radiation    Hearing impaired person, bilateral    High blood pressure    Lumbar radiculopathy    Malignant neoplasm of thyroid gland (HCC)    Osteoarthrosis, unspecified whether generalized or localized, unspecified site    Osteopenia of thigh    Osteopenia of thigh, unspecified laterality    Prostate hypertrophy    Thalassemia minor    Thyroid cancer (HCC)    Dr. Marin at [facility]    Unspecified essential hypertension    Varicocele     Past Surgical History:   Procedure Laterality Date    Cataract  2009    bilateral eyes     Cholecystectomy      Colonoscopy      Hernia surgery  11/11/2024    ROBOTIC BILATERAL INGUINAL HERNIA REPAIR    Injection, anesthetic/steroid, transforaminal epidural; lumbar/sacral, add'l level  06/21/2013    Procedure: TRANSFORAMINAL EPIDURAL - LUMBAR;  Surgeon: Torres Cruz MD;  Location: [facility]    Injection, anesthetic/steroid, transforaminal epidural; lumbar/sacral, add'l level  08/15/2013    Procedure: TRANSFORAMINAL EPIDURAL - LUMBAR;  Surgeon: Torres Cruz MD;  Location: [facility]    Injection, anesthetic/steroid, transforaminal epidural; lumbar/sacral, single level  05/24/2013    Procedure: TRANSFORAMINAL EPIDURAL - LUMBAR;  Surgeon: Torres Cruz MD;  Location: [facility]    Injection, anesthetic/steroid, transforaminal epidural; lumbar/sacral, single level  06/21/2013    Procedure: TRANSFORAMINAL EPIDURAL - LUMBAR;  Surgeon: Torres Cruz MD;  Location: [facility]    Injection, anesthetic/steroid, transforaminal epidural; lumbar/sacral, single level  08/15/2013    Procedure: TRANSFORAMINAL EPIDURAL - LUMBAR;  Surgeon:  Torres Cruz MD;  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    Family History   Problem Relation Age of Onset    Heart Disorder Father     Heart Attack Father     Hypertension Father     Hypertension Mother     Other (Other) Mother         unspecified CVA    Stroke Mother     Hypertension Other         unspecified sibling     Heart Disease Other         siblings     Hypertension Brother     Stroke Brother      Social History     Socioeconomic History    Marital status:    Tobacco Use    Smoking status: Never    Smokeless tobacco: Never   Vaping Use    Vaping status: Never Used   Substance and Sexual Activity    Alcohol use: No    Drug use: No   Other Topics Concern    Caffeine Concern No    Exercise No    Seat Belt No    Special Diet No    Stress Concern No    Weight Concern No        Current Outpatient Medications:     oxyCODONE 5 MG Oral Tab, Take 1 tablet (5 mg total) by mouth every 6 (six) hours as needed for Pain., Disp: 15 tablet, Rfl: 0    ELIQUIS 5 MG Oral Tab, Take 1 tablet (5 mg total) by mouth 2 (two) times daily., Disp: , Rfl:     tamsulosin 0.4 MG Oral Cap, Take 1 capsule (0.4 mg total) by mouth After dinner., Disp: 90 capsule, Rfl: 0    NIFEdipine ER 30 MG Oral Tablet 24 Hr, Take 1 tablet (30 mg total) by mouth at bedtime., Disp: 90 tablet, Rfl: 3    Calcium 500 MG Oral Tab, Take 500 mg by mouth in the morning and 500 mg before bedtime., Disp: 30 tablet, Rfl: 0    enalapril 10 MG Oral Tab, Take 1 tablet (10 mg total) by mouth 2 (two) times daily., Disp: , Rfl: 0    atenolol 50 MG Oral Tab, Take 1 tablet (50 mg total) by mouth every morning., Disp: , Rfl:  0    hydroCHLOROthiazide 25 MG Oral Tab, Take 1 tablet (25 mg total) by mouth every other day., Disp: 30 tablet, Rfl: 3    OMEPRAZOLE 20 MG Oral Capsule Delayed Release, TAKE 1 CAPSULE EVERY DAY (Patient taking differently: as needed.), Disp: 90 capsule, Rfl: 0    SYNTHROID 112 MCG Oral Tab, Take 1 tablet (112 mcg total) by mouth daily., Disp: 90 tablet, Rfl: 3    Multiple Vitamins-Minerals (MULTI-VITAMIN/MINERALS) Oral Tab, Take 1 tablet by mouth daily., Disp: , Rfl:     Cholecalciferol (VITAMIN D) 2000 UNITS Oral Cap, Take 1 capsule (2,000 Units total) by mouth daily., Disp: , Rfl:       Review of Systems  The Review of Systems has been reviewed by me during today.  Review of Systems   Constitutional: Negative.  Negative for appetite change, fatigue and fever.   HENT: Negative.  Negative for rhinorrhea, sore throat and trouble swallowing.    Respiratory: Negative.  Negative for cough, shortness of breath and wheezing.    Cardiovascular: Negative.  Negative for chest pain and palpitations.   Gastrointestinal: Negative.  Negative for abdominal distention, abdominal pain, blood in stool, constipation, diarrhea, nausea and vomiting.   Genitourinary: Negative.  Negative for dysuria, hematuria, penile pain, scrotal swelling and testicular pain.   Musculoskeletal: Negative.  Negative for arthralgias and myalgias.   Skin: Negative.  Negative for rash and wound.   Allergic/Immunologic: Negative.  Negative for food allergies and immunocompromised state.   Neurological: Negative.  Negative for dizziness, weakness and numbness.       Physical Findings   /70   Pulse 57   Temp (!) 96.3 °F (35.7 °C)   SpO2 99%   Physical Exam  Constitutional:       Appearance: Normal appearance. He is not ill-appearing.   HENT:      Head: Normocephalic and atraumatic.   Eyes:      General: No scleral icterus.     Extraocular Movements: Extraocular movements intact.      Pupils: Pupils are equal, round, and reactive to light.    Cardiovascular:      Rate and Rhythm: Normal rate and regular rhythm.      Pulses: Normal pulses.      Heart sounds: Normal heart sounds.   Pulmonary:      Effort: Pulmonary effort is normal. No respiratory distress.      Breath sounds: Normal breath sounds.   Abdominal:      General: Abdomen is flat. There is no distension.      Palpations: Abdomen is soft. There is no mass.      Tenderness: There is no abdominal tenderness. There is no guarding or rebound.      Hernia: No hernia is present. There is no hernia in the umbilical area, ventral area, left inguinal area or right inguinal area.          Comments: Laparoscopic incision sites well-healing without erythema, drainage, or tenderness    Genitourinary:     Penis: Normal.       Testes: Normal.      Epididymis:      Right: Normal.      Left: Normal.       Musculoskeletal:         General: No tenderness. Normal range of motion.      Right lower leg: No edema.      Left lower leg: No edema.   Skin:     General: Skin is warm and dry.      Findings: No lesion or rash.   Neurological:      General: No focal deficit present.      Mental Status: He is alert and oriented to person, place, and time.             Assessment   1. Postoperative state    2. Non-recurrent bilateral inguinal hernia without obstruction or gangrene          Plan     The patient is recovering nicely following robotic bilateral inguinal hernia repair with mesh.    The anticipated postoperative recovery was discussed with the patient in detail.    Dietary, activity, and exercise recommendations along with restrictions were discussed with the patient during today's visit.    Wound care instructions were discussed during today's visit.    The patient will return to my attention on an as needed basis.    The patient is encouraged to continue seeing the primary care physician for ongoing medical needs.    The patient was given ample opportunity to ask questions.  The patient's questions were answered  in detail and to the patient's satisfaction.  The patient voiced understanding of the postoperative care plan.    The patient is scheduled to have knee replacement in the next few weeks.  The patient may proceed with his surgery and participate in any post operative rehabilitation without limitations from a general surgery standpoint.  Weightbearing status and activity limitations per his orthopedic surgeon.     No orders of the defined types were placed in this encounter.      Imaging & Referrals   None    Follow Up  No follow-ups on file.    You Rosario MD

## 2024-12-19 NOTE — PROGRESS NOTES
Post Operative Visit Note       Active Problems  No diagnosis found.     Chief Complaint   Chief Complaint   Patient presents with    Post-Op     PO - 11/11 w/ pbp - ROBOTIC BILATERAL INGUINAL HERNIA REPAIR            History of Present Illness         Allergies  Cecil is allergic to bactrim [sulfamethoxazole w/trimethoprim].    Past Medical / Surgical / Social / Family History    The past medical and past surgical history have been reviewed by me today.     Past Medical History:    Arrhythmia    AFIB    Arthritis    knee artorits    Atherosclerosis of aorta (HCC)    Ct chest 7/2011    Blood disorder    THALASSEMIA MINOR    BPH (benign prostatic hyperplasia)    CANCER    Cancer (HCC)    Disorder of thyroid    Diverticulosis    DJD (degenerative joint disease)    Dr. Schafer    Exposure to medical diagnostic radiation    Hearing impaired person, bilateral    High blood pressure    Lumbar radiculopathy    Malignant neoplasm of thyroid gland (HCC)    Osteoarthrosis, unspecified whether generalized or localized, unspecified site    Osteopenia of thigh    Osteopenia of thigh, unspecified laterality    Prostate hypertrophy    Thalassemia minor    Thyroid cancer (HCC)    Dr. Marin at [facility]    Unspecified essential hypertension    Varicocele     Past Surgical History:   Procedure Laterality Date    Cataract  2009    bilateral eyes     Cholecystectomy      Colonoscopy      Hernia surgery  11/11/2024    ROBOTIC BILATERAL INGUINAL HERNIA REPAIR    Injection, anesthetic/steroid, transforaminal epidural; lumbar/sacral, add'l level  06/21/2013    Procedure: TRANSFORAMINAL EPIDURAL - LUMBAR;  Surgeon: Torres Cruz MD;  Location: [facility]    Injection, anesthetic/steroid, transforaminal epidural; lumbar/sacral, add'l level  08/15/2013    Procedure: TRANSFORAMINAL EPIDURAL - LUMBAR;  Surgeon: Torres Cruz MD;  Location: [facility]    Injection, anesthetic/steroid, transforaminal  epidural; lumbar/sacral, single level  05/24/2013    Procedure: TRANSFORAMINAL EPIDURAL - LUMBAR;  Surgeon: Torres Cruz MD;  Location: [facility]    Injection, anesthetic/steroid, transforaminal epidural; lumbar/sacral, single level  06/21/2013    Procedure: TRANSFORAMINAL EPIDURAL - LUMBAR;  Surgeon: Torres Cruz MD;  Location: [facility]    Injection, anesthetic/steroid, transforaminal epidural; lumbar/sacral, single level  08/15/2013    Procedure: TRANSFORAMINAL EPIDURAL - LUMBAR;  Surgeon: Torres Cruz MD;  Location: [facility]    Knee replacement surgery Right 12/04/2024    Other surgical history      Thyroid Cancer     Other surgical history  2000    UNSPECIFIED SURGERY     Other surgical history  2005    Left salivary gland, removal of cancer      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    Family History   Problem Relation Age of Onset    Heart Disorder Father     Heart Attack Father     Hypertension Father     Hypertension Mother     Other (Other) Mother         unspecified CVA    Stroke Mother     Hypertension Other         unspecified sibling     Heart Disease Other         siblings     Hypertension Brother     Stroke Brother      Social History     Socioeconomic History    Marital status:    Tobacco Use    Smoking status: Never    Smokeless tobacco: Never   Vaping Use    Vaping status: Never Used   Substance and Sexual Activity    Alcohol use: No    Drug use: No   Other Topics Concern    Caffeine Concern No    Exercise No    Seat Belt No    Special Diet No    Stress Concern No    Weight Concern No        Current Outpatient Medications:     oxyCODONE 5 MG Oral Tab, Take 1 tablet (5 mg total) by mouth every 6 (six) hours as needed for Pain., Disp: 15 tablet, Rfl: 0    ELIQUIS 5 MG Oral Tab, Take 1 tablet (5 mg total) by mouth 2 (two) times daily.,  Disp: , Rfl:     tamsulosin 0.4 MG Oral Cap, Take 1 capsule (0.4 mg total) by mouth After dinner., Disp: 90 capsule, Rfl: 0    NIFEdipine ER 30 MG Oral Tablet 24 Hr, Take 1 tablet (30 mg total) by mouth at bedtime., Disp: 90 tablet, Rfl: 3    Calcium 500 MG Oral Tab, Take 500 mg by mouth in the morning and 500 mg before bedtime., Disp: 30 tablet, Rfl: 0    enalapril 10 MG Oral Tab, Take 1 tablet (10 mg total) by mouth 2 (two) times daily., Disp: , Rfl: 0    atenolol 50 MG Oral Tab, Take 1 tablet (50 mg total) by mouth every morning., Disp: , Rfl: 0    hydroCHLOROthiazide 25 MG Oral Tab, Take 1 tablet (25 mg total) by mouth every other day., Disp: 30 tablet, Rfl: 3    OMEPRAZOLE 20 MG Oral Capsule Delayed Release, TAKE 1 CAPSULE EVERY DAY (Patient taking differently: as needed.), Disp: 90 capsule, Rfl: 0    SYNTHROID 112 MCG Oral Tab, Take 1 tablet (112 mcg total) by mouth daily., Disp: 90 tablet, Rfl: 3    Multiple Vitamins-Minerals (MULTI-VITAMIN/MINERALS) Oral Tab, Take 1 tablet by mouth daily., Disp: , Rfl:     Cholecalciferol (VITAMIN D) 2000 UNITS Oral Cap, Take 1 capsule (2,000 Units total) by mouth daily., Disp: , Rfl:       Review of Systems  The Review of Systems has been reviewed by me during today.  Review of Systems    Physical Findings   There were no vitals taken for this visit.  Physical Exam        Assessment   No diagnosis found.      Plan            No orders of the defined types were placed in this encounter.      Imaging & Referrals   None    Follow Up  No follow-ups on file.    You Rosario MD

## 2024-12-30 RX ORDER — TAMSULOSIN HYDROCHLORIDE 0.4 MG/1
0.4 CAPSULE ORAL
Qty: 90 CAPSULE | Refills: 0 | Status: SHIPPED | OUTPATIENT
Start: 2024-12-30

## 2024-12-30 NOTE — TELEPHONE ENCOUNTER
tamsulosin 0.4 MG Oral Cap         Sig: Take 1 capsule (0.4 mg total) by mouth After dinner.    Disp: 90 capsule    Refills: 0    Start: 12/29/2024    Class: Normal    Non-formulary    Last ordered: 5 months ago (7/8/2024) by Neal Cleveland MD    Genitourinary Medications [id number]/2024 06:54 PM   Protocol Details Patient does not have pulmonary hypertension on problem list    In person appointment or virtual visit in the past 12 mos or appointment in next 3 mos      To be filled at: [id number] [address], [phone], [phone]

## 2025-01-04 ENCOUNTER — OFFICE VISIT (OUTPATIENT)
Dept: SLEEP CENTER | Age: 85
End: 2025-01-04
Attending: INTERNAL MEDICINE
Payer: MEDICARE

## 2025-01-04 DIAGNOSIS — G47.9 SLEEP DISORDER: ICD-10-CM

## 2025-01-04 DIAGNOSIS — I48.20 CHRONIC ATRIAL FIBRILLATION (HCC): Chronic | ICD-10-CM

## 2025-01-04 PROCEDURE — 95810 POLYSOM 6/> YRS 4/> PARAM: CPT

## 2025-01-09 ENCOUNTER — PATIENT MESSAGE (OUTPATIENT)
Facility: CLINIC | Age: 85
End: 2025-01-09

## 2025-01-13 ENCOUNTER — TELEPHONE (OUTPATIENT)
Dept: INTERNAL MEDICINE CLINIC | Facility: CLINIC | Age: 85
End: 2025-01-13

## 2025-01-13 NOTE — TELEPHONE ENCOUNTER
I need more information prior to putting any orders we will take care of it when he comes to the office

## 2025-01-13 NOTE — TELEPHONE ENCOUNTER
Pt spouse informed - pt giving push back and still wants lab work ordered now. Informed pt and spouse MD wants to wait and they are refusing to wait until appt

## 2025-01-13 NOTE — TELEPHONE ENCOUNTER
VM: Pt coming in for pre-op clearance, are there any labs you'd like pt to complete prior to appt?     Future Appointments   Date Time Provider Department Center   1/20/2025  3:30 PM Neal Cleveland MD [facility] [place]

## 2025-01-13 NOTE — TELEPHONE ENCOUNTER
Patient called requesting labs to be order prior to upcoming appt.      Future Appointments   Date Time Provider Department Center   1/20/2025  3:30 PM Neal Cleveland MD [facility] [place]

## 2025-01-13 NOTE — TELEPHONE ENCOUNTER
I am not sure what surgery is having who is doing the surgery under what anesthesia we  to scan the records- if they are in a hurry they should have thie performing physician who is doing the surgery put in the orders .Thats the best I can do

## 2025-01-14 NOTE — TELEPHONE ENCOUNTER
Attempted to call patient to relay Dr. Cleveland's recommendation. Left message for patient to call back.

## 2025-01-21 ENCOUNTER — SLEEP STUDY (OUTPATIENT)
Facility: CLINIC | Age: 85
End: 2025-01-21
Payer: MEDICARE

## 2025-01-21 DIAGNOSIS — G47.30 SLEEP APNEA, UNSPECIFIED TYPE: Primary | ICD-10-CM

## 2025-01-21 PROCEDURE — 95810 POLYSOM 6/> YRS 4/> PARAM: CPT | Performed by: INTERNAL MEDICINE

## 2025-01-23 ENCOUNTER — LAB ENCOUNTER (OUTPATIENT)
Dept: LAB | Age: 85
End: 2025-01-23
Attending: INTERNAL MEDICINE
Payer: MEDICARE

## 2025-01-23 ENCOUNTER — OFFICE VISIT (OUTPATIENT)
Dept: INTERNAL MEDICINE CLINIC | Facility: CLINIC | Age: 85
End: 2025-01-23
Payer: MEDICARE

## 2025-01-23 VITALS
TEMPERATURE: 98 F | RESPIRATION RATE: 16 BRPM | WEIGHT: 147 LBS | OXYGEN SATURATION: 100 % | DIASTOLIC BLOOD PRESSURE: 62 MMHG | HEIGHT: 67 IN | BODY MASS INDEX: 23.07 KG/M2 | SYSTOLIC BLOOD PRESSURE: 130 MMHG | HEART RATE: 60 BPM

## 2025-01-23 DIAGNOSIS — I48.0 PAROXYSMAL ATRIAL FIBRILLATION (HCC): Chronic | ICD-10-CM

## 2025-01-23 DIAGNOSIS — Z01.818 PREOP EXAM FOR INTERNAL MEDICINE: Primary | ICD-10-CM

## 2025-01-23 DIAGNOSIS — I10 ESSENTIAL HYPERTENSION, BENIGN: Chronic | ICD-10-CM

## 2025-01-23 DIAGNOSIS — G47.33 OSA (OBSTRUCTIVE SLEEP APNEA): ICD-10-CM

## 2025-01-23 PROBLEM — Z98.890 POSTOPERATIVE STATE: Status: RESOLVED | Noted: 2024-12-19 | Resolved: 2025-01-23

## 2025-01-23 LAB
ALBUMIN SERPL-MCNC: 4.6 G/DL (ref 3.2–4.8)
ALBUMIN/GLOB SERPL: 1.6 {RATIO} (ref 1–2)
ALP LIVER SERPL-CCNC: 103 U/L
ALT SERPL-CCNC: 15 U/L
ANION GAP SERPL CALC-SCNC: 7 MMOL/L (ref 0–18)
AST SERPL-CCNC: 31 U/L (ref ?–34)
BASOPHILS # BLD AUTO: 0.03 X10(3) UL (ref 0–0.2)
BASOPHILS NFR BLD AUTO: 0.6 %
BILIRUB SERPL-MCNC: 1.5 MG/DL (ref 0.2–1.1)
BUN BLD-MCNC: 13 MG/DL (ref 9–23)
CALCIUM BLD-MCNC: 9.9 MG/DL (ref 8.7–10.6)
CHLORIDE SERPL-SCNC: 96 MMOL/L (ref 98–112)
CO2 SERPL-SCNC: 30 MMOL/L (ref 21–32)
CREAT BLD-MCNC: 0.77 MG/DL
EGFRCR SERPLBLD CKD-EPI 2021: 89 ML/MIN/1.73M2 (ref 60–?)
EOSINOPHIL # BLD AUTO: 0.06 X10(3) UL (ref 0–0.7)
EOSINOPHIL NFR BLD AUTO: 1.3 %
ERYTHROCYTE [DISTWIDTH] IN BLOOD BY AUTOMATED COUNT: 18.3 %
FASTING STATUS PATIENT QL REPORTED: YES
GLOBULIN PLAS-MCNC: 2.9 G/DL (ref 2–3.5)
GLUCOSE BLD-MCNC: 88 MG/DL (ref 70–99)
HCT VFR BLD AUTO: 36 %
HGB BLD-MCNC: 11.8 G/DL
IMM GRANULOCYTES # BLD AUTO: 0.02 X10(3) UL (ref 0–1)
IMM GRANULOCYTES NFR BLD: 0.4 %
LYMPHOCYTES # BLD AUTO: 0.7 X10(3) UL (ref 1–4)
LYMPHOCYTES NFR BLD AUTO: 15.1 %
MCH RBC QN AUTO: 20.9 PG (ref 26–34)
MCHC RBC AUTO-ENTMCNC: 32.8 G/DL (ref 31–37)
MCV RBC AUTO: 63.8 FL
MONOCYTES # BLD AUTO: 0.55 X10(3) UL (ref 0.1–1)
MONOCYTES NFR BLD AUTO: 11.9 %
NEUTROPHILS # BLD AUTO: 3.27 X10 (3) UL (ref 1.5–7.7)
NEUTROPHILS # BLD AUTO: 3.27 X10(3) UL (ref 1.5–7.7)
NEUTROPHILS NFR BLD AUTO: 70.7 %
OSMOLALITY SERPL CALC.SUM OF ELEC: 276 MOSM/KG (ref 275–295)
PLATELET # BLD AUTO: 200 10(3)UL (ref 150–450)
POTASSIUM SERPL-SCNC: 4.2 MMOL/L (ref 3.5–5.1)
PROT SERPL-MCNC: 7.5 G/DL (ref 5.7–8.2)
RBC # BLD AUTO: 5.64 X10(6)UL
SODIUM SERPL-SCNC: 133 MMOL/L (ref 136–145)
WBC # BLD AUTO: 4.6 X10(3) UL (ref 4–11)

## 2025-01-23 PROCEDURE — 36415 COLL VENOUS BLD VENIPUNCTURE: CPT | Performed by: INTERNAL MEDICINE

## 2025-01-23 PROCEDURE — 3008F BODY MASS INDEX DOCD: CPT | Performed by: INTERNAL MEDICINE

## 2025-01-23 PROCEDURE — 3078F DIAST BP <80 MM HG: CPT | Performed by: INTERNAL MEDICINE

## 2025-01-23 PROCEDURE — 1159F MED LIST DOCD IN RCRD: CPT | Performed by: INTERNAL MEDICINE

## 2025-01-23 PROCEDURE — 85025 COMPLETE CBC W/AUTO DIFF WBC: CPT | Performed by: INTERNAL MEDICINE

## 2025-01-23 PROCEDURE — 3075F SYST BP GE 130 - 139MM HG: CPT | Performed by: INTERNAL MEDICINE

## 2025-01-23 PROCEDURE — 99214 OFFICE O/P EST MOD 30 MIN: CPT | Performed by: INTERNAL MEDICINE

## 2025-01-23 PROCEDURE — 80053 COMPREHEN METABOLIC PANEL: CPT | Performed by: INTERNAL MEDICINE

## 2025-01-23 NOTE — PATIENT INSTRUCTIONS
Patient already has had cardiac clearance sent over by Dr. Germain to the operating surgeon and he had an EKG echocardiogram and was asked to discontinue his Eliquis 5 days prior to surgery.  Patient does have an appointment with the pulmonologist next week for his KENDRA

## 2025-01-23 NOTE — PROGRESS NOTES
Cecil Aguayo  1940 is a 84 year old male.    Chief Complaint   Patient presents with    Pre-Op Exam     2025- Left Knee replacement        HPI:   He  has had previous anesthesia:  yes  Previous complications:  none  Current Outpatient Medications   Medication Sig Dispense Refill    tamsulosin 0.4 MG Oral Cap Take 1 capsule (0.4 mg total) by mouth After dinner. 90 capsule 0    ELIQUIS 5 MG Oral Tab Take 1 tablet (5 mg total) by mouth 2 (two) times daily.      NIFEdipine ER 30 MG Oral Tablet 24 Hr Take 1 tablet (30 mg total) by mouth at bedtime. 90 tablet 3    Calcium 500 MG Oral Tab Take 500 mg by mouth in the morning and 500 mg before bedtime. 30 tablet 0    enalapril 10 MG Oral Tab Take 1 tablet (10 mg total) by mouth 2 (two) times daily.  0    atenolol 50 MG Oral Tab Take 1 tablet (50 mg total) by mouth every morning.  0    hydroCHLOROthiazide 25 MG Oral Tab Take 1 tablet (25 mg total) by mouth every other day. 30 tablet 3    OMEPRAZOLE 20 MG Oral Capsule Delayed Release TAKE 1 CAPSULE EVERY DAY 90 capsule 0    SYNTHROID 112 MCG Oral Tab Take 1 tablet (112 mcg total) by mouth daily. 90 tablet 3    Multiple Vitamins-Minerals (MULTI-VITAMIN/MINERALS) Oral Tab Take 1 tablet by mouth daily.      Cholecalciferol (VITAMIN D) 2000 UNITS Oral Cap Take 1 capsule (2,000 Units total) by mouth daily.        Past Medical History:    Arrhythmia    AFIB    Arthritis    knee artorits    Atherosclerosis of aorta (HCC)    Ct chest 2011    Blood disorder    THALASSEMIA MINOR    BPH (benign prostatic hyperplasia)    CANCER    Cancer (HCC)    Disorder of thyroid    Diverticulosis    DJD (degenerative joint disease)    Dr. Schafer    Exposure to medical diagnostic radiation    Hearing impaired person, bilateral    High blood pressure    Lumbar radiculopathy    Malignant neoplasm of thyroid gland (HCC)    Osteoarthrosis, unspecified whether generalized or localized, unspecified site    Osteopenia of thigh     Osteopenia of thigh, unspecified laterality    Prostate hypertrophy    Thalassemia minor    Thyroid cancer (HCC)    Dr. Marin at [facility]    Unspecified essential hypertension    Varicocele      Social History:  Social History     Socioeconomic History    Marital status:    Tobacco Use    Smoking status: Never    Smokeless tobacco: Never   Vaping Use    Vaping status: Never Used   Substance and Sexual Activity    Alcohol use: No    Drug use: No   Other Topics Concern    Caffeine Concern No    Exercise No    Seat Belt No    Special Diet No    Stress Concern No    Weight Concern No        REVIEW OF SYSTEMS:     General/Constitutional:   General able to do usual activities, good exercise tolerance, good general state of health, no fatigue, no fever, no weakness, no weight loss or gain .   HEENT/Neck:   Head no headache, no dizziness, no lightheadedness. Eyes no complaints. Ears no complaints. Nose and Sinuses no complaints. Mouth and Pharynx no sore throats, no hoarseness. Neck no lumps, no goiter, no neck stiffness or pain.   Endocrine:   Diabetes none. Thyroid disorder hypothyroid   Respiratory:   Patient denies chest pain, cough, BARROW (dyspnea on exertion), chest congestion, blood-tinged sputum/wheezing. Breathing normal pattern .   Cardiovascular:   Patient denies chest pain, shortness of breath/rheumatic fever/murmur/dizzziness cholesterol normal. Leg edema none. Orthopnea none. Palpitations none. PND (paroxsymal nocturnal dyspnea) none.  See his cardiologist Dr. Germain who has sent clearance Recent stress thallium was negative.  Gastrointestinal:   Patient denies abdominal pain, blood in stool, constipation, diarrhea, difficulty swallowing, change in stools, heartburn, nausea, vomiting no weight changes noted   Hematology:   Patient denies abnormal bleeding, easy bleeding, easy bruising. Enlarged lymph nodes none.   Neurologic:   Patient denies dizziness, fainting, loss of consciousness, weakness.   Psychiatric:    Anxiety no.   Sleep disorder none patient confesses to none however on leading questionnaire after he failed the bang questionnaire the patient does state that he does snore and apparently wife says that he does not breathe well--patient does have an appointment with his pulmonologist for CPAP discussion        EXAM:   /62 (BP Location: Right arm, Patient Position: Sitting, Cuff Size: adult)   Pulse 60   Temp 98.2 °F (36.8 °C) (Temporal)   Resp 16   Ht 5' 7\" (1.702 m)   Wt 147 lb (66.7 kg)   SpO2 100%   BMI 23.02 kg/m²   GENERAL:   Build: normal .   General Appearance: alert and oriented, pleasant.   HEENT:   Ear canals: normal.   EOM: within normal limit-conjunctiva normal.   Head: normocephalic.   Nasal septum: midline.   Nose: unremarkable.   Oral cavity: normal.   Pupils: normal, bilaterally .   Sclera: normal.   Turbinates: normal.   NECK:   Carotid bruit: none.   Cervical lymph nodes: unremarkable.   JVD: none.   Range of Motion: normal.   Thyroid: unremarkable.   HEART:   Clicks: absent .   Edema: none visible .   Heart sounds: normal.   Murmurs: none.   Rhythm: Irregular  LUNGS:   Auscultation: clear .   Chest Shape: normal .   Percussion: normal.   Rales: no .   Respiratory effort: normal .   Rhonchi: no.   Wheezes: no.   ABDOMEN:   General: normal.   Liver, Spleen: non-enlarged.   EXTREMITIES:   Clubbing: none.   Cyanosis: absent .   Edema: none.   Pulses: present, bilateral.   NEUROLOGICAL:   Babinski: negative/all reflexes are normal.   Cerebellar Testing grossly/intact: yes.   Gait: normal.   Motor: power-normal/tone -normal/co-ordination normal/wasting -none/involuntary movements -none.   Sensory: normal sensation to all modalities.             ASSESSMENT AND PLAN:   Cecil was seen today for pre-op exam.    Diagnoses and all orders for this visit:    Preop exam for internal medicine  -     CBC With Differential With Platelet  -     Comp Metabolic Panel (14)    Essential hypertension,  benign    Paroxysmal atrial fibrillation (HCC)    KENDRA (obstructive sleep apnea)           Per protocol lab work reviewed.  Patient cleared for surgery.  See below under patient instruction    Patient Instructions   Patient already has had cardiac clearance sent over by Dr. Germain to the operating surgeon and he had an EKG echocardiogram and was asked to discontinue his Eliquis 5 days prior to surgery.  Patient does have an appointment with the pulmonologist next week for his KENDRA   The patient indicates understanding of these issues and agrees to the plan.  The patient is asked to No follow-ups on file.  .      Neal Cleveland MD

## 2025-01-31 ENCOUNTER — OFFICE VISIT (OUTPATIENT)
Facility: CLINIC | Age: 85
End: 2025-01-31
Payer: MEDICARE

## 2025-01-31 VITALS
TEMPERATURE: 97 F | OXYGEN SATURATION: 100 % | SYSTOLIC BLOOD PRESSURE: 110 MMHG | WEIGHT: 146 LBS | RESPIRATION RATE: 16 BRPM | HEIGHT: 67 IN | HEART RATE: 69 BPM | BODY MASS INDEX: 22.91 KG/M2 | DIASTOLIC BLOOD PRESSURE: 60 MMHG

## 2025-01-31 DIAGNOSIS — I10 ESSENTIAL HYPERTENSION, BENIGN: Chronic | ICD-10-CM

## 2025-01-31 DIAGNOSIS — G47.33 OSA (OBSTRUCTIVE SLEEP APNEA): Primary | ICD-10-CM

## 2025-01-31 DIAGNOSIS — I48.21 PERMANENT ATRIAL FIBRILLATION (HCC): Chronic | ICD-10-CM

## 2025-01-31 PROCEDURE — 3008F BODY MASS INDEX DOCD: CPT | Performed by: INTERNAL MEDICINE

## 2025-01-31 PROCEDURE — 1159F MED LIST DOCD IN RCRD: CPT | Performed by: INTERNAL MEDICINE

## 2025-01-31 PROCEDURE — 3074F SYST BP LT 130 MM HG: CPT | Performed by: INTERNAL MEDICINE

## 2025-01-31 PROCEDURE — 3078F DIAST BP <80 MM HG: CPT | Performed by: INTERNAL MEDICINE

## 2025-01-31 PROCEDURE — 99204 OFFICE O/P NEW MOD 45 MIN: CPT | Performed by: INTERNAL MEDICINE

## 2025-01-31 PROCEDURE — 1160F RVW MEDS BY RX/DR IN RCRD: CPT | Performed by: INTERNAL MEDICINE

## 2025-01-31 NOTE — PATIENT INSTRUCTIONS
Plan:    Schedule CPAP titration sleep study to be interpreted by Dr. Everette Hernandez, will then arrange a NEW CPAP machine   Advised about weight loss   Advised against drowsy driving and to avoid alcoholic beverage and respiratory depressants as these may worsen sleep apnea      Follow up: 3  months     Everette Hernandez MD      Obstructive Sleep Apnea  Obstructive sleep apnea is a condition caused by air passages becoming narrowed or blocked during sleep. As a result, breathing stops for short periods. Your body wakes up enough for breathing to start again. But you don't remember it. The cycle of stopped breathing and brief awakenings can repeat dozens of times a night. This prevents the body from getting to the deeper stages of sleep that are needed for good rest.   Signs of sleep apnea include loud snoring, noisy breathing, and gasping sounds during sleep. People with sleep apnea often find they use the bathroom many times during the night. Daytime symptoms include waking up tired after a full night's sleep and waking up with headaches. They can also include feeling very sleepy or falling asleep during the day, and having problems with memory or concentration.   Risk factors for sleep apnea include:  Being overweight  Being assigned male at birth, or being in menopause  Smoking  Using alcohol or sedating medicines  Having enlarged structures in the nose or throat such as enlarged tonsils or adenoids, or extra tissue in the airway  Home care  Lifestyle changes that can help treat snoring and sleep apnea include:   If you're overweight, talk with your healthcare provider about a weight-loss plan for you.  Don't drink alcohol for 3 to 4 hours before bedtime.  Don't take sedating medicines. Ask your healthcare provider about the medicines you take.  If you smoke, talk to your provider about ways to quit. It's important to stay away from secondhand smoke. Don't use e-cigarettes because of their harmful side effects.  Sleep  on your side. This can help prevent gravity from pulling relaxed throat tissues into your breathing passages.  If you have allergies or sinus problems that block your nose, ask your provider for help.  Use positive airway pressure (PAP). Discuss with your provider the benefits of using PAP at home. And talk about the type of PAP that's best for you.  Follow-up care  Follow up with your healthcare provider, or as advised. A diagnosis of sleep apnea is made with a sleep study. Your provider can tell you more about this test.   When to get medical care  See your healthcare provider if you have daytime symptoms of sleep apnea. These include:   Waking up tired after a full night's sleep  Waking up with a headache  Feeling very sleepy or falling asleep during the day  Having problems with memory or concentration  Also talk with your provider if your partner tells you that you snore, gasp for air, or stop breathing while you sleep.   Seeing your provider is important because sleep apnea can make you more likely to have certain health problems. These include high blood pressure, heart attack, stroke, and sexual dysfunction. If you have sleep apnea, talk with your healthcare provider about the best treatments for you.   Nathaniel last reviewed this educational content on 5/1/2022 © 2000-2023 [employer]. All rights reserved. This information is not intended as a substitute for professional medical care. Always follow your healthcare professional's instructions.        Continuous Positive Air Pressure (CPAP)     A mask over the nose gently directs air into the throat to keep the airway open.     Continuous positive air pressure (CPAP) uses gentle air pressure to hold the airway open. CPAP is often the most effective treatment for sleep apnea. It works very well as a treatment for adults diagnosed with obstructive sleep apnea with a lot of sleepiness. But keep in mind that it can take several adjustments before  the setup is right for you.   How CPAP works  The CPAP machine  is a small portable pump that sits beside the bed. The pump sends air through a hose, which is held over your nose alone, or nose and mouth by a mask. Mild air pressure is gently pushed through your airway. The air pressure nudges sagging tissues aside. This widens the airway so you can breathe better. CPAP may be combined with other kinds of therapy for sleep apnea.   Types of air pressure treatments  There are different types of CPAP. Your doctor or CPAP technician will help you decide which type is best for you:   Basic CPAP keeps the pressure constant all night long.  A bilevel device (BiPAP) provides more pressure when you breathe in and less when you breathe out. A BiPAP machine also may be set to provide automatic breaths to maintain breathing if you stop breathing while sleeping.  An autoCPAP device automatically adjusts pressure throughout the night and in response to changes such as body position, sleep stage, and snoring.  [employer] last reviewed this educational content on 7/1/2019  © 2657-1753 [employer]. All rights reserved. This information is not intended as a substitute for professional medical care. Always follow your healthcare professional's instructions.

## 2025-01-31 NOTE — PROGRESS NOTES
Pulmonary/Critical Care/Sleep Medicine    Consult Note     PCP: Neal Cleveland MD   Phone: [phone]   Fax: [phone]        Chief Complaint   Patient presents with    New Patient     Pt here for sleep consult.  Pt states snoring and s/s performed 1/8/2025.  Pt states irregular hearbeat.       HPI  I had the pleasure of seeing Cecil Aguayo who is a pleasant 84 year old male who presents for evaluation of KENDRA    The patient states that he had snoring and history of atrial fibrillation so a home sleep study was performed that showed KENDRA , so he presents for sleep evaluation.      He states bed time around 1130 PM . It takes 10 min to 1 hour to fall asleep and leaves bed around 630-7 AM. He wakes up sometimes 1  times a night for urination.  He is NOT sleepy and fatigued during the daytime.  He denies nightmares, sleep talking or sleep walking.  He admits to occasional  AM headaches.  He denies symptoms sleep attacks     The patient denies kicking legs at night. Denies  teeth grinding.      He drinks 2-3 caffeine  tea  daily. and rare caffeine cans of soda      He has no pets     Hx of tobacco use: He  reports that he has never smoked. He has never used smokeless tobacco.    Past Medical History:    Arrhythmia    AFIB    Arthritis    knee artorits    Atherosclerosis of aorta    Ct chest 7/2011    Blood disorder    THALASSEMIA MINOR    BPH (benign prostatic hyperplasia)    CANCER    Cancer (HCC)    Disorder of thyroid    Diverticulosis    DJD (degenerative joint disease)    Dr. Schafer    Exposure to medical diagnostic radiation    Hearing impaired person, bilateral    High blood pressure    Lumbar radiculopathy    Malignant neoplasm of thyroid gland (HCC)    Osteoarthrosis, unspecified whether generalized or localized, unspecified site    Osteopenia of thigh    Osteopenia of thigh, unspecified laterality    Prostate hypertrophy    Thalassemia minor    Thyroid cancer (HCC)    Dr. Marin at [facility]     Unspecified essential hypertension    Varicocele      Past Surgical History:   Procedure Laterality Date    Cataract  2009    bilateral eyes     Cholecystectomy      Colonoscopy      Excision of salivary gland Left 2005    cancer of salivary gland    Hernia surgery  11/11/2024    ROBOTIC BILATERAL INGUINAL HERNIA REPAIR    Inguinal hernia repair Bilateral 11/2024    Injection, anesthetic/steroid, transforaminal epidural; lumbar/sacral, add'l level  06/21/2013    Procedure: TRANSFORAMINAL EPIDURAL - LUMBAR;  Surgeon: Torres Cruz MD;  Location: [facility]    Injection, anesthetic/steroid, transforaminal epidural; lumbar/sacral, add'l level  08/15/2013    Procedure: TRANSFORAMINAL EPIDURAL - LUMBAR;  Surgeon: Torres Cruz MD;  Location: [facility]    Injection, anesthetic/steroid, transforaminal epidural; lumbar/sacral, single level  05/24/2013    Procedure: TRANSFORAMINAL EPIDURAL - LUMBAR;  Surgeon: Torres Cruz MD;  Location: [facility]    Injection, anesthetic/steroid, transforaminal epidural; lumbar/sacral, single level  06/21/2013    Procedure: TRANSFORAMINAL EPIDURAL - LUMBAR;  Surgeon: Torres Cruz MD;  Location: [facility]    Injection, anesthetic/steroid, transforaminal epidural; lumbar/sacral, single level  08/15/2013    Procedure: TRANSFORAMINAL EPIDURAL - LUMBAR;  Surgeon: Torres Cruz MD;  Location: [facility]    Knee replacement surgery Right 12/04/2024    Other surgical history      Thyroid Cancer     Other surgical history  2000    UNSPECIFIED SURGERY     Other surgical history  2005    Left salivary gland, removal of cancer      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 90 capsule 0    ELIQUIS 5 MG Oral Tab Take 1  tablet (5 mg total) by mouth 2 (two) times daily.      NIFEdipine ER 30 MG Oral Tablet 24 Hr Take 1 tablet (30 mg total) by mouth at bedtime. 90 tablet 3    Calcium 500 MG Oral Tab Take 500 mg by mouth in the morning and 500 mg before bedtime. 30 tablet 0    enalapril 10 MG Oral Tab Take 1 tablet (10 mg total) by mouth 2 (two) times daily.  0    atenolol 50 MG Oral Tab Take 1 tablet (50 mg total) by mouth every morning.  0    hydroCHLOROthiazide 25 MG Oral Tab Take 1 tablet (25 mg total) by mouth every other day. 30 tablet 3    OMEPRAZOLE 20 MG Oral Capsule Delayed Release TAKE 1 CAPSULE EVERY DAY 90 capsule 0    SYNTHROID 112 MCG Oral Tab Take 1 tablet (112 mcg total) by mouth daily. 90 tablet 3    Multiple Vitamins-Minerals (MULTI-VITAMIN/MINERALS) Oral Tab Take 1 tablet by mouth daily.      Cholecalciferol (VITAMIN D) 2000 UNITS Oral Cap Take 1 capsule (2,000 Units total) by mouth daily.        Social History     Socioeconomic History    Marital status:    Occupational History    Occupation: Electrical [employer]   Tobacco Use    Smoking status: Never    Smokeless tobacco: Never   Vaping Use    Vaping status: Never Used   Substance and Sexual Activity    Alcohol use: No    Drug use: No   Other Topics Concern    Caffeine Concern No    Exercise No    Seat Belt No    Special Diet No    Stress Concern No    Weight Concern No      Immunization History   Administered Date(s) Administered    Covid-19 Vaccine Pfizer 30 mcg/0.3 ml 02/12/2021, 03/05/2021, 10/05/2021    Covid-19 Vaccine Pfizer Bivalent 30mcg/0.3mL 11/10/2022, 06/30/2023    Covid-19 Vaccine Pfizer Allan-Sucrose 30 mcg/0.3 ml 06/02/2022    FLU VAC High Dose 65 YRS & Older PRSV Free (73282) 01/19/2016, 01/10/2020, 11/02/2022    FLUAD High Dose 65 yr and older (65660) 12/07/2018, 11/09/2020, 12/29/2021    Fluvirin, 3 Years & >, Im 12/23/2014    Fluzone Vaccine Medicare () 01/18/2016, 12/08/2016, 11/02/2017, 01/10/2020    High Dose Fluzone Influenza  Vaccine, 65yr+ PF 0.5mL (91056) 02/14/2024    Influenza 02/12/2014, 12/09/2016, 10/15/2022    Pfizer Covid-19 Vaccine 30mcg/0.3ml 12yrs+ 02/14/2024    Pneumococcal (Prevnar 13) 12/09/2016    Pneumococcal Conjugate PCV20 07/14/2023    Pneumococcal Vaccine, Conjugate 10/01/2022    Pneumovax 23 01/25/2018    Zoster Vaccine Live (Zostavax) 11/06/2017    Zoster Vaccine Recombinant Adjuvanted (Shingrix) 07/14/2023      Family History   Problem Relation Age of Onset    Heart Disorder Father     Heart Attack Father     Hypertension Father     Hypertension Mother     Other (Other) Mother         unspecified CVA    Stroke Mother     Hypertension Other         unspecified sibling     Heart Disease Other         siblings     Hypertension Brother     Stroke Brother         Review of Systems   Constitutional:  Negative for fatigue, fever and unexpected weight change.   HENT:  Negative for congestion, mouth sores, nosebleeds, postnasal drip, rhinorrhea, sore throat and trouble swallowing.    Eyes:  Negative for visual disturbance.   Respiratory:  Negative for apnea, cough, choking, chest tightness, shortness of breath and wheezing.    Cardiovascular:  Negative for chest pain, palpitations and leg swelling.   Gastrointestinal:  Negative for abdominal pain, constipation, diarrhea, nausea and vomiting.   Genitourinary:  Negative for difficulty urinating.   Musculoskeletal:  Positive for arthralgias. Negative for back pain, gait problem and myalgias.   Neurological:  Positive for headaches. Negative for dizziness and weakness.   Psychiatric/Behavioral:  Positive for sleep disturbance.         Vitals:    01/31/25 0932   BP: 110/60   Pulse: 69   Resp: 16   Temp: 97.2 °F (36.2 °C)      SpO2: 100 %  Ht Readings from Last 1 Encounters:   01/31/25 5' 7\" (1.702 m)     Wt Readings from Last 1 Encounters:   01/31/25 146 lb (66.2 kg)     Body mass index is 22.87 kg/m².     Physical Exam  Constitutional:       General: He is not in acute  distress.     Appearance: Normal appearance. He is not ill-appearing or diaphoretic.   HENT:      Head: Normocephalic and atraumatic.      Nose: Nose normal. No congestion or rhinorrhea.      Comments: Narrow nares      Mouth/Throat:      Mouth: Mucous membranes are moist.      Pharynx: Oropharynx is clear. No oropharyngeal exudate or posterior oropharyngeal erythema.      Comments: Mallampati class IV palate , edentulous   Eyes:      Extraocular Movements: Extraocular movements intact.      Pupils: Pupils are equal, round, and reactive to light.   Cardiovascular:      Rate and Rhythm: Normal rate.      Pulses: Normal pulses.      Heart sounds: Normal heart sounds. No murmur heard.  Pulmonary:      Effort: Pulmonary effort is normal. No respiratory distress.      Breath sounds: Normal breath sounds. No wheezing or rhonchi.   Chest:      Chest wall: No tenderness.   Abdominal:      General: Abdomen is flat. Bowel sounds are normal.      Palpations: Abdomen is soft.   Musculoskeletal:         General: Normal range of motion.   Skin:     General: Skin is warm.   Neurological:      General: No focal deficit present.      Mental Status: He is alert and oriented to person, place, and time.   Psychiatric:         Mood and Affect: Mood normal.         Behavior: Behavior normal.         Thought Content: Thought content normal.         Judgment: Judgment normal.             Labs:  Last BMP  Lab Results   Component Value Date    GLU 88 01/23/2025    BUN 13 01/23/2025    CREATSERUM 0.77 01/23/2025    BUNCREA NOT APPLICABLE 09/21/2020    ANIONGAP 7 01/23/2025    GFRAA 99 07/01/2022    GFRNAA 86 07/01/2022    CA 9.9 01/23/2025     (L) 01/23/2025    K 4.2 01/23/2025    CL 96 (L) 01/23/2025    CO2 30.0 01/23/2025    OSMOCALC 276 01/23/2025      Last CBC  Lab Results   Component Value Date    WBC 4.6 01/23/2025    RBC 5.64 01/23/2025    HGB 11.8 (L) 01/23/2025    HCT 36.0 (L) 01/23/2025    MCV 63.8 (L) 01/23/2025    MCH 20.9  (L) 01/23/2025    MCHC 32.8 01/23/2025    RDW 18.3 01/23/2025    .0 01/23/2025      Last CMP  Lab Results   Component Value Date    GLU 88 01/23/2025    BUN 13 01/23/2025    BUNCREA NOT APPLICABLE 09/21/2020    CREATSERUM 0.77 01/23/2025    ANIONGAP 7 01/23/2025    GFR 92 02/01/2018    GFRNAA 86 07/01/2022    GFRAA 99 07/01/2022    CA 9.9 01/23/2025    OSMOCALC 276 01/23/2025    ALKPHO 103 01/23/2025    AST 31 01/23/2025    ALT 15 01/23/2025    BILT 1.5 (H) 01/23/2025    TP 7.5 01/23/2025    ALB 4.6 01/23/2025    GLOBULIN 2.9 01/23/2025    AGRATIO 1.8 08/12/2020     (L) 01/23/2025    K 4.2 01/23/2025    CL 96 (L) 01/23/2025    CO2 30.0 01/23/2025      Last Thyroid Function  Lab Results   Component Value Date    T4F 2.00 (H) 09/16/2021    TSH 0.514 02/07/2023        Imaging:  No results found.       Asthma Control Test:   (In The Last 4 Weeks)     Asthma Control Test Score: 25 points out of 25 points      14 or less  Asthma is very poorly controlled    15-19  Asthma is not as controlled as it could be   20-25  Asthma is under control       Study 1/4/2024 Type: Diagnostic   Procedure: The Polysomnogram was performed with a sleep technologist in attendance at the [facility]. The following parameters were monitored: central, occipital and temporal EEG, EOG, submentalis EMG, nasal flow, nasal pressure, respiratory inductance plethysmography and oxygen saturation via continuous pulse oximetry. Sleep stages, periodic limb movements and EEG arousals were scored in accordance with the American Academy of Sleep Medicine Manual for the Scoring of Sleep and Associated Events. Apnea Hypopnea Index was calculated using the recommended definition of hypopnea for scoring respiratory events. Data acquisition, collection and scoring have been validated and clinically correlated.   Sleep History: MADISON PINTO is a 84 year old Male referred by MARGOTH SNYDER for the evaluation of suspected sleep disordered  breathing.   Past Medical History is pertinent for snoring, afib, arythmia, diverticulosis, high blood pressure, prostate hypertrophy.   At the time of the study, the patient was prescribed the following medications: tamsulosin, eliquis, atenolol, HCTZ, omeprazole, synthroid.   The patient was studied from 10:01:52 PM to 05:04:00 AM, with a total recording time of 422.1, total sleep time of 342.0 and a sleep efficiency of 81.0%. Wake after sleep onset was 57.5 minutes. The percentage of total sleep time by sleep stage is as follows: Stage 1 3.5%, Stage 2 88.0%, Stage 3 0.0% and Stage REM 8.5%.   Respiratory Analysis: The Apnea-Hypopnea Index (AHI) was 12.5 events per hour. REM related AHI was 8.3 events per hour. Supine related AHI was 24.6. Lateral related AHI was 8.6. The Central Apnea Index was 0. The oxygen saturation emre was 81.0% and patient spent 0.3% with saturations less than 88%.   Snoring Profile: Snoring was moderate.   Cardiac Profile: Electrocardiogram demonstrated atrial fibrillation.   EEG Profile: Based on the limited recording montage, there were no significant EEG abnormalities noted. There were 89 arousals, with a total arousal index of 15.6.   Periodic Limb Movements: PLM index of 0. PLM arousal index of 0   IMPRESSIONS: This study demonstrates mild obstructive sleep apnea. AHI is 12.5 events per hour.   Diagnosis: Obstructive Sleep Apnea G47.33   RECOMMENDATIONS:   1. It is recommended to proceed with CPAP titration.   2. The patient should avoid alcohol and sedative medications, as these may worsen severity of symptoms.   3. The patient should avoid drowsy driving.   4. Patient to follow up with a sleep specialist in clinic.     Thank you for allowing me to participate in this patient's care. Please do not hesitate to contact me with any additional questions.   Dictated by: Dr. Hernandez   Electronically signed by Everette Hernandez MD   (Electronic Signature)   Board Certified in Sleep Medicine        [place] Sleepiness Scale: (ESS) score on today's visit is 3 out of 24.     Score total of 1-6    Normal sleep   Score total of 7-8    Average sleepiness   Score total of 9-24    Abnormal (possibly pathologic) sleepiness       Impression:    Obstructive sleep apnea syndrome (OSAS): Home sleep study performed on 1/4/2024  showed  Apnea-Hypopnea Index (AHI) was 12.5 events per hour. REM related AHI was 8.3 events per hour. Supine related AHI was 24.6. Lateral related AHI was 8.6. The Central Apnea Index was 0. The oxygen saturation emre was 81.0% and patient spent 0.3% with saturations less than 88%.   Daytime hypersomnolence/fatigue  Body mass index is 22.87 kg/m².  Hypertension  Atrial fibrillation   BPH   Hx thyroid cancer   Thalassemia minor   Varicocele                               Plan:    Schedule CPAP titration sleep study to be interpreted by Dr. Everette Hernandez, will then arrange a NEW CPAP machine   Advised about weight loss   Advised against drowsy driving and to avoid alcoholic beverage and respiratory depressants as these may worsen sleep apnea      Follow up: 3  months     Thank you for allowing me to participate in your patient care.    STACEY Hernandez MD, FACP, FCCP, FAASM - Pulmonary/Critical care/Sleep Medicine  Please contact our office if you have any questions or concerns at [phone]    Note to the patient: The 21st Century Cures Act makes medical notes like these available to patients in the interest of transparency. However, be advised that this is a medical document. It is intended as peer to peer communication. It is written in medical language and may contain abbreviations or verbiage that are unfamiliar. It may appear blunt or direct. Medical documents are intended to carry relevant information, facts as evident, and clinical opinion of the practitioner.      Disclaimer: Components of this note were documented using voice recognition system and are subject to errors not corrected at  proofreading. Contact the author of this note for any clarifications         [1]   Allergies  Allergen Reactions    Bactrim [Sulfamethoxazole W/Trimethoprim] RASH and FEVER

## 2025-04-07 RX ORDER — TAMSULOSIN HYDROCHLORIDE 0.4 MG/1
0.4 CAPSULE ORAL
Qty: 90 CAPSULE | Refills: 0 | Status: SHIPPED | OUTPATIENT
Start: 2025-04-07

## 2025-04-07 NOTE — TELEPHONE ENCOUNTER
Requesting    tamsulosin 0.4 MG Oral Cap         Sig: Take 1 capsule (0.4 mg total) by mouth After dinner.    Disp: 90 capsule    Refills: 0    Start: 4/6/2025    Class: Normal    Non-formulary    Last ordered: 3 months ago (12/30/2024) by Neal Cleveland MD    Patient comment: urgent    Genitourinary Medications [id number]/06/2025 12:26 PM   Protocol Details Patient does not have pulmonary hypertension on problem list    In person appointment or virtual visit in the past 12 mos or appointment in next 3 mos    Medication is active on med list           LOV: 1/23/2025  RTC: none noted   Last Relevant Labs: n/a   Filled: 12/30/2024 #90 with 0 refills    No future appointments.

## 2025-04-27 DIAGNOSIS — I10 ESSENTIAL HYPERTENSION, BENIGN: Chronic | ICD-10-CM

## 2025-04-28 RX ORDER — NIFEDIPINE 30 MG
30 TABLET, EXTENDED RELEASE ORAL NIGHTLY
Qty: 90 TABLET | Refills: 3 | Status: SHIPPED | OUTPATIENT
Start: 2025-04-28

## 2025-04-28 NOTE — TELEPHONE ENCOUNTER
Name from pharmacy: NIFEdipine ER Oral Tablet Extended Release 24 Hour 30 MG         Will file in chart as: NIFEDIPINE ER 30 MG Oral Tablet 24 Hr    Sig: TAKE 1 TABLET AT BEDTIME    Disp: 90 tablet    Refills: 3    Start: 4/27/2025    Class: Normal    Non-formulary For: Essential hypertension, benign    Last ordered: 11 months ago (5/7/2024) by Neal Cleveland MD    Last refill: 2/3/2025    Rx #: [id number]    Hypertension Medications Protocol [id number]/27/2025 10:10 AM   Protocol Details Medication is active on med list    CMP or BMP in past 12 months    Last BP reading less than 140/90    In person appointment or virtual visit in the past 12 mos or appointment in next 3 mos    EGFRCR or GFRNAA > 50      To be filled at: [facility] - [facility] [address] [phone], [phone]

## 2025-06-09 ENCOUNTER — TELEPHONE (OUTPATIENT)
Dept: INTERNAL MEDICINE CLINIC | Facility: CLINIC | Age: 85
End: 2025-06-09

## 2025-06-09 DIAGNOSIS — Z00.00 PREVENTATIVE HEALTH CARE: Primary | ICD-10-CM

## 2025-06-09 NOTE — TELEPHONE ENCOUNTER
PT called requesting for her and spouses labs to be faxed to cardiologist Dr. Germain at [phone]. Printed and faxed. Confirmation received. Nothing more needed.

## 2025-06-09 NOTE — TELEPHONE ENCOUNTER
Pt requesting an order for lipid panel prior to est care with RP.  Future Appointments   Date Time Provider Department Center   7/1/2025  9:30 AM Saray Blakely MD [facility] [place]     Please advice pt

## 2025-06-12 ENCOUNTER — LAB ENCOUNTER (OUTPATIENT)
Dept: LAB | Age: 85
End: 2025-06-12
Attending: INTERNAL MEDICINE
Payer: MEDICARE

## 2025-06-12 DIAGNOSIS — Z00.00 PREVENTATIVE HEALTH CARE: ICD-10-CM

## 2025-06-12 LAB
CHOLEST SERPL-MCNC: 123 MG/DL (ref ?–200)
FASTING PATIENT LIPID ANSWER: YES
HDLC SERPL-MCNC: 53 MG/DL (ref 40–59)
LDLC SERPL CALC-MCNC: 60 MG/DL (ref ?–100)
NONHDLC SERPL-MCNC: 70 MG/DL (ref ?–130)
TRIGL SERPL-MCNC: 42 MG/DL (ref 30–149)
VLDLC SERPL CALC-MCNC: 6 MG/DL (ref 0–30)

## 2025-06-12 PROCEDURE — 80061 LIPID PANEL: CPT

## 2025-06-12 PROCEDURE — 36415 COLL VENOUS BLD VENIPUNCTURE: CPT

## 2025-07-01 ENCOUNTER — OFFICE VISIT (OUTPATIENT)
Dept: INTERNAL MEDICINE CLINIC | Facility: CLINIC | Age: 85
End: 2025-07-01
Payer: MEDICARE

## 2025-07-01 ENCOUNTER — LAB ENCOUNTER (OUTPATIENT)
Dept: LAB | Age: 85
End: 2025-07-01
Attending: INTERNAL MEDICINE
Payer: MEDICARE

## 2025-07-01 VITALS
OXYGEN SATURATION: 100 % | TEMPERATURE: 98 F | WEIGHT: 145.81 LBS | HEART RATE: 52 BPM | SYSTOLIC BLOOD PRESSURE: 127 MMHG | DIASTOLIC BLOOD PRESSURE: 54 MMHG | BODY MASS INDEX: 23 KG/M2 | RESPIRATION RATE: 12 BRPM

## 2025-07-01 DIAGNOSIS — N39.43 BENIGN PROSTATIC HYPERPLASIA WITH POST-VOID DRIBBLING: Chronic | ICD-10-CM

## 2025-07-01 DIAGNOSIS — I48.21 PERMANENT ATRIAL FIBRILLATION (HCC): Chronic | ICD-10-CM

## 2025-07-01 DIAGNOSIS — R53.83 FATIGUE, UNSPECIFIED TYPE: ICD-10-CM

## 2025-07-01 DIAGNOSIS — I10 PRIMARY HYPERTENSION: Primary | Chronic | ICD-10-CM

## 2025-07-01 DIAGNOSIS — M85.89 OSTEOPENIA OF MULTIPLE SITES: Chronic | ICD-10-CM

## 2025-07-01 DIAGNOSIS — I10 PRIMARY HYPERTENSION: ICD-10-CM

## 2025-07-01 DIAGNOSIS — E89.0 POSTOPERATIVE HYPOTHYROIDISM: Chronic | ICD-10-CM

## 2025-07-01 DIAGNOSIS — D56.3 THALASSEMIA MINOR: Chronic | ICD-10-CM

## 2025-07-01 DIAGNOSIS — Z85.850 HISTORY OF THYROID CANCER: Chronic | ICD-10-CM

## 2025-07-01 DIAGNOSIS — M85.89 OSTEOPENIA OF MULTIPLE SITES: ICD-10-CM

## 2025-07-01 DIAGNOSIS — N40.1 BENIGN PROSTATIC HYPERPLASIA WITH POST-VOID DRIBBLING: Chronic | ICD-10-CM

## 2025-07-01 PROBLEM — K40.20 NON-RECURRENT BILATERAL INGUINAL HERNIA WITHOUT OBSTRUCTION OR GANGRENE: Chronic | Status: RESOLVED | Noted: 2024-05-21 | Resolved: 2025-07-01

## 2025-07-01 PROBLEM — H40.003 PREGLAUCOMA, UNSPECIFIED, BILATERAL: Status: ACTIVE | Noted: 2019-12-10

## 2025-07-01 LAB
ALBUMIN SERPL-MCNC: 4.8 G/DL (ref 3.2–4.8)
ALBUMIN/GLOB SERPL: 1.7 {RATIO} (ref 1–2)
ALP LIVER SERPL-CCNC: 112 U/L (ref 45–117)
ALT SERPL-CCNC: 17 U/L (ref 10–49)
ANION GAP SERPL CALC-SCNC: 7 MMOL/L (ref 0–18)
AST SERPL-CCNC: 29 U/L (ref ?–34)
BASOPHILS # BLD AUTO: 0.03 X10(3) UL (ref 0–0.2)
BASOPHILS NFR BLD AUTO: 0.6 %
BILIRUB SERPL-MCNC: 1.7 MG/DL (ref 0.2–1.1)
BUN BLD-MCNC: 10 MG/DL (ref 9–23)
CALCIUM BLD-MCNC: 9.9 MG/DL (ref 8.7–10.6)
CHLORIDE SERPL-SCNC: 96 MMOL/L (ref 98–112)
CO2 SERPL-SCNC: 28 MMOL/L (ref 21–32)
CREAT BLD-MCNC: 0.75 MG/DL (ref 0.7–1.3)
EGFRCR SERPLBLD CKD-EPI 2021: 89 ML/MIN/1.73M2 (ref 60–?)
EOSINOPHIL # BLD AUTO: 0.09 X10(3) UL (ref 0–0.7)
EOSINOPHIL NFR BLD AUTO: 1.7 %
ERYTHROCYTE [DISTWIDTH] IN BLOOD BY AUTOMATED COUNT: 16.6 %
EST. AVERAGE GLUCOSE BLD GHB EST-MCNC: 108 MG/DL (ref 68–126)
FASTING STATUS PATIENT QL REPORTED: YES
GLOBULIN PLAS-MCNC: 2.8 G/DL (ref 2–3.5)
GLUCOSE BLD-MCNC: 88 MG/DL (ref 70–99)
HBA1C MFR BLD: 5.4 % (ref ?–5.7)
HCT VFR BLD AUTO: 35.7 % (ref 39–53)
HGB BLD-MCNC: 11.8 G/DL (ref 13–17.5)
IMM GRANULOCYTES # BLD AUTO: 0.02 X10(3) UL (ref 0–1)
IMM GRANULOCYTES NFR BLD: 0.4 %
LYMPHOCYTES # BLD AUTO: 1.06 X10(3) UL (ref 1–4)
LYMPHOCYTES NFR BLD AUTO: 19.6 %
MCH RBC QN AUTO: 20.8 PG (ref 26–34)
MCHC RBC AUTO-ENTMCNC: 33.1 G/DL (ref 31–37)
MCV RBC AUTO: 63 FL (ref 80–100)
MONOCYTES # BLD AUTO: 0.63 X10(3) UL (ref 0.1–1)
MONOCYTES NFR BLD AUTO: 11.6 %
NEUTROPHILS # BLD AUTO: 3.58 X10 (3) UL (ref 1.5–7.7)
NEUTROPHILS # BLD AUTO: 3.58 X10(3) UL (ref 1.5–7.7)
NEUTROPHILS NFR BLD AUTO: 66.1 %
OSMOLALITY SERPL CALC.SUM OF ELEC: 270 MOSM/KG (ref 275–295)
PLATELET # BLD AUTO: 192 10(3)UL (ref 150–450)
POTASSIUM SERPL-SCNC: 4.8 MMOL/L (ref 3.5–5.1)
PROT SERPL-MCNC: 7.6 G/DL (ref 5.7–8.2)
RBC # BLD AUTO: 5.67 X10(6)UL (ref 3.8–5.8)
SODIUM SERPL-SCNC: 131 MMOL/L (ref 136–145)
VIT D+METAB SERPL-MCNC: 42.4 NG/ML (ref 30–100)
WBC # BLD AUTO: 5.4 X10(3) UL (ref 4–11)

## 2025-07-01 PROCEDURE — 3074F SYST BP LT 130 MM HG: CPT | Performed by: INTERNAL MEDICINE

## 2025-07-01 PROCEDURE — 3078F DIAST BP <80 MM HG: CPT | Performed by: INTERNAL MEDICINE

## 2025-07-01 PROCEDURE — 83036 HEMOGLOBIN GLYCOSYLATED A1C: CPT

## 2025-07-01 PROCEDURE — 82306 VITAMIN D 25 HYDROXY: CPT

## 2025-07-01 PROCEDURE — 80053 COMPREHEN METABOLIC PANEL: CPT

## 2025-07-01 PROCEDURE — 36415 COLL VENOUS BLD VENIPUNCTURE: CPT

## 2025-07-01 PROCEDURE — 85025 COMPLETE CBC W/AUTO DIFF WBC: CPT

## 2025-07-01 PROCEDURE — 1159F MED LIST DOCD IN RCRD: CPT | Performed by: INTERNAL MEDICINE

## 2025-07-01 PROCEDURE — 1160F RVW MEDS BY RX/DR IN RCRD: CPT | Performed by: INTERNAL MEDICINE

## 2025-07-01 PROCEDURE — 99214 OFFICE O/P EST MOD 30 MIN: CPT | Performed by: INTERNAL MEDICINE

## 2025-07-01 RX ORDER — ACETAMINOPHEN 500 MG
1000 TABLET ORAL DAILY
COMMUNITY

## 2025-07-01 RX ORDER — NIFEDIPINE 30 MG
30 TABLET, EXTENDED RELEASE ORAL NIGHTLY
Qty: 90 TABLET | Refills: 3 | Status: SHIPPED | OUTPATIENT
Start: 2025-07-01

## 2025-07-01 RX ORDER — ATORVASTATIN CALCIUM 10 MG/1
TABLET, FILM COATED ORAL
COMMUNITY
Start: 2025-06-17

## 2025-07-01 RX ORDER — TAMSULOSIN HYDROCHLORIDE 0.4 MG/1
0.4 CAPSULE ORAL
Qty: 90 CAPSULE | Refills: 3 | Status: SHIPPED | OUTPATIENT
Start: 2025-07-01

## 2025-07-01 NOTE — PROGRESS NOTES
Cecil Aguayo is a 85 year old male.   HPI:   Patient presents for follow up on chronic medical issues. Previous patient of Dr. Cleveland.    Hypertension - at goal blood pressure.  Atrial fibrillation - no palpitations or shortness of breath.  Postoperative hypothyroidism - has prior history of thyroid cancer.  Follows with [facility].    Osteopenia - no recent falls/fractures.  BPH - post void dribbling.  Tamsulosin helps symptoms.   Thalassemia minor - no orthostatic symptoms.      He notes he went on a trip to [facility] recently.  After the trip feels a little more fatigued.  No fevers/chills/sweats, no cough, no diarrhea, no unintentional weight loss.    Past medical, family, surgical and social history were reviewed as listed in the chart, and are unchanged from previous visit.    REVIEW OF SYSTEMS:   GENERAL/ const: no fevers/chills, no unintentional weight loss  SKIN: denies any unusual skin lesions  EYES:no vision problems  HEENT: denies sinus pain or sinus tenderness  LUNGS: denies shortness of breath   CARDIOVASCULAR: denies chest pain  GI: denies nausea/emesis/ abdominal pain diarrhea constipation  : denies dysuria   MUSCULOSKELETAL: no acute arthralgias  NEURO: denies headaches  PSYCHIATRIC: denies issues  ENDOCRINE: no hot/cold intolerance  ALLERGY: [Allergies]    [Allergies]  Allergen Reactions    Bactrim [Sulfamethoxazole W/Trimethoprim] RASH and FEVER     PAST HISTORY:     Medications - Current[1]  Medical:  has a past medical history of Arrhythmia, Arthritis (5/6 years ago), Atherosclerosis of aorta (07/10/2019), Atrial fibrillation, chronic (HCC) (2023), Blood disorder, BPH (benign prostatic hyperplasia), CANCER, Cancer (HCC), Disorder of thyroid, Diverticulosis, DJD (degenerative joint disease), Exposure to medical diagnostic radiation, Hearing impaired person, bilateral, High blood pressure (20 years ago), Lumbar radiculopathy (09/16/2011), Malignant neoplasm of thyroid gland  (HCC) (01/29/2014), Non-recurrent bilateral inguinal hernia without obstruction or gangrene (05/21/2024), Osteoarthrosis, unspecified whether generalized or localized, unspecified site, Osteopenia of thigh (04/07/2017), Osteopenia of thigh, unspecified laterality (04/07/2017), Prostate hypertrophy (02/08/2016), Thalassemia minor, Thyroid cancer (HCC) (2008), Unspecified essential hypertension, and Varicocele.  Surgical:  has a past surgical history that includes other surgical history; injection, anesthetic/steroid, transforaminal epidural; lumbar/sacral, single level (05/24/2013); injection, anesthetic/steroid, transforaminal epidural; lumbar/sacral, single level (06/21/2013); injection, anesthetic/steroid, transforaminal epidural; lumbar/sacral, add'l level (06/21/2013); injection, anesthetic/steroid, transforaminal epidural; lumbar/sacral, single level (08/15/2013); injection, anesthetic/steroid, transforaminal epidural; lumbar/sacral, add'l level (08/15/2013); other surgical history (2000); other surgical history (2005); thyroidectomy (2008); cataract (2009); cholecystectomy; colonoscopy; knee replacement surgery (Right, 12/04/2024); hernia surgery (11/11/2024); Inguinal hernia repair (Bilateral, 11/2024); and excision of salivary gland (Left, 2005).  Family: family history includes Heart Attack in Cecil's father; Heart Disease in an other family member; Heart Disorder in Cecil's father; Hypertension in Cecil's brother, father, mother, and another family member; Other in Cecil's mother; Stroke in Cecil's brother and mother.  Social:  reports that Cecil has never smoked. Cecil has never used smokeless tobacco. Cecil reports that Cecil does not drink alcohol and does not use drugs.  Wt Readings from Last 6 Encounters:   07/01/25 145 lb 12.8 oz (66.1 kg)   01/31/25 146 lb (66.2 kg)   01/23/25 147 lb (66.7 kg)   11/11/24 146 lb 13.2 oz (66.6 kg)   10/31/24 146 lb (66.2 kg)   10/22/24 144 lb (65.3 kg)        EXAM:   /54 (BP Location: Left arm, Patient Position: Sitting, Cuff Size: adult)   Pulse 52   Temp 97.7 °F (36.5 °C) (Temporal)   Resp 12   Wt 145 lb 12.8 oz (66.1 kg)   SpO2 100%   BMI 22.84 kg/m²   GENERAL: Alert and oriented, well developed, well nourished,in no apparent distress  SKIN: small patches left knee  HEENT: atraumatic, PERRLA, EOMI, normal lid and conjunctiva  NECK: supple, no jvd, no thyromegaly  LUNGS: clear to auscultation bilaterally, no wheezing/rubs  CARDIO: irregularly irregular without murmurs.  No clubbing, cyanosis or edema.  GI: soft non tender nondistended no hepatosplenomegaly, bowel sounds throughout  NEURO: CN II-XII intact, 5/5 strength all extremities  MS: Full ROM  PSYCH: pleasant, appropriate mood and affect  ASSESSMENT AND PLAN:   1. Primary hypertension  At goal blood pressure.  Labs ordered as below.  Continue nifedipine ER 30 mg every day, atenolol 50 mg every day, enalapril 10 mg every day, HCTZ 25 mg every day.    - Comp Metabolic Panel (14); Future  - CBC With Differential With Platelet; Future  - NIFEdipine ER 30 MG Oral Tablet 24 Hr; Take 1 tablet (30 mg total) by mouth nightly. TAKE AT BEDTIME  Dispense: 90 tablet; Refill: 3    2. Permanent atrial fibrillation (HCC)  No palpitations.  Follows with Cardiology.  Anticoagulated on Eliquis.  Rate controlled on nifedipine, also on atenolol.  His cardiologist Dr. Germain just started him on atorvastatin - patient states he had lower extremity dopplers which were suggestive of possible peripheral vascular disease.      3. Postoperative hypothyroidism  4. History of thyroid cancer  Follows with [facility].  They are monitoring thyroglobulin levels.  On levothyroxine therapy.    5. Osteopenia of multiple sites  No recent falls/fractures.  Will check vitamin D levels.  - Vitamin D; Future    6. Benign prostatic hyperplasia with post-void dribbling  Stable on tamsulosin therapy.  Refill sent to mail  order.  - tamsulosin 0.4 MG Oral Cap; Take 1 capsule (0.4 mg total) by mouth After dinner.  Dispense: 90 capsule; Refill: 3    7. Thalassemia minor  Chronic issue.  Updated CBC ordered.    8. Fatigue, unspecified type  Some increased fatigue of late.  No constitutional or acute infectious symptoms.  Will check labs as below.  May consider further work up (chest x-ray, echo) if symptoms persist.  - Comp Metabolic Panel (14); Future  - CBC With Differential With Platelet; Future  - Hemoglobin A1C; Future    Patient notes some red patchy areas in left knee - had surgery there about six months ago.  Recommended he try OTC hydrocortisone cream.  He has six month follow up visit with his surgeon's office later this month as well.    Patient Care Team:  Saray Blakely MD as PCP - General (Internal Medicine)  Khurram Li MD (GASTROENTEROLOGY)  Ghanshyam Germain MD (Cardiovascular Diseases)  Davide Fontanez MD (OPHTHALMOLOGY)  Franco Titus (ENDOCRINOLOGY)  The patient indicates understanding of these issues and agrees to the plan.  The patient is asked to return to clinic in 6-12 months for follow up on chronic issues/MA Supervisit, or earlier if acute issues arise.    Saray Blakely MD           [1]   Current Outpatient Medications:     atorvastatin 10 MG Oral Tab, , Disp: , Rfl:     acetaminophen (TYLENOL EXTRA STRENGTH) 500 MG Oral Tab, Take 2 tablets (1,000 mg total) by mouth in the morning., Disp: , Rfl:     NIFEdipine ER 30 MG Oral Tablet 24 Hr, Take 1 tablet (30 mg total) by mouth nightly. TAKE AT BEDTIME, Disp: 90 tablet, Rfl: 3    tamsulosin 0.4 MG Oral Cap, Take 1 capsule (0.4 mg total) by mouth After dinner., Disp: 90 capsule, Rfl: 3    ELIQUIS 5 MG Oral Tab, Take 1 tablet (5 mg total) by mouth 2 (two) times daily., Disp: , Rfl:     Calcium 500 MG Oral Tab, Take 500 mg by mouth in the morning and 500 mg before bedtime., Disp: 30 tablet, Rfl: 0    enalapril 10 MG Oral Tab, Take 1 tablet (10 mg total)  by mouth 2 (two) times daily., Disp: , Rfl: 0    atenolol 50 MG Oral Tab, Take 1 tablet (50 mg total) by mouth every morning., Disp: , Rfl: 0    hydroCHLOROthiazide 25 MG Oral Tab, Take 1 tablet (25 mg total) by mouth every other day., Disp: 30 tablet, Rfl: 3    OMEPRAZOLE 20 MG Oral Capsule Delayed Release, TAKE 1 CAPSULE EVERY DAY, Disp: 90 capsule, Rfl: 0    SYNTHROID 112 MCG Oral Tab, Take 1 tablet (112 mcg total) by mouth daily., Disp: 90 tablet, Rfl: 3    Multiple Vitamins-Minerals (MULTI-VITAMIN/MINERALS) Oral Tab, Take 1 tablet by mouth daily., Disp: , Rfl:     Cholecalciferol (VITAMIN D) 2000 UNITS Oral Cap, Take 1 capsule (2,000 Units total) by mouth daily., Disp: , Rfl:

## 2025-07-01 NOTE — PATIENT INSTRUCTIONS
- Recommend starting/taking the Lipitor daily  - Get rest of blood tests done today (do not have to be fasting)  - Get Shingles shot at [facility] some time this summer.  Get flu shot in the fall  - Nifedipine, Flomax refilled to mail order  - Follow up after the new year for your Medicare Wellness visit/chronic issues; follow up earlier as needed.    It was a pleasure seeing you in the clinic today.  Thank you for choosing the [facility] [place] office for your healthcare needs. Please call at [phone] with any questions or concerns.    Saray Blakely MD

## (undated) DIAGNOSIS — R82.90 ABNORMAL URINALYSIS: Primary | ICD-10-CM

## (undated) DEVICE — STERILE DRAPE FOR USE WITH SITUATE ROOM SCANNER: Brand: SITUATE

## (undated) DEVICE — SKN PREP SPNG STKS PVP PNT STR: Brand: MEDLINE INDUSTRIES, INC.

## (undated) DEVICE — BLADELESS OBTURATOR: Brand: WECK VISTA

## (undated) DEVICE — ADHESIVE SKIN TOP FOR WND CLSR DERMBND ADV

## (undated) DEVICE — CADIERE FORCEPS: Brand: ENDOWRIST

## (undated) DEVICE — COVER,LIGHT,CAMERA,HARD,1/PK,STRL: Brand: MEDLINE

## (undated) DEVICE — MONOPOLAR CURVED SCISSORS: Brand: ENDOWRIST

## (undated) DEVICE — SEAL

## (undated) DEVICE — COLUMN DRAPE

## (undated) DEVICE — 40580 - THE PINK PAD - ADVANCED TRENDELENBURG POSITIONING KIT: Brand: 40580 - THE PINK PAD - ADVANCED TRENDELENBURG POSITIONING KIT

## (undated) DEVICE — SUT MCRYL 4-0 18IN PS-2 ABSRB UD 19MM 3/8 CIR

## (undated) DEVICE — [HIGH FLOW INSUFFLATOR,  DO NOT USE IF PACKAGE IS DAMAGED,  KEEP DRY,  KEEP AWAY FROM SUNLIGHT,  PROTECT FROM HEAT AND RADIOACTIVE SOURCES.]: Brand: PNEUMOSURE

## (undated) DEVICE — TRAY CATH 16FR F INCL BARDX IC COMPLT CARE

## (undated) DEVICE — ARM DRAPE

## (undated) DEVICE — TIP COVER ACCESSORY

## (undated) DEVICE — ENDOPATH ULTRA VERESS INSUFFLATION NEEDLES WITH LUER LOCK CONNECTORS: Brand: ENDOPATH

## (undated) DEVICE — ROBOTIC GENERAL: Brand: MEDLINE INDUSTRIES, INC.

## (undated) DEVICE — LAPAROVUE VISIBILITY SYSTEM LAPAROSCOPIC SOLUTIONS: Brand: LAPAROVUE

## (undated) DEVICE — GLOVE SUR 8 SENSICARE PI PIP CRM PWD F

## (undated) DEVICE — ABSORBABLE WOUND CLOSURE DEVICE: Brand: V-LOC 90

## (undated) DEVICE — DRAPE,TOP,102X53,STERILE: Brand: MEDLINE

## (undated) DEVICE — MEGA SUTURECUT ND: Brand: ENDOWRIST

## (undated) NOTE — LETTER
24    Patient: Cecil Aguayo  : 1940 Visit date: 2024    Dear  Neal Cleveland MD    Thank you for referring Cecil Aguayo to my practice.  Please find my assessment and plan below.     Assessment   1. Postoperative state    2. Non-recurrent bilateral inguinal hernia without obstruction or gangrene          Plan     The patient is recovering nicely following robotic bilateral inguinal hernia repair with mesh.    The anticipated postoperative recovery was discussed with the patient in detail.    Dietary, activity, and exercise recommendations along with restrictions were discussed with the patient during today's visit.    Wound care instructions were discussed during today's visit.    The patient will return to my attention on an as needed basis.    The patient is encouraged to continue seeing the primary care physician for ongoing medical needs.    The patient was given ample opportunity to ask questions.  The patient's questions were answered in detail and to the patient's satisfaction.  The patient voiced understanding of the postoperative care plan.    The patient is scheduled to have knee replacement in the next few weeks.  The patient may proceed with his surgery and participate in any post operative rehabilitation without limitations from a general surgery standpoint.  Weightbearing status and activity limitations per his orthopedic surgeon.      Sincerely,       You Rosario MD   CC: No Recipients

## (undated) NOTE — LETTER
10/24/19        Baudilio Nascimento  [address]  [address] [address]      Dear Andrew Russo,    [facility] records indicate that you have outstanding lab work and or testing that was ordered for you and has not yet been completed:Non Fasting Lab Work   To co

## (undated) NOTE — LETTER
Dr. Berry    Surgical Clearance Needed    Date: 5/21/2024                                                                       From: Eleni Traylor: Dr. Cleveland                                                                                Fax:     RE: Surgical Clearance               # of Pages: 1        Patient Name: Cecil Aguayo    Patient YOB: 1940    Consult For:  Medical Clearance    Surgery: ROBOTIC LAPAROSCOPIC RIGHT INGUINAL HERNIA REPAIR WITH MESH, POSSIBLE LEFT    Date of Surgery: TBD AT [facility]        This facsimile transmission is provided for your internal use only. If the reader of this message is not the intended recipient, you are hereby notified that you have received this document in error, and that any review, dissemination, distribution, or copying of this message is strictly prohibited. If you have received this communication in error, please notify us immediately by telephone and return the original message to us by mail.

## (undated) NOTE — LETTER
01/28/21        [facility]  [address]  [address] [facility] [address]      Dear Cheyanne Andrade,    [facility] records indicate that you have outstanding lab work and or testing that was ordered for you and has not yet been completed:  Orders Placed This Encount

## (undated) NOTE — LETTER
OSR/KENDRA Notification    Patient Name: Cecil Aguayo  -Age / Sex: 1940-A: 84 y  male  Medical Records: [id number] [facility]: [id number]    Surgeon(s):  Surgeon(s):  You Rosario MD   Procedure: ROBOTIC BILATERAL INGUINAL HERNIA REPAIR    Anesthesia Type: General Surgery Date:24    During the pre-operative screening process using the STOP-Bang questionnaire, the patient listed above was identified as being at high risk for obstructive sleep apnea.    If you feel it is appropriate to schedule a sleep study, the [facility] will give priority to patients scheduled for procedures to minimize surgical delays.     If a sleep study is completed prior to surgery, please fax the results/plan of care to Pre-Admission Testing ([phone]) as this information will be utilized in clinical decision-making regarding the patient's upcoming surgery.    Thank you for your assistance in helping us provide a safe, seamless and personal experience for your patient.    Elton Alexander MD  , Department of Anesthesia

## (undated) NOTE — LETTER
09/04/19        Joel Catching  [address] [place] [address]      Dear Krystin Pearson,    [facility] records indicate that you have outstanding lab work and or testing that was ordered for you and has not yet been completed: Fasting lab work   *fast fo

## (undated) NOTE — LETTER
Dr. Berry    Surgical Clearance Needed    Date: 5/21/2024                                                                       From: Eleni Traylor: Dr. Germain                                                                             Fax: [phone]    RE: Surgical Clearance               # of Pages: 1        Patient Name: Cecil Aguayo    Patient YOB: 1940    Consult For:  Cardiac Clearance and Anti coagulation management    Surgery: ROBOTIC LAPAROSCOPIC RIGHT INGUINAL HERNIA REPAIR WITH MESH, POSSIBLE LEFT    Date of Surgery: TBD AT [facility]        This facsimile transmission is provided for your internal use only. If the reader of this message is not the intended recipient, you are hereby notified that you have received this document in error, and that any review, dissemination, distribution, or copying of this message is strictly prohibited. If you have received this communication in error, please notify us immediately by telephone and return the original message to us by mail.

## (undated) NOTE — LETTER
08/08/20        Ivelisse Shital  [address] [place] [address]      Dear [facility],    [facility] records indicate that you have outstanding lab work and or testing that was ordered for you and has not yet been completed: Fasting lab work - (at [facility]

## (undated) NOTE — LETTER
10/22/24    Patient: Cecil Aguayo  : 1940 Visit date: 10/22/2024    Dear  Neal Cleveland MD    Thank you for referring Cecil Aguayo to my practice.  Please find my assessment and plan below.     Assessment   1. Non-recurrent bilateral inguinal hernia without obstruction or gangrene          Plan     The patient will be scheduled for robotic bilateral inguinal hernia repair with mesh.    The patient will need to withhold Eliquis prior to surgery.  We will request anticoagulation management recommendations from the patient's cardiologist.    The jennifer-operative care plan was discussed with the patient, who voices understanding.  Activity and lifting recommendations were discussed in length.     The risks, benefits, and alternatives to the procedure were explained to the patient.  The risks explained include, but are not limited to, bleeding, infection, pain wound complications, recurrence, incorrect diagnosis, injury to adjacent organs and structures. We also discussed the possibile need for further therapeutic, diagnostic, or surgical intervention.  The patient voiced understanding, and after all questions were answered to the patient's satisfaction, the patient provided willing and informed consent to proceed.      Sincerely,       You Rosario MD   CC: No Recipients

## (undated) NOTE — LETTER
12/16/21        Jacquie Patterson  [address]  [address] [facility] [address]      Dear Zenia Casey,    [facility] records indicate that you have outstanding lab work and or testing that was ordered for you and has not yet been completed:  Orders Placed This Encount

## (undated) NOTE — LETTER
24    Patient: Cecil Aguayo  : 1940 Visit date: 2024    Dear  Neal Cleveland MD    Thank you for referring Cecil Aguayo to my practice.  Please find my assessment and plan below.        Good morning.  Thank you for the referral.  I saw Cecil today for right inguinal hernia.  On physical exam, he has a large right inguinal hernia as well as a small left inguinal hernia.  The right inguinal hernia does cause some discomfort but the left is asymptomatic.  I discussed surgical intervention, including robotic repair.  I think Cecil is a candidate for robotic repair of his right inguinal hernia, as well as his left if he chooses.  He will need both medical and cardiac clearance prior to surgical intervention. Cecil will call my office if he wishes to proceed with surgery. I did review symptoms of incarceration with Cecil. If he has any, he is to go to the emergency room as he made need surgery sooner. Thank you once again for the referral and please let me know if you need anything else.    Sincerely,       Etta Berry MD